# Patient Record
Sex: FEMALE | Race: WHITE | Employment: PART TIME | URBAN - METROPOLITAN AREA
[De-identification: names, ages, dates, MRNs, and addresses within clinical notes are randomized per-mention and may not be internally consistent; named-entity substitution may affect disease eponyms.]

---

## 2017-06-15 ENCOUNTER — OFFICE VISIT (OUTPATIENT)
Dept: FAMILY MEDICINE | Facility: CLINIC | Age: 52
End: 2017-06-15
Payer: MEDICARE

## 2017-06-15 ENCOUNTER — RESULT FOLLOW UP (OUTPATIENT)
Dept: FAMILY MEDICINE | Facility: CLINIC | Age: 52
End: 2017-06-15

## 2017-06-15 VITALS
BODY MASS INDEX: 24.19 KG/M2 | DIASTOLIC BLOOD PRESSURE: 72 MMHG | WEIGHT: 120 LBS | SYSTOLIC BLOOD PRESSURE: 112 MMHG | HEIGHT: 59 IN | HEART RATE: 72 BPM | TEMPERATURE: 98.1 F

## 2017-06-15 DIAGNOSIS — E04.2 NONTOXIC MULTINODULAR GOITER: ICD-10-CM

## 2017-06-15 DIAGNOSIS — Z12.4 SCREENING FOR MALIGNANT NEOPLASM OF CERVIX: ICD-10-CM

## 2017-06-15 DIAGNOSIS — Q05.9 SPINA BIFIDA WITHOUT HYDROCEPHALUS, UNSPECIFIED SPINAL REGION (H): ICD-10-CM

## 2017-06-15 DIAGNOSIS — N81.11 CYSTOCELE, MIDLINE: ICD-10-CM

## 2017-06-15 DIAGNOSIS — Z43.6 ATTENTION TO UROSTOMY (H): ICD-10-CM

## 2017-06-15 DIAGNOSIS — Z11.59 NEED FOR HEPATITIS C SCREENING TEST: ICD-10-CM

## 2017-06-15 DIAGNOSIS — G40.909 SEIZURE DISORDER (H): ICD-10-CM

## 2017-06-15 DIAGNOSIS — M70.61 TROCHANTERIC BURSITIS OF RIGHT HIP: ICD-10-CM

## 2017-06-15 DIAGNOSIS — Z00.01 ENCOUNTER FOR ROUTINE ADULT HEALTH EXAMINATION WITH ABNORMAL FINDINGS: Primary | ICD-10-CM

## 2017-06-15 DIAGNOSIS — Z89.619 HISTORY OF LEG AMPUTATION (H): ICD-10-CM

## 2017-06-15 LAB
ANION GAP SERPL CALCULATED.3IONS-SCNC: 7 MMOL/L (ref 3–14)
BUN SERPL-MCNC: 15 MG/DL (ref 7–30)
CALCIUM SERPL-MCNC: 8.6 MG/DL (ref 8.5–10.1)
CHLORIDE SERPL-SCNC: 107 MMOL/L (ref 94–109)
CO2 SERPL-SCNC: 26 MMOL/L (ref 20–32)
CREAT SERPL-MCNC: 0.66 MG/DL (ref 0.52–1.04)
GFR SERPL CREATININE-BSD FRML MDRD: NORMAL ML/MIN/1.7M2
GLUCOSE SERPL-MCNC: 84 MG/DL (ref 70–99)
POTASSIUM SERPL-SCNC: 4.1 MMOL/L (ref 3.4–5.3)
SODIUM SERPL-SCNC: 140 MMOL/L (ref 133–144)
TSH SERPL DL<=0.005 MIU/L-ACNC: 1.19 MU/L (ref 0.4–4)

## 2017-06-15 PROCEDURE — 84443 ASSAY THYROID STIM HORMONE: CPT | Performed by: FAMILY MEDICINE

## 2017-06-15 PROCEDURE — G0472 HEP C SCREEN HIGH RISK/OTHER: HCPCS | Performed by: FAMILY MEDICINE

## 2017-06-15 PROCEDURE — 80048 BASIC METABOLIC PNL TOTAL CA: CPT | Performed by: FAMILY MEDICINE

## 2017-06-15 PROCEDURE — 99213 OFFICE O/P EST LOW 20 MIN: CPT | Mod: 25 | Performed by: FAMILY MEDICINE

## 2017-06-15 PROCEDURE — G0476 HPV COMBO ASSAY CA SCREEN: HCPCS | Performed by: FAMILY MEDICINE

## 2017-06-15 PROCEDURE — G0145 SCR C/V CYTO,THINLAYER,RESCR: HCPCS | Performed by: FAMILY MEDICINE

## 2017-06-15 PROCEDURE — 36415 COLL VENOUS BLD VENIPUNCTURE: CPT | Performed by: FAMILY MEDICINE

## 2017-06-15 PROCEDURE — 86803 HEPATITIS C AB TEST: CPT | Performed by: FAMILY MEDICINE

## 2017-06-15 PROCEDURE — 99396 PREV VISIT EST AGE 40-64: CPT | Performed by: FAMILY MEDICINE

## 2017-06-15 NOTE — NURSING NOTE
"Chief Complaint   Patient presents with     Medicare Visit     Physical       Initial /72 (BP Location: Right arm, Cuff Size: Adult Regular)  Pulse 72  Temp 98.1  F (36.7  C) (Oral)  Ht 4' 11\" (1.499 m)  Wt 120 lb (54.4 kg)  LMP 06/05/2017  BMI 24.24 kg/m2 Estimated body mass index is 24.24 kg/(m^2) as calculated from the following:    Height as of this encounter: 4' 11\" (1.499 m).    Weight as of this encounter: 120 lb (54.4 kg).  Medication Reconciliation: complete     Dana Bedolla CMA (AAMA)      "

## 2017-06-15 NOTE — PROGRESS NOTES
SUBJECTIVE:     CC: David Grissom is an 51 year old woman who presents for preventive health visit.     Healthy Habits:    Do you get at least three servings of calcium containing foods daily (dairy, green leafy vegetables, etc.)? yes    Amount of exercise or daily activities, outside of work: 4-5 day(s) per week    Problems taking medications regularly No    Medication side effects: No    Have you had an eye exam in the past two years? yes    Do you see a dentist twice per year? yes    Do you have sleep apnea, excessive snoring or daytime drowsiness?yes    Spina Bifida + Seizure Disorder  Patient has a hx of spina bifida and a seizure disorder. She is not on any meds for seizures at this time. She has not seen a neurologist in quite some time either. She hasn't had a seizure in 6-7 years. She reports that the seizures were a allergic reaction from taking cipro for UTI. She does not drive.    Urostomy Tube  She has a urostomy tube because of her spina bifida. She last saw urology in 2014 and at that time they ordered a CAT scan of her kidneys and they discussed continent neobladder. It does not look like this was followed up on and she has not seen urology since.    Prolapse  Patient reports ongoing prolapse and that it has been feeling a bit more uncomfortable and that it is coming out more. It's not painful, but it is bothersome. She would be more interested in pessary than a surgery but is willing to talk to OB about these options.     Hip Pain  She has a job at [employer] and is standing a lot. She has been experiencing sore hips as a result. She has started taking magnesium at night as she experiences pain at night. She was also taking ibuprofen before for the pain and that helps. She does have some swelling in her legs when she stands for too long. X-ray from April 2015 of right hip showed chronic deformity of the femoral head, degenerative changes, and severe old healed trauma due to surgeries.      Thyroid  nodule   In 2013, US showed 3 cm complex cystic nodule and she was told to have it checked with an aspiration. Biopsy was done - results benign and she was told to repeat US in a year or go back to endocrinology if it enlarges.      Additional Comments  She has a hx of leg amputation and wears a prosthetic    Today's PHQ-2 Score:   PHQ-2 ( 1999 [employer]) 6/15/2017 4/15/2016   Q1: Little interest or pleasure in doing things 0 -   Q2: Feeling down, depressed or hopeless 0 -   PHQ-2 Score 0 -   Q1: Little interest or pleasure in doing things - Not at all   Q2: Feeling down, depressed or hopeless - Not at all   PHQ-2 Score - 0     Abuse: Current or Past(Physical, Sexual or Emotional)- No  Do you feel safe in your environment - Yes    Social History   Substance Use Topics     Smoking status: Former Smoker     Types: Cigarettes     Quit date: 1/2/1987     Smokeless tobacco: Never Used     Alcohol use 0.0 - 0.6 oz/week     0 - 1 Standard drinks or equivalent per week      Comment: occasional     The patient does not drink >3 drinks per day nor >7 drinks per week.    Recent Labs   Lab Test  04/18/16   0917  04/16/15   0901  04/01/13   0928   CHOL  177  159  172   HDL  74  69  62   LDL  88  78  98   TRIG  76  58  58   CHOLHDLRATIO   --   2.3  2.8   NHDL  103   --    --      Reviewed orders with patient.  Reviewed health maintenance and updated orders accordingly - Yes    Mammo Decision Support:  Patient over age 50, mutual decision to screen reflected in health maintenance.    Pertinent mammograms are reviewed under the imaging tab.  History of abnormal Pap smear: NO - age 30-65 PAP every 5 years with negative HPV co-testing recommended    Reviewed and updated as needed this visit by clinical staff  Tobacco  Allergies  Meds  Med Hx  Surg Hx  Fam Hx  Soc Hx        Reviewed and updated as needed this visit by Provider          ROS:  C: NEGATIVE for fever, chills, change in weight  I: NEGATIVE for worrisome rashes, moles or  "lesions  E: NEGATIVE for vision changes or irritation  ENT: NEGATIVE for ear, mouth and throat problems  R: NEGATIVE for significant cough or SOB  B: NEGATIVE for masses, tenderness or discharge  CV: NEGATIVE for chest pain, palpitations or peripheral edema  GI: NEGATIVE for nausea, abdominal pain, heartburn, or change in bowel habits  : NEGATIVE for unusual urinary or vaginal symptoms. Periods are regular.  M: See above  N: NEGATIVE for weakness, dizziness or paresthesias  P: NEGATIVE for changes in mood or affect    This document serves as a record of the services and decisions personally performed and made by Naima Saul DO. It was created on her/his behalf by Sarah Hinson, a trained medical scribe. The creation of this document is based on the provider's statements to the medical scribe.  Sarah Hinson Aleah 15, 2017 10:07 AM    Problem list, Medication list, Allergies, and Medical/Social/Surgical histories reviewed in EPIC and updated as appropriate.  BP Readings from Last 3 Encounters:   06/15/17 112/72   11/29/16 110/68   04/18/16 116/64    Wt Readings from Last 3 Encounters:   06/15/17 54.4 kg (120 lb)   11/29/16 53.6 kg (118 lb 4 oz)   04/18/16 56.7 kg (125 lb)         OBJECTIVE:     /72 (BP Location: Right arm, Cuff Size: Adult Regular)  Pulse 72  Temp 98.1  F (36.7  C) (Oral)  Ht 1.499 m (4' 11\")  Wt 54.4 kg (120 lb)  LMP 06/05/2017  BMI 24.24 kg/m2  EXAM:  GENERAL: healthy, alert and no distress  EYES: Eyes grossly normal to inspection, PERRL and conjunctivae and sclerae normal  HENT: ear canals and TM's normal, nose and mouth without ulcers or lesions  NECK: no adenopathy, no asymmetry, masses, or scars and thyroid normal to palpation  RESP: lungs clear to auscultation - no rales, rhonchi or wheezes  BREAST: normal bilateral fibrocystic changes, normal without masses, tenderness or nipple discharge and no palpable axillary masses or adenopathy  CV: regular rate and rhythm, normal S1 S2, " no S3 or S4, no murmur, click or rub, no peripheral edema and peripheral pulses strong  ABDOMEN: soft, nontender, no hepatosplenomegaly, no masses and bowel sounds normal   (female): cervix over to the left, normal female external genitalia, normal urethral meatus, vaginal mucosa pink, moist, well rugated, and normal cervix/adnexa/uterus without masses or discharge  MS: painless and FROM of left hip, reduced internal rotation of the right hip, mild tenderness to the left trochanteric bursa, tenderness over the right bursa, no gross musculoskeletal defects noted, no edema  SKIN: large scar on lumbar spine, no suspicious lesions or rashes  PSYCH: mentation appears normal, affect normal/bright    ASSESSMENT/PLAN:     (Z00.01) Encounter for routine adult health examination with abnormal findings  (primary encounter diagnosis)  Comment:   Plan:     (Q05.9) Spina bifida without hydrocephalus, unspecified spinal region (H)  Comment:   Plan:     (G40.909) Seizure disorder (H)  Comment:   Plan: not active in 7 years     (E04.2) Nontoxic multinodular goiter  Comment:   Plan: US Thyroid, TSH with free T4 reflex            (Z89.619) History of leg amputation (H)  Comment:   Plan: [facility] PT, HAND, AND CHIROPRACTIC REFERRAL            (Z43.6) Attention to urostomy (H)  Comment:   Plan: UROLOGY ADULT REFERRAL, Basic metabolic panel          (Ca, Cl, CO2, Creat, Gluc, K, Na, BUN)            (Z12.4) Screening for malignant neoplasm of cervix  Comment:   Plan: Pap imaged thin layer screen with HPV -         recommended age 30 - 65 years (select HPV order        below), HPV High Risk Types DNA Cervical            (Z11.59) Need for hepatitis C screening test  Comment:   Plan: Hepatitis C Screen Reflex to HCV RNA Quant and         Genotype            (N81.11) Cystocele, midline  Comment:   Plan: OB/GYN REFERRAL            (M70.61) Trochanteric bursitis of right hip  Comment:   Plan: [facility] HAND, AND CHIROPRACTIC REFERRAL      I referred  "patient to urology to have urostomy tube evaluated and referred her to OBGYN for a pessary consult. I also referred her to physical therapy to treat hip pain caused by trochanertic bursisits.  She can return for an injection if the PT doesn't help it to resolve.   . The current medical regimen is effective;  continue present plan and medications. Patient will follow up as needed.     COUNSELING:   Reviewed preventive health counseling, as reflected in patient instructions       Regular exercise       Healthy diet/nutrition     reports that she quit smoking about 30 years ago. Her smoking use included Cigarettes. She has never used smokeless tobacco.    Estimated body mass index is 24.24 kg/(m^2) as calculated from the following:    Height as of this encounter: 1.499 m (4' 11\").    Weight as of this encounter: 54.4 kg (120 lb).     Counseling Resources:  ATP IV Guidelines  Pooled Cohorts Equation Calculator  Breast Cancer Risk Calculator  FRAX Risk Assessment  ICSI Preventive Guidelines  Dietary Guidelines for Americans, 2010  USDA's MyPlate  ASA Prophylaxis  Lung CA Screening    Naima Saul DO  [facility]    This document serves as a record of the services and decisions personally performed and made by Naima Saul DO. It was created on her behalf by Sarah Hinson, a trained medical scribe. The creation of this document is based on the provider's statements to the medical scribe.  Sarah Hinson Aleah 15, 2017 10:07 AM      "

## 2017-06-15 NOTE — MR AVS SNAPSHOT
After Visit Summary   6/15/2017    David Grissom    MRN: [id number]           Patient Information     Date Of Birth          1965        Visit Information        Provider Department      6/15/2017 9:40 AM Naima Saul DO [facility]        Today's Diagnoses     Encounter for routine adult health examination with abnormal findings    -  1    Spina bifida without hydrocephalus, unspecified spinal region (H)        Seizure disorder (H)        Nontoxic multinodular goiter        History of leg amputation (H)        Attention to urostomy (H)        Screening for malignant neoplasm of cervix        Need for hepatitis C screening test        Cystocele, midline        Trochanteric bursitis of right hip          Care Instructions      Preventive Health Recommendations  Female Ages 50 - 64    Yearly exam: See your health care provider every year in order to  o Review health changes.   o Discuss preventive care.    o Review your medicines if your doctor has prescribed any.      Get a Pap test every three years (unless you have an abnormal result and your provider advises testing more often).    If you get Pap tests with HPV test, you only need to test every 5 years, unless you have an abnormal result.     You do not need a Pap test if your uterus was removed (hysterectomy) and you have not had cancer.    You should be tested each year for STDs (sexually transmitted diseases) if you're at risk.     Have a mammogram every 1 to 2 years.    Have a colonoscopy at age 50, or have a yearly FIT test (stool test). These exams screen for colon cancer.      Have a cholesterol test every 5 years, or more often if advised.    Have a diabetes test (fasting glucose) every three years. If you are at risk for diabetes, you should have this test more often.     If you are at risk for osteoporosis (brittle bone disease), think about having a bone density scan (DEXA).    Shots: Get a flu shot each year.  Get a tetanus shot every 10 years.    Nutrition:     Eat at least 5 servings of fruits and vegetables each day.    Eat whole-grain bread, whole-wheat pasta and brown rice instead of white grains and rice.    Talk to your provider about Calcium and Vitamin D.     Lifestyle    Exercise at least 150 minutes a week (30 minutes a day, 5 days a week). This will help you control your weight and prevent disease.    Limit alcohol to one drink per day.    No smoking.     Wear sunscreen to prevent skin cancer.     See your dentist every six months for an exam and cleaning.    See your eye doctor every 1 to 2 years.    Go see OBGYN - Dr. Kimbrough (let them know that this is a pessary consult).    Do physical therapy for bursitis.    Go see Urology.    Get Thyroid Ultra Sound.          Follow-ups after your visit        Additional Services     [facility], HAND, AND CHIROPRACTIC REFERRAL       **This order will print in the [facility] Scheduling Office**    Physical Therapy, Hand Therapy and Chiropractic Care are available through:    *[facility]  *[facility]  *[facility]    Call one number to schedule at any of the above locations: [phone].    Your provider has referred you to: Physical Therapy at [facility] or [facility]    Indication/Reason for Referral: Hip Pain  Onset of Illness: months   Therapy Orders: Evaluate and Treat  Special Programs: None  Special Request: None    Salena Martion      Additional Comments for the Therapist or Chiropractor:  Baljeet     Please be aware that coverage of these services is subject to the terms and limitations of your health insurance plan.  Call member services at your health plan with any benefit or coverage questions.      Please bring the following to your appointment:    *Your personal calendar for scheduling future appointments  *Comfortable clothing            OB/GYN REFERRAL       Your provider has referred you to:  [facility]  [facility] [phone]   [email or web address]    Please be aware that coverage of these services is subject to the terms and limitations of your health insurance plan.  Call member services at your health plan with any benefit or coverage questions.      Please bring the following with you to your appointment:    (1) Any X-Rays, CTs or MRIs which have been performed.  Contact the facility where they were done to arrange for  prior to your scheduled appointment.   (2) List of current medications   (3) This referral request   (4) Any documents/labs given to you for this referral            UROLOGY ADULT REFERRAL       Your provider has referred you to: [facility]: [facility] [phone]   [email or web address]    Please be aware that coverage of these services is subject to the terms and limitations of your health insurance plan.  Call member services at your health plan with any benefit or coverage questions.      Please bring the following with you to your appointment:    (1) Any X-Rays, CTs or MRIs which have been performed.  Contact the facility where they were done to arrange for  prior to your scheduled appointment.    (2) List of current medications  (3) This referral request   (4) Any documents/labs given to you for this referral                  Future tests that were ordered for you today     Open Future Orders        Priority Expected Expires Ordered    US Thyroid Routine  6/15/2018 6/15/2017            Who to contact     If you have questions or need follow up information about today's clinic visit or your schedule please contact [facility] directly at [phone].  Normal or non-critical lab and imaging results will be communicated to you by MyChart, letter or phone within 4 business days after the clinic has received the results. If you do not hear from us within 7 days, please contact the clinic  "through [employer] or phone. If you have a critical or abnormal lab result, we will notify you by phone as soon as possible.  Submit refill requests through [employer] or call your pharmacy and they will forward the refill request to us. Please allow 3 business days for your refill to be completed.          Additional Information About Your Visit        doggylootharTrendr Information     [employer] gives you secure access to your electronic health record. If you see a primary care provider, you can also send messages to your care team and make appointments. If you have questions, please call your primary care clinic.  If you do not have a primary care provider, please call [phone] and they will assist you.        Care EveryWhere ID     This is your Care EveryWhere ID. This could be used by other organizations to access your [facility] medical records  [id number]        Your Vitals Were     Pulse Temperature Height Last Period BMI (Body Mass Index)       72 98.1  F (36.7  C) (Oral) 4' 11\" (1.499 m) 06/05/2017 24.24 kg/m2        Blood Pressure from Last 3 Encounters:   06/15/17 112/72   11/29/16 110/68   04/18/16 116/64    Weight from Last 3 Encounters:   06/15/17 120 lb (54.4 kg)   11/29/16 118 lb 4 oz (53.6 kg)   04/18/16 125 lb (56.7 kg)              We Performed the Following     Basic metabolic panel  (Ca, Cl, CO2, Creat, Gluc, K, Na, BUN)     Hepatitis C Screen Reflex to HCV RNA Quant and Genotype     HPV High Risk Types DNA Cervical     [facility] PT, HAND, AND CHIROPRACTIC REFERRAL     OB/GYN REFERRAL     Pap imaged thin layer screen with HPV - recommended age 30 - 65 years (select HPV order below)     TSH with free T4 reflex     UROLOGY ADULT REFERRAL        Primary Care Provider Office Phone # Fax #    Naima Saul [phone]       [address] [address]        Thank you!     Thank you for choosing [facility]  for your care. Our goal is always " to provide you with excellent care. Hearing back from our patients is one way we can continue to improve our services. Please take a few minutes to complete the written survey that you may receive in the mail after your visit with us. Thank you!             Your Updated Medication List - Protect others around you: Learn how to safely use, store and throw away your medicines at www.disposemymeds.org.          This list is accurate as of: 6/15/17 10:44 AM.  Always use your most recent med list.                   Brand Name Dispense Instructions for use    ascorbic acid 250 MG tablet    VITAMIN C     Take 250 mg by mouth daily.       DAILY MULTIVITAMIN PO      Take  by mouth.       Fish Oil Oil      daily.       * order for DME     1 Device    Equipment being ordered: right prosthetic leg       * order for DME     1 Device    Equipment being ordered: crutches       * Notice:  This list has 2 medication(s) that are the same as other medications prescribed for you. Read the directions carefully, and ask your doctor or other care provider to review them with you.

## 2017-06-15 NOTE — PATIENT INSTRUCTIONS

## 2017-06-15 NOTE — LETTER
June 26, 2018      David Grissom  [address]   [place] [address]    Dear MsSaraiJaciel,      At [facility], your health and wellness is our primary concern. That is why we are following up on a colposcopy from 7/10/17. Your provider had recommended that you have a Pap smear and HPV test completed by 7/10/18. Our records do not show that this has been scheduled.    It is important to complete the follow up that your provider has suggested for you to ensure that there are no worsening changes which may, over time, develop into cancer.      Please contact our office at  [phone] to schedule an appointment for a Pap smear and HPV test at your earliest convenience. If you have questions or concerns, please call the clinic and we will be happy to assist you.    If you have completed the tests outside of [facility], please have the results forwarded to our office. We will update the chart for your primary Physician to review before your next annual physical.     Thank you for choosing [facility]!    Sincerely,      Naima Saul DO/jojo

## 2017-06-16 LAB — HCV AB SERPL QL IA: NORMAL

## 2017-06-20 LAB
COPATH REPORT: ABNORMAL
PAP: ABNORMAL

## 2017-06-21 PROBLEM — R87.610 ASCUS WITH POSITIVE HIGH RISK HPV CERVICAL: Status: ACTIVE | Noted: 2017-06-17

## 2017-06-21 PROBLEM — R87.810 ASCUS WITH POSITIVE HIGH RISK HPV CERVICAL: Status: ACTIVE | Noted: 2017-06-17

## 2017-06-21 LAB
FINAL DIAGNOSIS: ABNORMAL
HPV HR 12 DNA CVX QL NAA+PROBE: POSITIVE
HPV16 DNA SPEC QL NAA+PROBE: POSITIVE
HPV18 DNA SPEC QL NAA+PROBE: NEGATIVE
SPECIMEN DESCRIPTION: ABNORMAL

## 2017-06-21 NOTE — PROGRESS NOTES
3/27/12: ASCUS Pap, Neg HPV  4/1/13: NIL Pap  6/10/14: NIL Pap, Neg HPV  6/15/17: [facility]US Pap, + HR HPV 16 & other HR HPV. Plan colp  6/22/17 Message left to return call.   6/22/17 Pt notified. [facility].  7/10/17 [place] [facility] neg. Plan: cotest in 1 year.  6/26/18 [facility] reminder letter sent (rlm)  9/27/18 NIL pap, Neg HR HPV. Plan cotest in 3 years.  (MRA)

## 2017-06-26 ENCOUNTER — TELEPHONE (OUTPATIENT)
Dept: FAMILY MEDICINE | Facility: CLINIC | Age: 52
End: 2017-06-26

## 2017-06-26 NOTE — TELEPHONE ENCOUNTER
Reason for visit: colposcopy     Have you been treated for this in the past? No    Additional comments: patient needs to schedule a colposcopy     Can we leave a detailed message on this number? YES    Phone number patient can be reached at: Home number on file [phone] (home)    Best Time: anytime     Call taken on 6/26/2017 at 8:12 AM by Marbella Duran

## 2017-07-03 ENCOUNTER — THERAPY VISIT (OUTPATIENT)
Dept: PHYSICAL THERAPY | Facility: CLINIC | Age: 52
End: 2017-07-03
Payer: MEDICARE

## 2017-07-03 DIAGNOSIS — M54.50 CHRONIC BILATERAL LOW BACK PAIN WITHOUT SCIATICA: Primary | ICD-10-CM

## 2017-07-03 DIAGNOSIS — G89.29 CHRONIC BILATERAL LOW BACK PAIN WITHOUT SCIATICA: Primary | ICD-10-CM

## 2017-07-03 PROCEDURE — G8981 BODY POS CURRENT STATUS: HCPCS | Mod: GP | Performed by: PHYSICAL THERAPIST

## 2017-07-03 PROCEDURE — 97110 THERAPEUTIC EXERCISES: CPT | Mod: GP | Performed by: PHYSICAL THERAPIST

## 2017-07-03 PROCEDURE — 97161 PT EVAL LOW COMPLEX 20 MIN: CPT | Mod: GP | Performed by: PHYSICAL THERAPIST

## 2017-07-03 PROCEDURE — G8982 BODY POS GOAL STATUS: HCPCS | Mod: GP | Performed by: PHYSICAL THERAPIST

## 2017-07-03 PROCEDURE — 97530 THERAPEUTIC ACTIVITIES: CPT | Mod: GP | Performed by: PHYSICAL THERAPIST

## 2017-07-03 ASSESSMENT — ACTIVITIES OF DAILY LIVING (ADL)
WALKING_DOWN_STEEP_HILLS: MODERATE DIFFICULTY
DEEP_SQUATTING: EXTREME DIFFICULTY
HOS_ADL_COUNT: 17
HEAVY_WORK: EXTREME DIFFICULTY
GETTING_INTO_AND_OUT_OF_AN_AVERAGE_CAR: NO DIFFICULTY AT ALL
WALKING_APPROXIMATELY_10_MINUTES: SLIGHT DIFFICULTY
SITTING_FOR_15_MINUTES: NO DIFFICULTY AT ALL
STEPPING_UP_AND_DOWN_CURBS: NO DIFFICULTY AT ALL
WALKING_INITIALLY: NO DIFFICULTY AT ALL
STANDING_FOR_15_MINUTES: NO DIFFICULTY AT ALL
RECREATIONAL_ACTIVITIES: SLIGHT DIFFICULTY
GOING_DOWN_1_FLIGHT_OF_STAIRS: SLIGHT DIFFICULTY
PUTTING_ON_SOCKS_AND_SHOES: NO DIFFICULTY AT ALL
LIGHT_TO_MODERATE_WORK: SLIGHT DIFFICULTY
HOS_ADL_ITEM_SCORE_TOTAL: 46
TWISTING/PIVOTING_ON_INVOLVED_LEG: MODERATE DIFFICULTY
HOW_WOULD_YOU_RATE_YOUR_CURRENT_LEVEL_OF_FUNCTION_DURING_YOUR_USUAL_ACTIVITIES_OF_DAILY_LIVING_FROM_0_TO_100_WITH_100_BEING_YOUR_LEVEL_OF_FUNCTION_PRIOR_TO_YOUR_HIP_PROBLEM_AND_0_BEING_THE_INABILITY_TO_PERFORM_ANY_OF_YOUR_USUAL_DAILY_ACTIVITIES?: 70
HOS_ADL_SCORE(%): 67.65
WALKING_15_MINUTES_OR_GREATER: MODERATE DIFFICULTY
ROLLING_OVER_IN_BED: MODERATE DIFFICULTY
HOS_ADL_HIGHEST_POTENTIAL_SCORE: 68
GETTING_INTO_AND_OUT_OF_A_BATHTUB: SLIGHT DIFFICULTY
GOING_UP_1_FLIGHT_OF_STAIRS: SLIGHT DIFFICULTY
WALKING_UP_STEEP_HILLS: MODERATE DIFFICULTY

## 2017-07-03 NOTE — LETTER
DEPARTMENT OF HEALTH AND HUMAN SERVICES  CENTERS FOR MEDICARE & MEDICAID SERVICES    PLAN/UPDATED PLAN OF PROGRESS FOR OUTPATIENT REHABILITATION    PATIENTS NAME:  David Grissom   : 1965    PROVIDER NUMBER:    [id number]  [facility]:  [phone]     PROVIDER NAME: [facility]  MEDICAL RECORD NUMBER: [id number]     START OF CARE DATE:  SOC Date: 17   TYPE:  PT    PRIMARY/TREATMENT DIAGNOSIS: (Pertinent Medical Diagnosis)  Chronic bilateral low back pain without sciatica    VISITS FROM START OF CARE:    1     [facility] Initial Evaluation -- Lumbar  Date: July 3, 2017  David Grissom is a 51 year old female with a R hip condition.   Referral: GP  Work mechanical stresses:  Nay   Employment status:  Up to FT  Leisure mechanical stresses:   Functional disability score (LESLY/STarT Back):  none  VAS score (0-10): 4  Patient goals/expectations:  Cont to work and manage pain sleep on R side    HISTORY:  Present symptoms: R lat hip, B LS pain  Pain quality (sharp/shooting/stabbing/aching/burning/cramping):  stinging   Paresthesia (yes/no):  no  Present since (onset date): 2017  MD order 6/15/2017   Symptoms (improving/unchanging/worsening):  unchanged  Symptoms commenced as a result of: standing at new job  Condition occurred in the following environment:   work   Symptoms at onset (back/thigh/leg): LS  Constant symptoms (back/thigh/leg): LS and hip  Intermittent symptoms (back/thigh/leg):   Symptoms are made worse with the following: Sometimes Sitting, Always Standing, Always Lying, Time of day -PM always  Symptoms are made better with the following: Always Walking, Time of day - Always AM and Always On the move  Disturbed sleep (yes/no):  Yes, positional   Sleeping postures (prone/sup/side R/L): SL R  Previous episodes (0/1-5/6-10/11+): chronic LBP due to spina bifida   Year of first episode:   Previous history:   Previous  treatments: none    PATIENTS NAME:  David Grissom   : 1965  PRIMARY/TREATMENT DIAGNOSIS: (Pertinent Medical Diagnosis)  Chronic bilateral low back pain without sciatica    Specific Questions:  Cough/Sneeze/Strain (pos/neg): no  Bowel/Bladder (normal/abnormal): normal  Gait (normal/abnormal): normal  Medications (nil/NSAIDS/analg/steroids/anticoag/other):  NSAIDS and OTC analgesic  Medical allergies:   latex  General health (excellent/good/fair/poor):  good  Pertinent medical history:  Seizures and spina bifida,  R BKA  Imaging (NA/Xray/MRI):   hip DJD  Recent or major surgery (yes/no):  no  Night pain (yes/no): yes  Accidents (yes/no): no  Unexplained weight loss (yes/no): no  Barriers at home: none  Other red flags: none    EXAMINATION    Posture:   Sitting (good/fair/poor): poor  Standing (good/fair/poor):poor, uses forearm crutches  Lordosis (red/acc/normal): dec  Correction of posture (better/worse/no effect): better  Lateral Shift (right/left/nil): no  Relevant (yes/no):    Other Observations:     Neurological:  Motor deficit:    Reflexes:    Sensory deficit:    Dural signs:    Movement Loss:   Estevan Mod Min Nil Pain   Flexion     Hands flat to floor, dec   Extension + upper body veers right    dec   Side Gliding R     unable   Side Gliding L    +    Test Movements:   During: produces, abolishes, increases, decreases, no effect, centralizing, peripheralizing   After: better, worse, no better, no worse, no effect, centralized, peripheralized          PATIENTS NAME:  David Grissom   : 1965  PRIMARY/TREATMENT DIAGNOSIS: (Pertinent Medical Diagnosis)  Chronic bilateral low back pain without sciatica    Pretest symptoms standing: R LS lat hip   Symptoms During Symptoms After ROM increased ROM decreased No Effect   FIS Decreases    No Better         Rep FIS Decreases    No Better      + hands to floor to begin with   EIS Unable to perform easily so did EIL           Rep EIS           Pretest symptoms lying: R lat 4/10hip stinging   Symptoms During Symptoms After ROM increased ROM decreased No Effect   GARRISON SIM 1 min then 2 min  dec  NB       Rep GARRISON  na        EIL Decreases R hip   P. R LS on way down No Better hip pain         Rep EIL Decreases R hip and dec R LS with reps    Better hip pain in standing, R LS same     +     If required, pretest symptoms:    Symptoms During Symptoms After ROM increased ROM decreased No Effect   SGIS - R          Rep SGIS - R          SGIS - L          Rep SGIS - L            Static Tests:  Sitting slouched:    Sitting erect:    Standing slouched   Standing erect:    Lying prone in extension:   Long sitting:    Other Test: standing with 75% wt bearing R LE   Provisional Classification:  Derangement - Bilateral, symmetrical, symptoms above knee  Principle of Management:  Education:  Etiology hip vs LS centralization proper sitting     Equipment provided:  Towel roll  Mechanical therapy (Y/N):  Y   Extension principle:  EIL 2 x 10 reps q 2-3 hrs at home EIS 10 x at work q 1 hr  Lateral Principle:    Flexion principle:      Other:  Attempt to equalize wt and stand with feet apart at work      PATIENTS NAME:  David Grissom   : 1965  PRIMARY/TREATMENT DIAGNOSIS: (Pertinent Medical Diagnosis)  Chronic bilateral low back pain without sciatica    ASSESSMENT/PLAN:  Patient is a 51 year old female with lumbar complaints.    Patient has the following significant findings with corresponding treatment plan.                Diagnosis 1:  LBP referred to hip  Pain -  self management, education, directional preference exercise and home program  Decreased ROM/flexibility - therapeutic exercise, therapeutic activity and home program  Decreased joint mobility - therapeutic exercise, therapeutic activity and home program  Decreased function - therapeutic activities and home program  Impaired posture - neuro re-education, therapeutic activities and home  program    Therapy Evaluation Codes:   1) History comprised of:   Personal factors that impact the plan of care:      Profession.    Comorbidity factors that impact the plan of care are:      Pain at night/rest, Seizures and spina bifida, R BKA.     Medications impacting care: Anti-inflammatory and Pain.  2) Examination of Body Systems comprised of:   Body structures and functions that impact the plan of care:      Lumbar spine.   Activity limitations that impact the plan of care are:      Standing and Working. Sleeping.  3) Clinical presentation characteristics are:   Stable/Uncomplicated.  4) Decision-Making    Low complexity using standardized patient assessment instrument and/or measureable assessment of functional outcome.  Cumulative Therapy Evaluation is: Low complexity.    Previous and current functional limitations:  (See Goal Flow Sheet for this information)    Short term and Long term goals: (See Goal Flow Sheet for this information)   Communication ability:  Patient appears to be able to clearly communicate and understand verbal and written communication and follow directions correctly.  Treatment Explanation - The following has been discussed with the patient:   RX ordered/plan of care  Anticipated outcomes  Possible risks and side effects                        PATIENTS NAME:  David Grissom   : 1965  PRIMARY/TREATMENT DIAGNOSIS: (Pertinent Medical Diagnosis)  Chronic bilateral low back pain without sciatica    This patient would benefit from PT intervention to resume normal activities.   Rehab potential is good.  Frequency:  1 X week, once daily  Duration:  for 6 weeks  Discharge Plan:  Achieve all LTG.  Independent in home treatment program.  Reach maximal therapeutic benefit.          Caregiver Signature/Credentials _____________________________ Date ________         Gabrielle Bennett, PT, cert MDT     I have reviewed and certified the need for these services and plan of treatment while  "under my care.        PHYSICIAN'S SIGNATURE:   _________________________________________    Date___________   Naima Saul    Certification period:  Beginning of Cert date period: 07/03/17 to 09/30/17     Functional Level Progress Report: Please see attached \"Goal Flow sheet for Functional level.\"    ____X____ Continue Services or       ________ DC Services                Service dates: From  SOC Date: 07/03/17  to present                         "

## 2017-07-03 NOTE — PROGRESS NOTES
[facility] Initial Evaluation -- Lumbar    Date: July 3, 2017  David Grissom is a 51 year old female with a R hip condition.   Referral: GP  Work mechanical stresses:  Nay garcia  Employment status:  Up to FT  Leisure mechanical stresses:   Functional disability score (LESLY/STarT Back):  none  VAS score (0-10): 4  Patient goals/expectations:  Cont to work and manage pain sleep on R side    HISTORY:    Present symptoms: R lat hip, B LS pain  Pain quality (sharp/shooting/stabbing/aching/burning/cramping):  stinging   Paresthesia (yes/no):  no    Present since (onset date): 11/2017  MD order 6/15/2017 Symptoms (improving/unchanging/worsening):  unchanged  Symptoms commenced as a result of: standing at new job  Condition occurred in the following environment:   work     Symptoms at onset (back/thigh/leg): LS  Constant symptoms (back/thigh/leg): LS and hip  Intermittent symptoms (back/thigh/leg):     Symptoms are made worse with the following: Sometimes Sitting, Always Standing, Always Lying, Time of day -PM always  Symptoms are made better with the following: Always Walking, Time of day - Always AM and Always On the move    Disturbed sleep (yes/no):  Yes, positional Sleeping postures (prone/sup/side R/L): SL R    Previous episodes (0/1-5/6-10/11+): chronic LBP due to spina bifida Year of first episode:     Previous history:   Previous treatments: none      Specific Questions:  Cough/Sneeze/Strain (pos/neg): no  Bowel/Bladder (normal/abnormal): normal  Gait (normal/abnormal): normal  Medications (nil/NSAIDS/analg/steroids/anticoag/other):  NSAIDS and OTC analgesic  Medical allergies:   latex  General health (excellent/good/fair/poor):  good  Pertinent medical history:  Seizures and spina bifida,  R BKA  Imaging (NA/Xray/MRI):  2015 hip DJD  Recent or major surgery (yes/no):  no  Night pain (yes/no): yes  Accidents (yes/no): no  Unexplained weight loss (yes/no): no  Barriers at home: none  Other  red flags: none    EXAMINATION    Posture:   Sitting (good/fair/poor): poor  Standing (good/fair/poor):poor, uses forearm crutches  Lordosis (red/acc/normal): dec  Correction of posture (better/worse/no effect): better    Lateral Shift (right/left/nil): no  Relevant (yes/no):    Other Observations:     Neurological:    Motor deficit:    Reflexes:    Sensory deficit:    Dural signs:      Movement Loss:   Estevan Mod Min Nil Pain   Flexion     Hands flat to floor, dec   Extension + upper body veers right    dec   Side Gliding R     unable   Side Gliding L    +      Test Movements:   During: produces, abolishes, increases, decreases, no effect, centralizing, peripheralizing   After: better, worse, no better, no worse, no effect, centralized, peripheralized    Pretest symptoms standing: R LS lat hip   Symptoms During Symptoms After ROM increased ROM decreased No Effect   FIS Decreases    No Better         Rep FIS Decreases    No Better      + hands to floor to begin with   EIS Unable to perform easily so did EIL           Rep EIS          Pretest symptoms lying: R lat 4/10hip stinging   Symptoms During Symptoms After ROM increased ROM decreased No Effect   GARRISON SIM 1 min then 2 min  dec  NB       Rep GARRISON  na        EIL Decreases R hip   P. R LS on way down No Better hip pain         Rep EIL Decreases R hip and dec R LS with reps    Better hip pain in standing, R LS same     +     If required, pretest symptoms:    Symptoms During Symptoms After ROM increased ROM decreased No Effect   SGIS - R          Rep SGIS - R          SGIS - L          Rep SGIS - L            Static Tests:  Sitting slouched:    Sitting erect:    Standing slouched   Standing erect:    Lying prone in extension:   Long sitting:      Other Test: standing with 75% wt bearing R LE   Provisional Classification:  Derangement - Bilateral, symmetrical, symptoms above knee    Principle of Management:  Education:  Etiology hip vs LS centralization proper  sitting   Equipment provided:  Towel roll  Mechanical therapy (Y/N):  Y   Extension principle:  EIL 2 x 10 reps q 2-3 hrs at home EIS 10 x at work q 1 hr  Lateral Principle:    Flexion principle:    Other:  Attempt to equalize wt and stand with feet apart at work    ASSESSMENT/PLAN:    Patient is a 51 year old female with lumbar complaints.    Patient has the following significant findings with corresponding treatment plan.                Diagnosis 1:  LBP referred to hip  Pain -  self management, education, directional preference exercise and home program  Decreased ROM/flexibility - therapeutic exercise, therapeutic activity and home program  Decreased joint mobility - therapeutic exercise, therapeutic activity and home program  Decreased function - therapeutic activities and home program  Impaired posture - neuro re-education, therapeutic activities and home program    Therapy Evaluation Codes:   1) History comprised of:   Personal factors that impact the plan of care:      Profession.    Comorbidity factors that impact the plan of care are:      Pain at night/rest, Seizures and spina bifida, R BKA.     Medications impacting care: Anti-inflammatory and Pain.  2) Examination of Body Systems comprised of:   Body structures and functions that impact the plan of care:      Lumbar spine.   Activity limitations that impact the plan of care are:      Standing and Working. Sleeping.  3) Clinical presentation characteristics are:   Stable/Uncomplicated.  4) Decision-Making    Low complexity using standardized patient assessment instrument and/or measureable assessment of functional outcome.  Cumulative Therapy Evaluation is: Low complexity.    Previous and current functional limitations:  (See Goal Flow Sheet for this information)    Short term and Long term goals: (See Goal Flow Sheet for this information)     Communication ability:  Patient appears to be able to clearly communicate and understand verbal and written  communication and follow directions correctly.  Treatment Explanation - The following has been discussed with the patient:   RX ordered/plan of care  Anticipated outcomes  Possible risks and side effects  This patient would benefit from PT intervention to resume normal activities.   Rehab potential is good.    Frequency:  1 X week, once daily  Duration:  for 6 weeks  Discharge Plan:  Achieve all LTG.  Independent in home treatment program.  Reach maximal therapeutic benefit.    Please refer to the daily flowsheet for treatment today, total treatment time and time spent performing 1:1 timed codes.       Subjective:    HPI                    Objective:    System    Physical Exam    General     ROS

## 2017-07-10 ENCOUNTER — OFFICE VISIT (OUTPATIENT)
Dept: FAMILY MEDICINE | Facility: CLINIC | Age: 52
End: 2017-07-10
Payer: MEDICARE

## 2017-07-10 VITALS
HEART RATE: 72 BPM | SYSTOLIC BLOOD PRESSURE: 106 MMHG | WEIGHT: 112.38 LBS | DIASTOLIC BLOOD PRESSURE: 62 MMHG | HEIGHT: 59 IN | TEMPERATURE: 98.4 F | BODY MASS INDEX: 22.65 KG/M2

## 2017-07-10 DIAGNOSIS — R87.810 ASCUS WITH POSITIVE HIGH RISK HPV CERVICAL: Primary | ICD-10-CM

## 2017-07-10 DIAGNOSIS — R87.610 ASCUS WITH POSITIVE HIGH RISK HPV CERVICAL: Primary | ICD-10-CM

## 2017-07-10 LAB — BETA HCG QUAL IFA URINE: NEGATIVE

## 2017-07-10 PROCEDURE — 57454 BX/CURETT OF CERVIX W/SCOPE: CPT | Performed by: FAMILY MEDICINE

## 2017-07-10 PROCEDURE — 88305 TISSUE EXAM BY PATHOLOGIST: CPT | Mod: 26 | Performed by: FAMILY MEDICINE

## 2017-07-10 PROCEDURE — 84703 CHORIONIC GONADOTROPIN ASSAY: CPT | Performed by: FAMILY MEDICINE

## 2017-07-10 PROCEDURE — 88305 TISSUE EXAM BY PATHOLOGIST: CPT | Performed by: FAMILY MEDICINE

## 2017-07-10 NOTE — NURSING NOTE
"Chief Complaint   Patient presents with     Colposcopy       Initial /62 (BP Location: Left arm, Cuff Size: Adult Regular)  Pulse 72  Temp 98.4  F (36.9  C) (Oral)  Ht 4' 11\" (1.499 m)  Wt 112 lb 6 oz (51 kg)  LMP 06/05/2017  BMI 22.7 kg/m2 Estimated body mass index is 22.7 kg/(m^2) as calculated from the following:    Height as of this encounter: 4' 11\" (1.499 m).    Weight as of this encounter: 112 lb 6 oz (51 kg).  Medication Reconciliation: amairani HILLS, Certified Medical Assistant (AAMA)July 10, 2017 9:50 AM      "

## 2017-07-10 NOTE — LETTER
[facility]  [address] [place] [id number]  [phone]      July 18, 2017      David Grissom  [address]   [facility] [address]              Dear David,    Your pathology is negative, repeat pap in one year      Sincerely,    Carlos Eduardo Fisher DO/mv

## 2017-07-10 NOTE — PROGRESS NOTES
David Grissom is a 51 year old female who presents for initial colposcopy, referred by Dr. Saul. Pap smear 1 months ago showed: ASCUS Pap, + HR HPV 16 & other HR HPV. The prior pap showed   3/27/12: ASCUS Pap, Neg HPV  4/1/13: NIL Pap  6/10/14: NIL Pap, Neg HPV.     Past Medical History:   Diagnosis Date     ASCUS with positive high risk HPV cervical 06/15/2017    type 16 & other HR HPV     Spina bifida (H)      Family History   Problem Relation Age of Onset     DIABETES Mother      Hypertension Mother      HEART DISEASE Maternal Grandmother      HEART DISEASE Maternal Grandfather        Previous history of abnormal paps?: Yes 2012  History of cryotherapy (freezing)?: : No  History of veneral diseases: : No  Do you desire testing for any of these diseases? : unsure..was wondering if there was a fee for having the test done  History of genital warts:  No  Visible warts now?:  No  Previously treated? If so, how?:  No     Patient's last menstrual period was 06/05/2017.    Type of contraception: None -- is she planning pregnancy? no  Age at first sexual intercourse: 27  Number of sexual partners (lifetime): 4  History   Smoking Status     Former Smoker     Types: Cigarettes     Quit date: 1/2/1987   Smokeless Tobacco     Never Used       History of sexual abuse:  No    Allergies as of 07/10/2017 - Ricco as Reviewed 06/15/2017   Allergen Reaction Noted     Ciprofloxacin Other (See Comments) 11/29/2016       PROCEDURE:  Before the procedure, it was ensured that the patient was educated regarding the nature of her findings to date, the implications of them, and what was to be done. She has been made aware of the role of HPV, the natural history of infection, ways to minimize her future risk, the effect of HPV on the cervix, and treatment options available should they be indicated. The   pathophysiology of the cervix, including a discussion of squamous vs. endometrial cells, and squamous metaplasia have all been  reviewed, using illustrations and sketches. The details of the colposcopic procedure were reviewed, as well as the risks of missed diagnoses, pain, infection and bleeding. All questions were answered before proceeding, and informed consent was therefore obtained.    Bimanual examination: was performed and was unremarkable.  Unenhanced examination of the cervix was normal without lesions.  Pap smear and endocervical sampling not obtained due to:    not due  Please refer to images section for details!  Pap repeated?:  No  SCJ seen?:  yes  Endocervical speculum needed?:  No  ECC done?:  Yes   Lugol's solution used?:  Yes   Satisfactory examination?:  yes    Vaginal vault: normal to cursory inspection   Urethra normal?:  yes  Labia normal?:  yes  Perineum normal?:  yes  Rectum normal?:  yes    FINDINGS:    Cervix: no visible lesions  Procedure: biopsies taken (not including ECC): 0.    Procedure summary: Patient tolerated procedure well     Assessment: Normal exam without visible pathology  Surinder index:      Plan: Specimens labelled and sent to pathology., Will base further treatment on pathology findings., post biopsy instructions given to patient and call to discuss Pathology results

## 2017-07-10 NOTE — MR AVS SNAPSHOT
After Visit Summary   7/10/2017    David Grissom    MRN: [id number]           Patient Information     Date Of Birth          1965        Visit Information        Provider Department      7/10/2017 9:45 AM Carlos Eduardo Fisher DO; NE PROC RM OB/COLP [facility]        Today's Diagnoses     ASCUS with positive high risk HPV cervical    -  1       Follow-ups after your visit        Your next 10 appointments already scheduled     Jul 13, 2017  4:00 PM CDT   [facility]ELY[facility] Extremity with Gabrielle Bennett PT   [facility]   Physical Ther ([address] )    [address]    [place] [address] [phone]              Who to contact     If you have questions or need follow up information about today's clinic visit or your schedule please contact [facility] directly at [phone].  Normal or non-critical lab and imaging results will be communicated to you by MyChart, letter or phone within 4 business days after the clinic has received the results. If you do not hear from us within 7 days, please contact the clinic through [employer] or phone. If you have a critical or abnormal lab result, we will notify you by phone as soon as possible.  Submit refill requests through [employer] or call your pharmacy and they will forward the refill request to us. Please allow 3 business days for your refill to be completed.          Additional Information About Your Visit        MyChart Information     [employer] gives you secure access to your electronic health record. If you see a primary care provider, you can also send messages to your care team and make appointments. If you have questions, please call your primary care clinic.  If you do not have a primary care provider, please call [phone] and they will assist you.        Care EveryWhere ID     This is your Care EveryWhere ID. This could be used by other organizations to access your  "[facility] medical records  [id number]        Your Vitals Were     Pulse Temperature Height Last Period BMI (Body Mass Index)       72 98.4  F (36.9  C) (Oral) 4' 11\" (1.499 m) 06/05/2017 22.7 kg/m2        Blood Pressure from Last 3 Encounters:   07/10/17 106/62   06/15/17 112/72   11/29/16 110/68    Weight from Last 3 Encounters:   07/10/17 112 lb 6 oz (51 kg)   06/15/17 120 lb (54.4 kg)   11/29/16 118 lb 4 oz (53.6 kg)              We Performed the Following     Beta HCG qual IFA urine     COLP CERVIX/UPPER VAGINA W BX CERVIX/ENDOCERV CURETT     Surgical pathology exam        Primary Care Provider Office Phone # Fax #    Naima SaulDO [phone]       [facility] [address] [place] [address]        Equal Access to Services     CHECO RESTREPO AH: Hadii yesenia ku hadasho Soomaali, waaxda luqadaha, qaybta kaalmada adeegyada, waxay issain hayliman jesse cesar . So [facility] [phone].    ATENCIÓN: Si jesus wilkerson, tiene a estrada disposición servicios gratuitos de asistencia lingüística. Llame al [phone].    We comply with applicable federal civil rights laws and [place] laws. We do not discriminate on the basis of race, color, national origin, age, disability sex, sexual orientation or gender identity.            Thank you!     Thank you for choosing [facility]  for your care. Our goal is always to provide you with excellent care. Hearing back from our patients is one way we can continue to improve our services. Please take a few minutes to complete the written survey that you may receive in the mail after your visit with us. Thank you!             Your Updated Medication List - Protect others around you: Learn how to safely use, store and throw away your medicines at www.disposemymeds.org.          This list is accurate as of: 7/10/17 11:59 PM.  Always use your most recent med list.                   Brand Name Dispense Instructions for use " Diagnosis    ascorbic acid 250 MG tablet    VITAMIN C     Take 250 mg by mouth daily.        DAILY MULTIVITAMIN PO      Take  by mouth.        Fish Oil Oil      daily.        * order for DME     1 Device    Equipment being ordered: right prosthetic leg    History of leg amputation (H)       * order for DME     1 Device    Equipment being ordered: crutches    History of leg amputation (H)       * Notice:  This list has 2 medication(s) that are the same as other medications prescribed for you. Read the directions carefully, and ask your doctor or other care provider to review them with you.

## 2017-07-13 LAB — COPATH REPORT: NORMAL

## 2017-07-31 ENCOUNTER — TELEPHONE (OUTPATIENT)
Dept: FAMILY MEDICINE | Facility: CLINIC | Age: 52
End: 2017-07-31

## 2017-07-31 NOTE — TELEPHONE ENCOUNTER
Forms received from [facility]: Plan of Progress, 7/3/17 for Naima Saul DO.  Forms placed in provider 'sign me' folder.  Please fax forms to [phone] after completion.    Cherelle Garner,

## 2017-08-04 ENCOUNTER — TELEPHONE (OUTPATIENT)
Dept: FAMILY MEDICINE | Facility: CLINIC | Age: 52
End: 2017-08-04

## 2017-08-04 NOTE — TELEPHONE ENCOUNTER
Forms received from [facility]: Pouch Urine w Barr 3/4 x 1 for Naima Saul DO.  Forms placed in provider 'sign me' folder.  Please mail forms in attached envelope after completion.    Cherelle Garner,

## 2018-08-11 ENCOUNTER — HEALTH MAINTENANCE LETTER (OUTPATIENT)
Age: 53
End: 2018-08-11

## 2018-09-27 ENCOUNTER — OFFICE VISIT (OUTPATIENT)
Dept: FAMILY MEDICINE | Facility: CLINIC | Age: 53
End: 2018-09-27
Payer: MEDICARE

## 2018-09-27 VITALS
BODY MASS INDEX: 22.98 KG/M2 | HEART RATE: 68 BPM | TEMPERATURE: 98.4 F | DIASTOLIC BLOOD PRESSURE: 68 MMHG | WEIGHT: 114 LBS | HEIGHT: 59 IN | SYSTOLIC BLOOD PRESSURE: 108 MMHG

## 2018-09-27 DIAGNOSIS — Z12.4 SCREENING FOR MALIGNANT NEOPLASM OF CERVIX: ICD-10-CM

## 2018-09-27 DIAGNOSIS — Z89.619 HISTORY OF LEG AMPUTATION (H): ICD-10-CM

## 2018-09-27 DIAGNOSIS — Q05.9 SPINA BIFIDA WITHOUT HYDROCEPHALUS, UNSPECIFIED SPINAL REGION (H): ICD-10-CM

## 2018-09-27 DIAGNOSIS — M70.51 PATELLAR BURSITIS OF RIGHT KNEE: ICD-10-CM

## 2018-09-27 DIAGNOSIS — Z43.6 ATTENTION TO UROSTOMY (H): ICD-10-CM

## 2018-09-27 DIAGNOSIS — N63.0 LUMP OR MASS IN BREAST: ICD-10-CM

## 2018-09-27 DIAGNOSIS — G44.209 TENSION HEADACHE: ICD-10-CM

## 2018-09-27 DIAGNOSIS — Z00.00 ROUTINE GENERAL MEDICAL EXAMINATION AT A HEALTH CARE FACILITY: Primary | ICD-10-CM

## 2018-09-27 DIAGNOSIS — Z12.31 ENCOUNTER FOR SCREENING MAMMOGRAM FOR MALIGNANT NEOPLASM OF BREAST: ICD-10-CM

## 2018-09-27 DIAGNOSIS — G40.909 SEIZURE DISORDER (H): ICD-10-CM

## 2018-09-27 PROCEDURE — G0476 HPV COMBO ASSAY CA SCREEN: HCPCS | Performed by: FAMILY MEDICINE

## 2018-09-27 PROCEDURE — G0145 SCR C/V CYTO,THINLAYER,RESCR: HCPCS | Performed by: FAMILY MEDICINE

## 2018-09-27 PROCEDURE — 99214 OFFICE O/P EST MOD 30 MIN: CPT | Mod: 25 | Performed by: FAMILY MEDICINE

## 2018-09-27 PROCEDURE — 87624 HPV HI-RISK TYP POOLED RSLT: CPT | Performed by: FAMILY MEDICINE

## 2018-09-27 PROCEDURE — G0439 PPPS, SUBSEQ VISIT: HCPCS | Performed by: FAMILY MEDICINE

## 2018-09-27 ASSESSMENT — ENCOUNTER SYMPTOMS
COUGH: 0
FEVER: 0
ABDOMINAL PAIN: 0
HEADACHES: 1
WEAKNESS: 0
SORE THROAT: 0
ARTHRALGIAS: 0
PARESTHESIAS: 0
FREQUENCY: 0
PALPITATIONS: 0
HEMATURIA: 0
MYALGIAS: 1
CONSTIPATION: 0
DYSURIA: 0
NAUSEA: 0
DIARRHEA: 0
BREAST MASS: 1
DIZZINESS: 0
EYE PAIN: 1
CHILLS: 0
SHORTNESS OF BREATH: 0
NERVOUS/ANXIOUS: 0
HEARTBURN: 0
HEMATOCHEZIA: 0
JOINT SWELLING: 0

## 2018-09-27 NOTE — PATIENT INSTRUCTIONS
Try 2 extra strength tylenol for your headache.     Get fitted for a new prosthetic. You should see the orthopedist to address the bursitis first.     Get your mammogram.           Preventive Health Recommendations    Female Ages 65 +    Yearly exam:     See your health care provider every year in order to  o Review health changes.   o Discuss preventive care.    o Review your medicines if your doctor has prescribed any.      You no longer need a yearly Pap test unless you've had an abnormal Pap test in the past 10 years. If you have vaginal symptoms, such as bleeding or discharge, be sure to talk with your provider about a Pap test.      Every 1 to 2 years, have a mammogram.  If you are over 69, talk with your health care provider about whether or not you want to continue having screening mammograms.      Every 10 years, have a colonoscopy. Or, have a yearly FIT test (stool test). These exams will check for colon cancer.       Have a cholesterol test every 5 years, or more often if your doctor advises it.       Have a diabetes test (fasting glucose) every three years. If you are at risk for diabetes, you should have this test more often.       At age 65, have a bone density scan (DEXA) to check for osteoporosis (brittle bone disease).    Shots:    Get a flu shot each year.    Get a tetanus shot every 10 years.    Talk to your doctor about your pneumonia vaccines. There are now two you should receive - Pneumovax (PPSV 23) and Prevnar (PCV 13).    Talk to your pharmacist about the shingles vaccine.    Talk to your doctor about the hepatitis B vaccine.    Nutrition:     Eat at least 5 servings of fruits and vegetables each day.      Eat whole-grain bread, whole-wheat pasta and brown rice instead of white grains and rice.      Get adequate Calcium and Vitamin D.     Lifestyle    Exercise at least 150 minutes a week (30 minutes a day, 5 days a week). This will help you control your weight and prevent disease.      Limit  alcohol to one drink per day.      No smoking.       Wear sunscreen to prevent skin cancer.       See your dentist twice a year for an exam and cleaning.      See your eye doctor every 1 to 2 years to screen for conditions such as glaucoma, macular degeneration and cataracts.

## 2018-09-27 NOTE — MR AVS SNAPSHOT
After Visit Summary   9/27/2018    David Grissom    MRN: [id number]           Patient Information     Date Of Birth          1965        Visit Information        Provider Department      9/27/2018 11:00 AM Naima Saul DO [facility]        Today's Diagnoses     Routine general medical examination at a health care facility    -  1    Patellar bursitis of right knee        Lump or mass in breast        Attention to urostomy (H)        History of leg amputation (H)        Tension headache        Spina bifida without hydrocephalus, unspecified spinal region (H)        Seizure disorder (H)        Screening for malignant neoplasm of cervix        Encounter for screening mammogram for malignant neoplasm of breast           Care Instructions    Try 2 extra strength tylenol for your headache.     Get fitted for a new prosthetic. You should see the orthopedist to address the bursitis first.     Get your mammogram.           Preventive Health Recommendations    Female Ages 65 +    Yearly exam:     See your health care provider every year in order to  o Review health changes.   o Discuss preventive care.    o Review your medicines if your doctor has prescribed any.      You no longer need a yearly Pap test unless you've had an abnormal Pap test in the past 10 years. If you have vaginal symptoms, such as bleeding or discharge, be sure to talk with your provider about a Pap test.      Every 1 to 2 years, have a mammogram.  If you are over 69, talk with your health care provider about whether or not you want to continue having screening mammograms.      Every 10 years, have a colonoscopy. Or, have a yearly FIT test (stool test). These exams will check for colon cancer.       Have a cholesterol test every 5 years, or more often if your doctor advises it.       Have a diabetes test (fasting glucose) every three years. If you are at risk for diabetes, you should have this test more often.        At age 65, have a bone density scan (DEXA) to check for osteoporosis (brittle bone disease).    Shots:    Get a flu shot each year.    Get a tetanus shot every 10 years.    Talk to your doctor about your pneumonia vaccines. There are now two you should receive - Pneumovax (PPSV 23) and Prevnar (PCV 13).    Talk to your pharmacist about the shingles vaccine.    Talk to your doctor about the hepatitis B vaccine.    Nutrition:     Eat at least 5 servings of fruits and vegetables each day.      Eat whole-grain bread, whole-wheat pasta and brown rice instead of white grains and rice.      Get adequate Calcium and Vitamin D.     Lifestyle    Exercise at least 150 minutes a week (30 minutes a day, 5 days a week). This will help you control your weight and prevent disease.      Limit alcohol to one drink per day.      No smoking.       Wear sunscreen to prevent skin cancer.       See your dentist twice a year for an exam and cleaning.      See your eye doctor every 1 to 2 years to screen for conditions such as glaucoma, macular degeneration and cataracts.          Follow-ups after your visit        Additional Services     ORTHOTICS REFERRAL       **This referral order prints off in the [facility]  (Orthotics & Prosthetics) Central Scheduling Office**    The [facility] Central Scheduling Staff will contact the patient to schedule appointments.     Central Scheduling Contact Information: [phone] ([place])    Prosthetics Below Knee: Right Prosthesis    Please be aware that coverage of these services is subject to the terms and limitations of your health insurance plan.  Call member services at your health plan with any benefit or coverage questions.      Please bring the following to your appointment:    >>   Any x-rays, CTs or MRIs which have been performed.  Contact the facility where they were done to arrange for  prior to your scheduled appointment.    >>   List of current  medications   >>   This referral request   >>   Any documents/labs given to you for this referral            UROLOGY ADULT REFERRAL       Your provider has [facility] you [facility]: [facility] [phone]   [email or web address]    Please be aware that coverage of these services is subject to the terms and limitations of your health insurance plan.  Call member services at your health plan with any benefit or coverage questions.      Please bring the following with you to your appointment:    (1) Any X-Rays, CTs or MRIs which have been performed.  Contact the facility where they were done to arrange for  prior to your scheduled appointment.    (2) List of current medications  (3) This referral request   (4) Any documents/labs given to you for this referral                  Future tests that were ordered for you today     Open Future Orders        Priority Expected Expires Ordered    MA Screen Bilateral w/Damion Routine  9/27/2019 9/27/2018            Who to contact     If you have questions or need follow up information about today's clinic visit or your schedule please contact [facility] directly at [phone].  Normal or non-critical lab and imaging results will be communicated to you by MyChart, letter or phone within 4 business days after the clinic has received the results. If you do not hear from us within 7 days, please contact the clinic through MyChart or phone. If you have a critical or abnormal lab result, we will notify you by phone as soon as possible.  Submit refill requests through [employer] or call your pharmacy and they will forward the refill request to us. Please allow 3 business days for your refill to be completed.          Additional Information About Your Visit        MyChart Information     [employer] gives you secure access to your electronic health record. If you see a primary care provider, you can also send  "messages to your care team and make appointments. If you have questions, please call your primary care clinic.  If you do not have a primary care provider, please call [phone] and they will assist you.        Care EveryWhere ID     This is your Care EveryWhere ID. This could be used by other organizations to access your [place] medical records  [id number]        Your Vitals Were     Pulse Temperature Height Last Period BMI (Body Mass Index)       68 98.4  F (36.9  C) (Oral) 4' 11\" (1.499 m) 09/03/2018 (Approximate) 23.03 kg/m2        Blood Pressure from Last 3 Encounters:   09/27/18 108/68   07/10/17 106/62   06/15/17 112/72    Weight from Last 3 Encounters:   09/27/18 114 lb (51.7 kg)   07/10/17 112 lb 6 oz (51 kg)   06/15/17 120 lb (54.4 kg)              We Performed the Following     HPV High Risk Types DNA Cervical     ORTHOTICS REFERRAL     Pap imaged thin layer screen with HPV - recommended age 30 - 65 years (select HPV order below)     UROLOGY ADULT REFERRAL        Primary Care Provider Office Phone # Fax #    Naima SaulDO [phone]       [address] [facility] [address]        Equal Access to Services     CHECO RESTREPO : Hadii aad ku hadasho Soomaali, waaxda luqadaha, qaybta kaalmada adeegyada, waxay mariela diaz. So [facility] [phone].    ATENCIÓN: Si habla español, tiene a estrada disposición servicios gratuitos de asistencia lingüística. Llame al [phone].    We comply with applicable federal civil rights laws and [place] laws. We do not discriminate on the basis of race, color, national origin, age, disability, sex, sexual orientation, or gender identity.            Thank you!     Thank you for choosing [facility]  for your care. Our goal is always to provide you with excellent care. Hearing back from our patients is one way we can continue to improve our services. Please take a few minutes to complete the written survey that " you may receive in the mail after your visit with us. Thank you!             Your Updated Medication List - Protect others around you: Learn how to safely use, store and throw away your medicines at www.disposemymeds.org.          This list is accurate as of 9/27/18 11:48 AM.  Always use your most recent med list.                   Brand Name Dispense Instructions for use Diagnosis    ascorbic acid 250 MG tablet    VITAMIN C     Take 250 mg by mouth daily.        DAILY MULTIVITAMIN PO      Take  by mouth.        Fish Oil Oil      daily.        order for DME     1 Device    Equipment being ordered: right prosthetic leg    History of leg amputation (H)       order for DME     1 Device    Equipment being ordered: crutches    History of leg amputation (H)

## 2018-09-27 NOTE — PROGRESS NOTES
SUBJECTIVE:   David Grissom is a 52 year old female who presents for Preventive Visit.      Are you in the first 12 months of your Medicare coverage?  No    Physical   Annual:     Getting at least 3 servings of Calcium per day:  Yes    Bi-annual eye exam:  Yes    Dental care twice a year:  Yes    Sleep apnea or symptoms of sleep apnea:  Daytime drowsiness    Diet:  Regular (no restrictions)    Frequency of exercise:  2-3 days/week    Duration of exercise:  15-30 minutes    Taking medications regularly:  Yes    Medication side effects:  None      Fall risk:  Fall Risk Assessment not completed.    COGNITIVE SCREENING:  Not appropriate due to age      Headache:  Patient reports she has had a headache for about 3 days. She mentions that the severity varies, but there is always a dull ache. 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 She did recently buy a new head set that is the same set on both sides.     Right Lower Extremity:  She has recently noticed some swelling in her right knee in the past couple weeks. She has lost a small amount of weight since she got her prosthetic fit last. She feels that her leg is falling too far into the prosthetic and it is slipping. She has not been walking as much as she should due to her sedentary job.    Additional Comments:  She has some right his pain which she believes she was told was bursitis in the past. She has done PT. She is not interested in an injection.     Patient had a diagnostic mammogram in 2016 to work up breat lump and tenderness. Patient reports the lump is still present and tenderness is intermittent.     Patient's last pap about 1 year ago was ASCUS with two positive HPV risks. She had a colposcopy.     Lab Results   Component Value Date    PAP ASC-US 06/15/2017    PAP NIL 06/10/2014    PAP NIL 04/01/2013           Reviewed and updated as needed this visit by clinical staff  Tobacco  Allergies  Meds  Med Hx  Surg Hx   Fam Hx  Soc Hx        Reviewed and updated as needed this visit by Provider        Social History   Substance Use Topics     Smoking status: Former Smoker     Types: Cigarettes     Quit date: 1/2/1987     Smokeless tobacco: Never Used     Alcohol use 0.0 - 0.6 oz/week     0 - 1 Standard drinks or equivalent per week      Comment: occasional       Alcohol Use 9/27/2018   If you drink alcohol do you typically have greater than 3 drinks per day OR greater than 7 drinks per week? No   No flowsheet data found.      Headache for 3 days, pressure/dull ache around top of her head.  Has taken ibuprofen, but not today    Today's PHQ-2 Score:   PHQ-2 ( 1999 [employer]) 9/27/2018   Q1: Little interest or pleasure in doing things 0   Q2: Feeling down, depressed or hopeless 0   PHQ-2 Score 0   Q1: Little interest or pleasure in doing things Not at all   Q2: Feeling down, depressed or hopeless Not at all   PHQ-2 Score 0       Do you feel safe in your environment - Yes    Do you have a Health Care Directive?: No: Advance care planning reviewed with patient; information given to patient to review.    Current providers sharing in care for this patient include:   Patient Care Team:  Naima Saul DO as PCP - General (Family Practice)    The following health maintenance items are reviewed in Epic and correct as of today:  Health Maintenance   Topic Date Due     ALESHIA QUESTIONNAIRE 1 YEAR  11/16/1983     HIV SCREEN (SYSTEM ASSIGNED)  11/16/1983     PHQ-9 Q1YR  04/16/2016     PAP Q1 YR DIAGNOSTIC  07/10/2018     INFLUENZA VACCINE (1) 09/01/2018     MAMMO SCREEN Q2 YR (SYSTEM ASSIGNED)  12/05/2018     LIPID SCREEN Q5 YR FEMALE (SYSTEM ASSIGNED)  04/18/2021     TETANUS IMMUNIZATION (SYSTEM ASSIGNED)  04/01/2023     COLON CANCER SCREEN (SYSTEM ASSIGNED)  06/01/2026     HEPATITIS C SCREENING  Completed     BP Readings from Last 3 Encounters:   09/27/18 108/68   07/10/17 106/62   06/15/17 112/72    Wt Readings from Last 3 Encounters:   09/27/18  "51.7 kg (114 lb)   07/10/17 51 kg (112 lb 6 oz)   06/15/17 54.4 kg (120 lb)                    Mammogram Screening: Patient over age 50, mutual decision to screen reflected in health maintenance.  History of abnormal Pap smear: YES - updated in Problem List and Health Maintenance accordingly    Review of Systems   Constitutional: Negative for chills and fever.   HENT: Positive for ear pain. Negative for congestion, hearing loss and sore throat.    Eyes: Positive for pain. Negative for visual disturbance.   Respiratory: Negative for cough and shortness of breath.    Cardiovascular: Positive for peripheral edema. Negative for chest pain and palpitations.        Due to prosthetic   Gastrointestinal: Negative for abdominal pain, constipation, diarrhea, heartburn, hematochezia and nausea.   Breasts:  Positive for tenderness and breast mass. Negative for discharge.        This has been worked up about 2 years ago, tenderness is intermittent   Genitourinary: Negative for dysuria, frequency, genital sores, hematuria, pelvic pain, urgency, vaginal bleeding and vaginal discharge.   Musculoskeletal: Positive for myalgias. Negative for arthralgias and joint swelling.   Skin: Negative for rash.   Neurological: Positive for headaches. Negative for dizziness, weakness and paresthesias.   Psychiatric/Behavioral: Negative for mood changes. The patient is not nervous/anxious.      This document serves as a record of the services and decisions personally performed by SILVIANO GUAJARDO. It was created on his/her behalf by Lula Ng, a trained medical scribe. The creation of this document is based on the provider's statements to the medical scribe. Lula Ng, September 27, 2018 11:05 AM    OBJECTIVE:   /68 (Cuff Size: Adult Regular)  Pulse 68  Temp 98.4  F (36.9  C) (Oral)  Ht 1.499 m (4' 11\")  Wt 51.7 kg (114 lb)  LMP 09/03/2018 (Approximate)  BMI 23.03 kg/m2 Estimated body mass index is 23.03 kg/(m^2) as calculated from the " "following:    Height as of this encounter: 1.499 m (4' 11\").    Weight as of this encounter: 51.7 kg (114 lb).  Physical Exam  GENERAL: healthy, alert and no distress  EYES: Eyes grossly normal to inspection, PERRL and conjunctivae and sclerae normal  HENT: ear canals and TM's normal, nose and mouth without ulcers or lesions  NECK: no adenopathy, no asymmetry, masses, or scars and thyroid normal to palpation  RESP: lungs clear to auscultation - no rales, rhonchi or wheezes  BREAST: 1 cm by 1 cm mass in right breast. No tenderness or nipple discharge and no palpable axillary masses or adenopathy  CV: regular rate and rhythm, normal S1 S2, no S3 or S4, no murmur, click or rub, no peripheral edema and peripheral pulses strong  ABDOMEN: soft, nontender, no hepatosplenomegaly, no masses and bowel sounds normal   (female): normal female external genitalia, normal urethral meatus, vaginal mucosa pink, moist, well rugated, and normal cervix/adnexa/uterus without masses or discharge  MS: Massive patellar bursitis 13 cm by 11 cm  SKIN: no suspicious lesions or rashes  NEURO: Normal strength and tone, mentation intact and speech normal  PSYCH: mentation appears normal, affect normal/bright    Diagnostic Test Results:  No results found for this or any previous visit (from the past 24 hour(s)).    ASSESSMENT / PLAN:   (Z00.00) Routine general medical examination at a health care facility  (primary encounter diagnosis)  Comment:   Plan: All other health maintenance updated per chart.    (M70.51) Patellar bursitis of right knee  Comment: Patient has massive patellar bursitis of right knee due to prosthetic  Plan: ORTHOTICS REFERRAL        I advised the patient to see the orthopedist to address the bursitis, then to see orthotics to be fitted for a new prosthetic.    (N63.0) Lump or mass in breast  Comment: Patient has had a breast lump for about 2 years, this has been worked up in the past.  Plan: MA Screen Bilateral w/Damion      " "  I ordered a repeat workup.    (Z43.6) Attention to urostomy (H)  Comment: Patient last saw urology in 2014.   Plan: UROLOGY ADULT REFERRAL        I advised the patient to follow up with urology.     (Z89.619) History of leg amputation (H)  Comment: Stable.  Plan: Continue to monitor.,    (G44.209) Tension headache  Comment: Patient has had a tension headache for about 3 days.  Plan: Patient declined toradol shot. I recommended she take Tylenol instead of NSAID's.    (Q05.9) Spina bifida without hydrocephalus, unspecified spinal region (H)  Comment: Stable  Plan: Continue to monitor.    (G40.909) Seizure disorder (H)  Comment: Patient has been seizure free for years. She is not followed by neurology.  Plan: Continue to monitor.    (Z12.4) Screening for malignant neoplasm of cervix  Comment: Patient had ASCUS pap and colposcopy 1 year ago  Plan: Pap imaged thin layer screen with HPV -         recommended age 30 - 65 years (select HPV order        below), HPV High Risk Types DNA Cervical        Adjust treatment based on labs.    (Z12.31) Encounter for screening mammogram for malignant neoplasm of breast   Comment: See above.   Plan: MA Screen Bilateral w/Damion            End of Life Planning:  Patient currently has an advanced directive: No.  I have verified the patient's ablity to prepare an advanced directive/make health care decisions.  Literature was provided to assist patient in preparing an advanced directive.    COUNSELING:  Reviewed preventive health counseling, as reflected in patient instructions    BP Readings from Last 1 Encounters:   09/27/18 108/68     Estimated body mass index is 23.03 kg/(m^2) as calculated from the following:    Height as of this encounter: 1.499 m (4' 11\").    Weight as of this encounter: 51.7 kg (114 lb).           reports that she quit smoking about 31 years ago. Her smoking use included Cigarettes. She has never used smokeless tobacco.      Appropriate preventive services were " discussed with this patient, including applicable screening as appropriate for cardiovascular disease, diabetes, osteopenia/osteoporosis, and glaucoma.  As appropriate for age/gender, discussed screening for colorectal cancer, prostate cancer, breast cancer, and cervical cancer. Checklist reviewing preventive services available has been given to the patient.    Reviewed patients plan of care and provided an AVS. The Basic Care Plan (routine screening as documented in Health Maintenance) for David meets the Care Plan requirement. This Care Plan has been established and reviewed with the Patient.    Counseling Resources:  ATP IV Guidelines  Pooled Cohorts Equation Calculator  Breast Cancer Risk Calculator  FRAX Risk Assessment  ICSI Preventive Guidelines  Dietary Guidelines for Americans, 2010  [employer]'s MyPlate  ASA Prophylaxis  Lung CA Screening    Naima Saul,   [facility]  Answers for HPI/ROS submitted by the patient on 9/27/2018   PHQ-2 Score: 0    The information in this document, created by the medical scribe Lula Ng for me, accurately reflects the services I personally performed and the decisions made by me. I have reviewed and approved this document for accuracy prior to leaving the patient care area.

## 2018-09-28 ENCOUNTER — TELEPHONE (OUTPATIENT)
Dept: FAMILY MEDICINE | Facility: CLINIC | Age: 53
End: 2018-09-28

## 2018-09-28 DIAGNOSIS — N63.0 BREAST MASS: Primary | ICD-10-CM

## 2018-09-28 DIAGNOSIS — N63.12 MASS OF UPPER INNER QUADRANT OF RIGHT BREAST: ICD-10-CM

## 2018-09-28 NOTE — TELEPHONE ENCOUNTER
Reason for Call: Request for an order or referral:    Order or referral being requested: diagnostic mammogram    Date needed: as soon as possible    Has the patient been seen by the PCP for this problem? YES    Additional comments: Patient states that she saw Dr. Saul yesterday, and was told that she needs to have a mammo and an ultrasound of her breast due to the mass she has. Patient called the [facility] to schedule and they told her that her provider needs to put in an order for a 'diagnostic' mammogram. Please call patient to inform her when order is place    Phone number Patient can be reached at:  Home number on file [phone] (home)    Best Time:  At work 11-7pm today    Can we leave a detailed message on this number?  YES    Call taken on 9/28/2018 at 9:15 AM by Bassam Perez

## 2018-10-02 ENCOUNTER — RADIANT APPOINTMENT (OUTPATIENT)
Dept: ULTRASOUND IMAGING | Facility: CLINIC | Age: 53
End: 2018-10-02
Attending: FAMILY MEDICINE
Payer: MEDICARE

## 2018-10-02 ENCOUNTER — RADIANT APPOINTMENT (OUTPATIENT)
Dept: MAMMOGRAPHY | Facility: CLINIC | Age: 53
End: 2018-10-02
Attending: FAMILY MEDICINE
Payer: MEDICARE

## 2018-10-02 ENCOUNTER — TELEPHONE (OUTPATIENT)
Dept: UROLOGY | Facility: CLINIC | Age: 53
End: 2018-10-02

## 2018-10-02 DIAGNOSIS — N63.0 BREAST MASS: ICD-10-CM

## 2018-10-02 DIAGNOSIS — N63.12 MASS OF UPPER INNER QUADRANT OF RIGHT BREAST: ICD-10-CM

## 2018-10-02 LAB
COPATH REPORT: NORMAL
PAP: NORMAL

## 2018-10-02 PROCEDURE — 76642 ULTRASOUND BREAST LIMITED: CPT | Mod: 50 | Performed by: STUDENT IN AN ORGANIZED HEALTH CARE EDUCATION/TRAINING PROGRAM

## 2018-10-02 PROCEDURE — 77066 DX MAMMO INCL CAD BI: CPT | Performed by: STUDENT IN AN ORGANIZED HEALTH CARE EDUCATION/TRAINING PROGRAM

## 2018-10-02 NOTE — TELEPHONE ENCOUNTER
Called and spoke to patient.   Patient is scheduled to see Dr. Vazquez however he does not treat patients for her condition.   Information provided for [facility].  Lucía Bateman RN

## 2018-10-02 NOTE — TELEPHONE ENCOUNTER
Reason for Call:  Other returning call    Detailed comments: Patient states she was returning a call. No encounter noted.    Phone Number Patient can be reached at: Home number on file [phone] (home)    Best Time: Anytime    Can we leave a detailed message on this number? YES    Call taken on 10/2/2018 at 12:40 PM by Stephanie Campbell

## 2018-10-03 LAB
FINAL DIAGNOSIS: NORMAL
HPV HR 12 DNA CVX QL NAA+PROBE: NEGATIVE
HPV16 DNA SPEC QL NAA+PROBE: NEGATIVE
HPV18 DNA SPEC QL NAA+PROBE: NEGATIVE
SPECIMEN DESCRIPTION: NORMAL
SPECIMEN SOURCE CVX/VAG CYTO: NORMAL

## 2018-10-23 ENCOUNTER — TELEPHONE (OUTPATIENT)
Dept: FAMILY MEDICINE | Facility: CLINIC | Age: 53
End: 2018-10-23

## 2018-10-23 NOTE — TELEPHONE ENCOUNTER
Reason for Call: Request for an order or referral:    Order or referral being requested: Order/Referral    Date needed: as soon as possible    Has the patient been seen by the PCP for this problem? n/a    Additional comments: Patient requesting for order/referral/prescription for brace. Please advise.     Phone number Patient can be reached at:  Home number on file [phone] (home)    Best Time:  Anytime    Can we leave a detailed message on this number?  YES    Call taken on 10/23/2018 at 4:10 PM by Hope Ramos

## 2018-10-23 NOTE — TELEPHONE ENCOUNTER
Need more information.     Patient/family was instructed to return call to [facility] RN directly on the RN Call back line at [phone].     Bassam Meza RN

## 2018-10-24 ENCOUNTER — TELEPHONE (OUTPATIENT)
Dept: FAMILY MEDICINE | Facility: CLINIC | Age: 53
End: 2018-10-24

## 2018-10-24 NOTE — TELEPHONE ENCOUNTER
Forms received from [facility]: Adult Rehab services order for Naima Saul DO.  Forms placed in provider 'sign me' folder.  Please fax forms to [phone] after completion.    Thanks!  Bassam Perez

## 2018-10-29 ENCOUNTER — TELEPHONE (OUTPATIENT)
Dept: FAMILY MEDICINE | Facility: CLINIC | Age: 53
End: 2018-10-29

## 2018-10-29 NOTE — TELEPHONE ENCOUNTER
Forms received from [facility]: detailed order, pouch urine for Naima Murphymal, .  Forms placed in provider 'sign me' folder.  Please mail forms to [facility]/ [address] [address] after completion.    Thanks!  Bassam Perez

## 2018-10-31 ENCOUNTER — OFFICE VISIT (OUTPATIENT)
Dept: FAMILY MEDICINE | Facility: CLINIC | Age: 53
End: 2018-10-31
Payer: MEDICARE

## 2018-10-31 VITALS
WEIGHT: 115 LBS | HEIGHT: 59 IN | TEMPERATURE: 98.4 F | HEART RATE: 66 BPM | SYSTOLIC BLOOD PRESSURE: 110 MMHG | DIASTOLIC BLOOD PRESSURE: 60 MMHG | BODY MASS INDEX: 23.18 KG/M2

## 2018-10-31 DIAGNOSIS — M25.461 KNEE EFFUSION, RIGHT: Primary | ICD-10-CM

## 2018-10-31 PROCEDURE — 99213 OFFICE O/P EST LOW 20 MIN: CPT | Performed by: NURSE PRACTITIONER

## 2018-10-31 ASSESSMENT — ANXIETY QUESTIONNAIRES
2. NOT BEING ABLE TO STOP OR CONTROL WORRYING: SEVERAL DAYS
5. BEING SO RESTLESS THAT IT IS HARD TO SIT STILL: SEVERAL DAYS
3. WORRYING TOO MUCH ABOUT DIFFERENT THINGS: SEVERAL DAYS
IF YOU CHECKED OFF ANY PROBLEMS ON THIS QUESTIONNAIRE, HOW DIFFICULT HAVE THESE PROBLEMS MADE IT FOR YOU TO DO YOUR WORK, TAKE CARE OF THINGS AT HOME, OR GET ALONG WITH OTHER PEOPLE: SOMEWHAT DIFFICULT
7. FEELING AFRAID AS IF SOMETHING AWFUL MIGHT HAPPEN: NOT AT ALL
1. FEELING NERVOUS, ANXIOUS, OR ON EDGE: SEVERAL DAYS
6. BECOMING EASILY ANNOYED OR IRRITABLE: NOT AT ALL
GAD7 TOTAL SCORE: 5

## 2018-10-31 ASSESSMENT — PATIENT HEALTH QUESTIONNAIRE - PHQ9
5. POOR APPETITE OR OVEREATING: SEVERAL DAYS
SUM OF ALL RESPONSES TO PHQ QUESTIONS 1-9: 5

## 2018-10-31 NOTE — PATIENT INSTRUCTIONS
Please go to walk in ortho clinic for evaluation of your right knee.     Take ibuprofen 400-600 mg every 6 hrs for pain    Ice knee

## 2018-10-31 NOTE — MR AVS SNAPSHOT
"              After Visit Summary   10/31/2018    David Grissom    MRN: [id number]           Patient Information     Date Of Birth          1965        Visit Information        Provider Department      10/31/2018 1:00 PM Naheed Bull APRN CNP [facility]        Today's Diagnoses     Screening for HIV (human immunodeficiency virus)    -  1       Follow-ups after your visit        Who to contact     If you have questions or need follow up information about today's clinic visit or your schedule please contact [facility] directly at [phone].  Normal or non-critical lab and imaging results will be communicated to you by Zazoomhart, letter or phone within 4 business days after the clinic has received the results. If you do not hear from us within 7 days, please contact the clinic through Zazoomhart or phone. If you have a critical or abnormal lab result, we will notify you by phone as soon as possible.  Submit refill requests through Buyoo or call your pharmacy and they will forward the refill request to us. Please allow 3 business days for your refill to be completed.          Additional Information About Your Visit        MyChart Information     Buyoo gives you secure access to your electronic health record. If you see a primary care provider, you can also send messages to your care team and make appointments. If you have questions, please call your primary care clinic.  If you do not have a primary care provider, please call [phone] and they will assist you.        Care EveryWhere ID     This is your Care EveryWhere ID. This could be used by other organizations to access your [place] [facility] medical records  [id number]        Your Vitals Were     Pulse Temperature Height BMI (Body Mass Index)          66 98.4  F (36.9  C) (Oral) 4' 11\" (1.499 m) 23.23 kg/m2         Blood Pressure from Last 3 Encounters:   10/31/18 110/60   09/27/18 108/68   07/10/17 " 106/62    Weight from Last 3 Encounters:   10/31/18 115 lb (52.2 kg)   09/27/18 114 lb (51.7 kg)   07/10/17 112 lb 6 oz (51 kg)              Today, you had the following     No orders found for display       Primary Care Provider Office Phone # Fax Dominique Saul [phone]       [address] [facility] [address]        Equal Access to Services     CHECO RESTREPO : Hadii aad ku hadasho Soomaali, waaxda luqadaha, qaybta kaalmada adeegyada, waxay idiin hayaan adeeg oralia cesar . So [facility] [phone].    ATENCIÓN: Si jesus wilkerson, tiene a estrada disposición servicios gratuitos de asistencia lingüística. Llame al [phone].    We comply with applicable federal civil rights laws and [place] laws. We do not discriminate on the basis of race, color, national origin, age, disability, sex, sexual orientation, or gender identity.            Thank you!     Thank you for choosing [facility]  for your care. Our goal is always to provide you with excellent care. Hearing back from our patients is one way we can continue to improve our services. Please take a few minutes to complete the written survey that you may receive in the mail after your visit with us. Thank you!             Your Updated Medication List - Protect others around you: Learn how to safely use, store and throw away your medicines at www.disposemymeds.org.          This list is accurate as of 10/31/18  1:28 PM.  Always use your most recent med list.                   Brand Name Dispense Instructions for use Diagnosis    ascorbic acid 250 MG tablet    VITAMIN C     Take 250 mg by mouth daily.        DAILY MULTIVITAMIN PO      Take  by mouth.        Fish Oil Oil      daily.        order for DME     1 Device    Equipment being ordered: right prosthetic leg    History of leg amputation (H)       order for DME     1 Device    Equipment being ordered: crutches    History of leg amputation (H)

## 2018-10-31 NOTE — PROGRESS NOTES
SUBJECTIVE:   David Grissom is a 52 year old female who presents to clinic today for the following health issues:      Concern - Edema with right leg  Onset: Few months    Description:   Swelling with right thigh/knee-- is in the process of getting a new prosthesis but they can't until her swelling goes down    Intensity: mild    Progression of Symptoms:  same    Accompanying Signs & Symptoms:  Swelling down to about knee area    Previous history of similar problem:   Mentioned it at her last visit with Dr. Saul    Precipitating factors:   Worsened by: None    Alleviating factors:  Improved by: None    Therapies Tried and outcome: None  She was seen by PCP on 9/27/2018 for the same issue. Per note was advised to see ortho for bursitis then go see orthotics to have brace fitted once bursitis and swelling resolved. She mistakenly went to orthotics to have a new prosthetic measured which is underway but she never made it to ortho to evaluate her  knee Today her swelling is unchanged from her visit 1 month ago with PCP. She has not really done anything to help reduce the swelling, no NSAID use, no ice or compression.       Problem list and histories reviewed & adjusted, as indicated.  Additional history: as documented    Patient Active Problem List   Diagnosis     CARDIOVASCULAR SCREENING; LDL GOAL LESS THAN 130     Spina bifida (H)     Seizure disorder (H)     History of recurrent UTIs     Thyroid nodule     Nontoxic multinodular goiter     History of leg amputation (H)     Attention to urostomy (H)     ASCUS with positive high risk HPV cervical     Chronic bilateral low back pain without sciatica     Past Surgical History:   Procedure Laterality Date     GYN SURGERY  1999    Unilateral oopherectomy     ORTHOPEDIC SURGERY      Multiple surgeries on back, etc.      UROLOGY PROCEDURE                         DATE:  1970    Urostomy       Social History   Substance Use Topics     Smoking status: Former Smoker      "Types: Cigarettes     Quit date: 1/2/1987     Smokeless tobacco: Never Used     Alcohol use 0.0 - 0.6 oz/week     0 - 1 Standard drinks or equivalent per week      Comment: occasional     Family History   Problem Relation Age of Onset     Diabetes Mother      Hypertension Mother      HEART DISEASE Maternal Grandmother      HEART DISEASE Maternal Grandfather          Current Outpatient Prescriptions   Medication Sig Dispense Refill     Fish Oil OIL daily.       Multiple Vitamin (DAILY MULTIVITAMIN PO) Take  by mouth.       ORDER FOR DME Equipment being ordered: right prosthetic leg 1 Device 0     ORDER FOR DME Equipment being ordered: crutches 1 Device 0     vitamin   C (VITAMIN C) 250 MG tablet Take 250 mg by mouth daily.         Reviewed and updated as needed this visit by clinical staff  Tobacco  Allergies  Meds  Med Hx  Surg Hx  Fam Hx  Soc Hx      Reviewed and updated as needed this visit by Provider         ROS:  Constitutional, HEENT, cardiovascular, pulmonary, GI, , musculoskeletal, neuro, skin, endocrine and psych systems are negative, except as otherwise noted.    OBJECTIVE:     /60 (BP Location: Right arm, Patient Position: Sitting, Cuff Size: Adult Regular)  Pulse 66  Temp 98.4  F (36.9  C) (Oral)  Ht 4' 11\" (1.499 m)  Wt 115 lb (52.2 kg)  BMI 23.23 kg/m2  Body mass index is 23.23 kg/(m^2).  GENERAL: healthy, alert and no distress  NECK: no adenopathy, no asymmetry, masses, or scars and thyroid normal to palpation  RESP: lungs clear to auscultation - no rales, rhonchi or wheezes  CV: regular rate and rhythm, normal S1 S2, no S3 or S4, no murmur, click or rub, no peripheral edema and peripheral pulses strong  ABDOMEN: soft, nontender, no hepatosplenomegaly, no masses and bowel sounds normal  MS: massive swelling of her right knee. She has a BKA.    SKIN: some scattered reddish lesion above.     Diagnostic Test Results:  none     ASSESSMENT/PLAN:     1. Knee effusion, right  Massive " swelling of her right knee. I gave her the number to get in to see ortho today and advised her to use ibuprofen, ice, and compression.       YVROSE Lazo CNP  [facility]

## 2018-11-01 ASSESSMENT — ANXIETY QUESTIONNAIRES: GAD7 TOTAL SCORE: 5

## 2018-11-02 NOTE — TELEPHONE ENCOUNTER
Patient/family was instructed to return call to [facility] [place] RN directly on the RN Call back line at [phone].     Bassam Meza RN

## 2018-11-06 ENCOUNTER — OFFICE VISIT (OUTPATIENT)
Dept: ORTHOPEDICS | Facility: CLINIC | Age: 53
End: 2018-11-06
Payer: MEDICARE

## 2018-11-06 VITALS
HEIGHT: 59 IN | WEIGHT: 115 LBS | BODY MASS INDEX: 23.18 KG/M2 | SYSTOLIC BLOOD PRESSURE: 131 MMHG | DIASTOLIC BLOOD PRESSURE: 85 MMHG

## 2018-11-06 DIAGNOSIS — M70.41 PREPATELLAR BURSITIS OF RIGHT KNEE: ICD-10-CM

## 2018-11-06 PROCEDURE — 99203 OFFICE O/P NEW LOW 30 MIN: CPT | Performed by: FAMILY MEDICINE

## 2018-11-06 NOTE — LETTER
11/6/2018         RE: David Grissom  [address]  [place] [facility] [address]        Dear Colleague,    Thank you for referring your patient, David Grissom, to the [facility] [place]. Please see a copy of my visit note below.    ASSESSMENT & PLAN  David was seen today for pain.    Diagnoses and all orders for this visit:    Below knee amputation status, right (H)  -     PMR Referral    Prepatellar bursitis of right knee  -     PMR Referral    She is a 52-year-old female with past medical history of right BKA as a teenager presenting for evaluation of significant prepatellar bursitis with mild erythema overlying the skin in relation to wearing the old prosthesis.  Patient is unable to get fitted for a new prosthesis secondary to current swelling.  Given this we will have her be evaluated by physical medicine rehabilitation on a stat basis, have her continue wearing the current prosthesis as she is able to and position the sliding pad over her patella. Concerning signs/sx that would warrant urgent evaluation were discussed.  All questions were answered, patient understands and agrees with plan.      -----    SUBJECTIVE  David Grissom is a/an 52 year old female who is seen in consultation at the request of  Naima Saul D.O. for evaluation of right knee pain. The patient is seen by themselves.    Onset: 3 month(s) ago. Reports insidious onset without acute precipitating event.  She is in the process of getting a new prosthetic, however she was told that she would have to wait until her swelling went down.  No nausea, vomiting, fever, chills.  She does have a history of talus in the past over her BKA site.  Current mild erythema with swelling over her anterior patella has been stable over the past couple months.  Patient does not feel it is infected at this time.  She is hopeful to get some improvement of her swelling so that she can get a new prosthesis.  She is  "uncertain the age of the current one but believes it is at least 6-7 years old.  Location of Pain: right knee  Rating of Pain at worst: 6/10  Rating of Pain Currently: 1/10  Worsened by: prolonged sitting and standing   Better with: rest   Treatments tried: ibuprofen  Associated symptoms: swelling  Orthopedic history: YES   Relevant surgical history: Yes- history of leg amputation   Past Medical History:   Diagnosis Date     ASCUS with positive high risk HPV cervical 06/15/2017    type 16 & other HR HPV     Spina bifida (H)      Social History     Social History     Marital status: Single     Spouse name: N/A     Number of children: N/A     Years of education: N/A     Occupational History     [employer]     Social History Main Topics     Smoking status: Former Smoker     Types: Cigarettes     Quit date: 1/2/1987     Smokeless tobacco: Never Used     Alcohol use 0.0 - 0.6 oz/week     0 - 1 Standard drinks or equivalent per week      Comment: occasional     Drug use: No     Sexual activity: Yes     Partners: Male     Other Topics Concern     Parent/Sibling W/ Cabg, Mi Or Angioplasty Before 65f 55m? No     Social History Narrative         Patient's past medical, surgical, social, and family histories were reviewed today and no changes are noted.    REVIEW OF SYSTEMS:  10 point ROS is negative other than symptoms noted above in HPI, Past Medical History or as stated below  Constitutional: NEGATIVE for fever, chills, change in weight  Skin: NEGATIVE for worrisome rashes, moles or lesions  GI/: NEGATIVE for bowel or bladder changes  Neuro: NEGATIVE for weakness, dizziness or paresthesias    OBJECTIVE:  /85  Ht 4' 11\" (1.499 m)  Wt 115 lb (52.2 kg)  BMI 23.23 kg/m2   General: healthy, alert and in no distress  HEENT: no scleral icterus or conjunctival erythema  Skin: no suspicious lesions or rash. No jaundice.  CV: no pedal edema  Resp: normal respiratory effort without conversational " dyspnea   Psych: normal mood and affect  Gait: normal steady gait with appropriate coordination and balance  Neuro: Normal light sensory exam of lower extremity  MSK:  RIGHT KNEE  Inspection:  Below the knee amputation on right lower extremity with mild erythema over the anterior patella and prominent fluid collection in the prepatellar bursa  Palpation:  No tenderness to palpation over the prepatellar bursa, no significant effusion appreciated      Patellofemoral crepitus is Absent  Range of Motion:     00 extension to 1350 flexion  Strength:  Limited ability to test flexion/extension of the knee due to BKA    Independent visualization of the below image:  No results found for this or any previous visit (from the past 24 hour(s)).      Patient's conditions were thoroughly discussed during today's visit with greater than 50% of the visit spent counseling the patient with total time spent face-to-face with the patient being 30 minutes.    Campos Esqueda MD, [facility]      Again, thank you for allowing me to participate in the care of your patient.        Sincerely,        Campos Esqueda MD

## 2018-11-06 NOTE — MR AVS SNAPSHOT
After Visit Summary   11/6/2018    David Grissom    MRN: [id number]           Patient Information     Date Of Birth          1965        Visit Information        Provider Department      11/6/2018 11:40 AM Campos Esqueda MD [facility]        Today's Diagnoses     Below knee amputation status, right (H)    -  1    Prepatellar bursitis of right knee          Care Instructions    1) Schedule evaluation with physical medicine and rehabilitation (PM&R)  2) You can continue wearing prosthetic until follow-up  3) Go to your primary care provider or urgent care if you experience increased redness/swelling/drainage or fever/chills    Thank you for coming in today!    Campos Esqueda            Follow-ups after your visit        Additional Services     PMR Referral       Your provider has referred you to: [facility]: Physical Medicine and Rehabilitation Clinic [facility] [phone]   [email or web address]     Please note these locations do not prescribe narcotics or manage chronic pain.    Please be aware that coverage of these services is subject to the terms and limitations of your health insurance plan.  Call member services at your health plan with any benefit or coverage questions.      Please bring the following to your appointment:  >>   Any x-rays, CTs or MRIs which have been performed.  Contact the facility where they were done to arrange for  prior to your scheduled appointment.    >>   List of current medications   >>   This referral request   >>   Any documents/labs given to you for this referral                  Follow-up notes from your care team     Return if symptoms worsen or fail to improve.      Who to contact     If you have questions or need follow up information about today's clinic visit or your schedule please contact [facility] directly at [phone].  Normal or non-critical lab and imaging  "results will be communicated to you by MyChart, letter or phone within 4 business days after the clinic has received the results. If you do not hear from us within 7 days, please contact the clinic through Datanyzet or phone. If you have a critical or abnormal lab result, we will notify you by phone as soon as possible.  Submit refill requests through [employer] or call your pharmacy and they will forward the refill request to us. Please allow 3 business days for your refill to be completed.          Additional Information About Your Visit        SiteJabber[employer]Apmetrix Information     [employer] gives you secure access to your electronic health record. If you see a primary care provider, you can also send messages to your care team and make appointments. If you have questions, please call your primary care clinic.  If you do not have a primary care provider, please call [phone] and they will assist you.        Care EveryWhere ID     This is your Care EveryWhere ID. This could be used by other organizations to access your [facility] medical records  [id number]        Your Vitals Were     Height BMI (Body Mass Index)                4' 11\" (1.499 m) 23.23 kg/m2           Blood Pressure from Last 3 Encounters:   11/06/18 131/85   10/31/18 110/60   09/27/18 108/68    Weight from Last 3 Encounters:   11/06/18 115 lb (52.2 kg)   10/31/18 115 lb (52.2 kg)   09/27/18 114 lb (51.7 kg)              We Performed the Following     PMR Referral        Primary Care Provider Office Phone # Fax #    Naima DO Kg [phone] [address] [facility] [address]        Equal Access to Services     [facility]: Hadii yesenia nascimento hadasho Sonaomieali, waaxda luqadaha, qaybta kaalmada tashi, mayank diaz. So [facility] [phone].    ATENCIÓN: Si habla español, tiene a estrada disposición servicios gratuitos de asistencia lingüística. Llame al [phone].    We comply with applicable federal civil rights " laws and [place] laws. We do not discriminate on the basis of race, color, national origin, age, disability, sex, sexual orientation, or gender identity.            Thank you!     Thank you for choosing [facility] [place]  for your care. Our goal is always to provide you with excellent care. Hearing back from our patients is one way we can continue to improve our services. Please take a few minutes to complete the written survey that you may receive in the mail after your visit with us. Thank you!             Your Updated Medication List - Protect others around you: Learn how to safely use, store and throw away your medicines at www.disposemymeds.org.          This list is accurate as of 11/6/18 12:19 PM.  Always use your most recent med list.                   Brand Name Dispense Instructions for use Diagnosis    ascorbic acid 250 MG tablet    VITAMIN C     Take 250 mg by mouth daily.        DAILY MULTIVITAMIN PO      Take  by mouth.        Fish Oil Oil      daily.        order for DME     1 Device    Equipment being ordered: right prosthetic leg    History of leg amputation (H)       order for DME     1 Device    Equipment being ordered: crutches    History of leg amputation (H)

## 2018-11-06 NOTE — PATIENT INSTRUCTIONS
1) Schedule evaluation with physical medicine and rehabilitation (PM&R)  2) You can continue wearing prosthetic until follow-up  3) Go to your primary care provider or urgent care if you experience increased redness/swelling/drainage or fever/chills    Thank you for coming in today!    Campos Esqueda

## 2018-11-07 ENCOUNTER — TELEPHONE (OUTPATIENT)
Dept: FAMILY MEDICINE | Facility: CLINIC | Age: 53
End: 2018-11-07

## 2018-11-07 NOTE — TELEPHONE ENCOUNTER
Forms received from [facility]: confirmation of order for Naima Saul DO.  Forms placed in provider 'sign me' folder.  Please fax forms to [phone] after completion.    Thanks!  Bassam Perez

## 2018-11-12 ENCOUNTER — TELEPHONE (OUTPATIENT)
Dept: FAMILY MEDICINE | Facility: CLINIC | Age: 53
End: 2018-11-12

## 2018-11-12 NOTE — TELEPHONE ENCOUNTER
Forms received from [facility]: confirmation of order for Naima Saul DO.  Forms placed in provider 'sign me' folder.  Please fax forms to [phone] after completion.    Thanks!  Bassam Peerz

## 2018-11-12 NOTE — TELEPHONE ENCOUNTER
Forms received from [facility]: Confirmation of Order for Naima Saul DO.  Forms placed in provider 'sign me' folder.  Please fax forms to [phone] after completion.    Thanks!  Bassam Perez

## 2018-11-27 ENCOUNTER — TELEPHONE (OUTPATIENT)
Dept: PHYSICAL MEDICINE AND REHAB | Facility: CLINIC | Age: 53
End: 2018-11-27

## 2018-11-27 NOTE — TELEPHONE ENCOUNTER
[facility] Call Center    Phone Message    May a detailed message be left on voicemail: yes    Reason for Call: Referral: Below knee amputation status, right, swollen, full of fluid, sore. Would like to be seen asap as this has been going on for a couple of months.     Action Taken: Message routed to:  [facility] PMR

## 2019-01-09 ENCOUNTER — OFFICE VISIT (OUTPATIENT)
Dept: PHYSICAL MEDICINE AND REHAB | Facility: CLINIC | Age: 54
End: 2019-01-09
Payer: MEDICARE

## 2019-01-09 VITALS
DIASTOLIC BLOOD PRESSURE: 62 MMHG | BODY MASS INDEX: 22.58 KG/M2 | WEIGHT: 115 LBS | SYSTOLIC BLOOD PRESSURE: 115 MMHG | HEIGHT: 60 IN | HEART RATE: 69 BPM

## 2019-01-09 DIAGNOSIS — M70.41 PREPATELLAR BURSITIS OF RIGHT KNEE: Primary | ICD-10-CM

## 2019-01-09 DIAGNOSIS — M25.461 SWOLLEN R KNEE: ICD-10-CM

## 2019-01-09 DIAGNOSIS — Z89.511 HISTORY OF RIGHT BELOW KNEE AMPUTATION (H): ICD-10-CM

## 2019-01-09 ASSESSMENT — MIFFLIN-ST. JEOR: SCORE: 1048.14

## 2019-01-09 ASSESSMENT — PAIN SCALES - GENERAL: PAINLEVEL: NO PAIN (0)

## 2019-01-09 NOTE — LETTER
"1/9/2019       RE: David Grissom  [address]  [facility] [address]     Dear Colleague,    Thank you for referring your patient, David Grissom, to the [facility] PHYSICAL MEDICINE AND REHABILITATION at [facility]. Please see a copy of my visit note below.    [facility] PM&R Clinic - New Patient Note   David Grissom  [id number]  Date of Evaluation: 1/9/2019  Referring Physician: Dr. Esqueda  Reason for consult: R knee swelling  HPI:  David \"Jovita\" Jaciel is a 53 year old woman who presents to the PM&R clinic today for evaluation of right knee swelling.  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  She has been wearing a prosthesis since then.  She was born with spina bifida and had surgery for \"floppy feet\", but was developing sores and infection spread to the bone which lead to the amputation.  She also had a revision in her 20s.  Her current prosthesis is 6-7 years old, but she has lost about 10 lbs over the past 1-2 years and the prosthesis is no longer fitting well.  She describes pistoning within the socket, as well as rotational movement.  She is in the process of obtaining a new socket, however the swelling in her knee would be ideally improved prior to fitting for a new socket.  Her prosthetist is Tono Duncan through [facility].  The swelling started in late summer/early fall of this year.  There were no known specific triggers or trauma but says she was \"in a job that was standing all the time\".   She wears prosthesis most of the day, but not at home.  She does not wear any socks.  Denies pain, only complains of the swelling.  She does not use ice or any anti-inflammatory medications, and she has never had the swelling drained.    Functionally she ambulates with Lofstrand crutches, but also has a wheelchair.  When she stands, her right leg bears much of her weight.  Independent with " ADLs.  She now works from home at a seated job position.     PMH:  Past Medical History:   Diagnosis Date     ASCUS with positive high risk HPV cervical 06/15/2017    type 16 & other HR HPV     Spina bifida (H)      PSH:  Past Surgical History:   Procedure Laterality Date     GYN SURGERY      Unilateral oopherectomy     ORTHOPEDIC SURGERY      Multiple surgeries on back, etc.      UROLOGY PROCEDURE                         DATE:      Urostomy     Allergies:  Allergies   Allergen Reactions     Ciprofloxacin Other (See Comments)     Seizures       Medications:  Current Outpatient Medications   Medication     Fish Oil OIL     Multiple Vitamin (DAILY MULTIVITAMIN PO)     ORDER FOR DME     ORDER FOR DME     vitamin   C (VITAMIN C) 250 MG tablet     No current facility-administered medications for this visit.        Social History:  Social History     Socioeconomic History     Marital status: Single     Spouse name: Not on file     Number of children: Not on file     Years of education: Not on file     Highest education level: Not on file   Social Needs     Financial resource strain: Not on file     Food insecurity - worry: Not on file     Food insecurity - inability: Not on file     Transportation needs - medical: Not on file     Transportation needs - non-medical: Not on file   Occupational History     Occupation: PCA     Employer: [employer]   Tobacco Use     Smoking status: Former Smoker     Types: Cigarettes     Last attempt to quit: 1987     Years since quittin.0     Smokeless tobacco: Never Used   Substance and Sexual Activity     Alcohol use: Yes     Alcohol/week: 0.0 - 0.6 oz     Comment: occasional     Drug use: No     Sexual activity: Yes     Partners: Male   Other Topics Concern     Parent/sibling w/ CABG, MI or angioplasty before 65F 55M? No   Social History Narrative     Not on file     Review of Systems - History obtained from chart review and the patient  General ROS:  "positive for  - weight loss, gradual  ENT ROS: negative for - visual changes  Respiratory ROS: no cough, shortness of breath, or wheezing  Cardiovascular ROS: no chest pain or dyspnea on exertion  Musculoskeletal ROS: positive for - R TTA, R knee swelling  Neurological ROS: positive for - spina bifida  Dermatological ROS: negative for rash  Functional Hx:  As per HPI  Physical Exam:  /62   Pulse 69   Ht 1.524 m (5')   Wt 52.2 kg (115 lb)   BMI 22.46 kg/m     Gen: NAD, pleasant and cooperative  Skin: No rashes noted  HEENT: NC/AT  Pulm: Non-labored breathing  GI: Soft, NT/ND  Neuro: AAOx3, follows commands, answers appropriately  R residual limb:  Conical shape.  ++Swelling over knee but does not appear to be intra-articular.  No TTP over the knee or distal limb.  Good amounts of soft tissue which is mobile.  FROM to KE and KF and MMT 5/5.  No erythema or skin breakdown.     Prosthesis: Lock in pin liner, PTB socket with knee brace.  Easily has 1 finger breadth between the limb and socket wall.  Residual limb sinking down upon standing.    Gait: Lofstrand crutches in both UEs, 3 point crutch walk     Labs:  Lab Results   Component Value Date    WBC 6.5 11/22/2013    RBC 4.36 11/22/2013    MCV 93 11/22/2013    MCH 31.4 11/22/2013    MCHC 33.8 11/22/2013    RDW 13.0 11/22/2013       Lab Results   Component Value Date    BUN 15 06/15/2017     06/15/2017    POTASSIUM 4.1 06/15/2017    CO2 26 06/15/2017    ANIONGAP 7 06/15/2017     Assessment:  David \"Jovita\" Jaciel is a 53 year old woman with a PMH of spina bifida and a R TTA at the age of 13 who presents to the PM&R clinic today for evaluation of right knee swelling.   Recommendations:  -Upon examination, it does not appear that her knee swelling is intra-articular, and I would agree that she likely has pre-patellar bursitis.  This is likely a direct result of her poor fitting prosthesis and increased standing for her previous job.  -Will obtain an " XR of the right knee and place referral to orthopedics for evaluation of drainage  -I would recommend not wearing her current prosthesis at all for now as this is likely exacerbating the issue.  However she finds it very difficult to manage, particularly leaving the house, unless she has the prosthesis on. Therefore I recommend wearing the prosthesis as LITTLE as possible.   -Have touched base with his prosthetist, Tono Duncan, and he is willing to see her to evaluate if there are any potential options temporarily to help.  He has already fabricated an AFO for the left ankle, and the hope is that this would allow her to bear more weight through the left side and relieve some pressure on the right.  She has yet to pick this up however.  We will ask Jovita to make an appointment to see him when possible.   -I would be happy to continue seeing Ms. Grissom for her prosthetic needs, but thus far has been managed by her PCP.  If her, or her PCP, would like me to continue managing her amputation then she may call to make another appointment any time.     Time Spent on this Encounter   I, Italo Hunt, spent a total of 45 minutes in the clinic managing the care of David Grissom.  Over 50% of my time was spent counseling the patient and /or coordinating care regarding etiology of swelling, and discussion with prosthetic team. See note for details.      Ned Hunt MD

## 2019-01-09 NOTE — PROGRESS NOTES
"[facility] PM&R Clinic - New Patient Note   David Grissom  [id number]  Date of Evaluation: 1/9/2019  Referring Physician: Dr. Esqueda  Reason for consult: R knee swelling  HPI:  David \"Jovita\" Jaciel is a 53 year old woman who presents to the PM&R clinic today for evaluation of right knee swelling.  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  She has been wearing a prosthesis since then.  She was born with spina bifida and had surgery for \"floppy feet\", but was developing sores and infection spread to the bone which lead to the amputation.  She also had a revision in her 20s.  Her current prosthesis is 6-7 years old, but she has lost about 10 lbs over the past 1-2 years and the prosthesis is no longer fitting well.  She describes pistoning within the socket, as well as rotational movement.  She is in the process of obtaining a new socket, however the swelling in her knee would be ideally improved prior to fitting for a new socket.  Her prosthetist is Tono Duncan through [place].  The swelling started in late summer/early fall of this year.  There were no known specific triggers or trauma but says she was \"in a job that was standing all the time\".   She wears prosthesis most of the day, but not at home.  She does not wear any socks.  Denies pain, only complains of the swelling.  She does not use ice or any anti-inflammatory medications, and she has never had the swelling drained.    Functionally she ambulates with Lofstrand crutches, but also has a wheelchair.  When she stands, her right leg bears much of her weight.  Independent with ADLs.  She now works from home at a seated job position.     PMH:  Past Medical History:   Diagnosis Date     ASCUS with positive high risk HPV cervical 06/15/2017    type 16 & other HR HPV     Spina bifida (H)      PSH:  Past Surgical History:   Procedure Laterality Date     GYN SURGERY  1999    Unilateral " oopherectomy     ORTHOPEDIC SURGERY      Multiple surgeries on back, etc.      UROLOGY PROCEDURE                         DATE:      Urostomy     Allergies:  Allergies   Allergen Reactions     Ciprofloxacin Other (See Comments)     Seizures       Medications:  Current Outpatient Medications   Medication     Fish Oil OIL     Multiple Vitamin (DAILY MULTIVITAMIN PO)     ORDER FOR DME     ORDER FOR DME     vitamin   C (VITAMIN C) 250 MG tablet     No current facility-administered medications for this visit.        Social History:  Social History     Socioeconomic History     Marital status: Single     Spouse name: Not on file     Number of children: Not on file     Years of education: Not on file     Highest education level: Not on file   Social Needs     Financial resource strain: Not on file     Food insecurity - worry: Not on file     Food insecurity - inability: Not on file     Transportation needs - medical: Not on file     Transportation needs - non-medical: Not on file   Occupational History     Occupation: PCA     Employer: [employer]   Tobacco Use     Smoking status: Former Smoker     Types: Cigarettes     Last attempt to quit: 1987     Years since quittin.0     Smokeless tobacco: Never Used   Substance and Sexual Activity     Alcohol use: Yes     Alcohol/week: 0.0 - 0.6 oz     Comment: occasional     Drug use: No     Sexual activity: Yes     Partners: Male   Other Topics Concern     Parent/sibling w/ CABG, MI or angioplasty before 65F 55M? No   Social History Narrative     Not on file     Review of Systems - History obtained from chart review and the patient  General ROS: positive for  - weight loss, gradual  ENT ROS: negative for - visual changes  Respiratory ROS: no cough, shortness of breath, or wheezing  Cardiovascular ROS: no chest pain or dyspnea on exertion  Musculoskeletal ROS: positive for - R TTA, R knee swelling  Neurological ROS: positive for - spina  "bifida  Dermatological ROS: negative for rash  Functional Hx:  As per HPI  Physical Exam:  /62   Pulse 69   Ht 1.524 m (5')   Wt 52.2 kg (115 lb)   BMI 22.46 kg/m    Gen: NAD, pleasant and cooperative  Skin: No rashes noted  HEENT: NC/AT  Pulm: Non-labored breathing  GI: Soft, NT/ND  Neuro: AAOx3, follows commands, answers appropriately  R residual limb:  Conical shape.  ++Swelling over knee but does not appear to be intra-articular.  No TTP over the knee or distal limb.  Good amounts of soft tissue which is mobile.  FROM to KE and KF and MMT 5/5.  No erythema or skin breakdown.     Prosthesis: Lock in pin liner, PTB socket with knee brace.  Easily has 1 finger breadth between the limb and socket wall.  Residual limb sinking down upon standing.    Gait: Lofstrand crutches in both UEs, 3 point crutch walk     Labs:  Lab Results   Component Value Date    WBC 6.5 11/22/2013    RBC 4.36 11/22/2013    MCV 93 11/22/2013    MCH 31.4 11/22/2013    MCHC 33.8 11/22/2013    RDW 13.0 11/22/2013       Lab Results   Component Value Date    BUN 15 06/15/2017     06/15/2017    POTASSIUM 4.1 06/15/2017    CO2 26 06/15/2017    ANIONGAP 7 06/15/2017     Assessment:  David \"Jovita\" Jaciel is a 53 year old woman with a PMH of spina bifida and a R TTA at the age of 13 who presents to the PM&R clinic today for evaluation of right knee swelling.   Recommendations:  -Upon examination, it does not appear that her knee swelling is intra-articular, and I would agree that she likely has pre-patellar bursitis.  This is likely a direct result of her poor fitting prosthesis and increased standing for her previous job.  -Will obtain an XR of the right knee and place referral to orthopedics for evaluation of drainage  -I would recommend not wearing her current prosthesis at all for now as this is likely exacerbating the issue.  However she finds it very difficult to manage, particularly leaving the house, unless she has the " prosthesis on. Therefore I recommend wearing the prosthesis as LITTLE as possible.   -Have touched base with his prosthetist, Tono Duncan, and he is willing to see her to evaluate if there are any potential options temporarily to help.  He has already fabricated an AFO for the left ankle, and the hope is that this would allow her to bear more weight through the left side and relieve some pressure on the right.  She has yet to pick this up however.  We will ask Jovita to make an appointment to see him when possible.   -I would be happy to continue seeing Ms. Grissom for her prosthetic needs, but thus far has been managed by her PCP.  If her, or her PCP, would like me to continue managing her amputation then she may call to make another appointment any time.   Italo Hunt MD  Department of Rehabilitation Medicine  Pager: [phone]  Time Spent on this Encounter   I, Italo Hunt, spent a total of 45 minutes in the clinic managing the care of David Grissom.  Over 50% of my time was spent counseling the patient and /or coordinating care regarding etiology of swelling, and discussion with prosthetic team. See note for details.

## 2019-01-14 NOTE — TELEPHONE ENCOUNTER
RECORDS RECEIVED FROM: internal   DATE RECEIVED: 1/14/19   NOTES STATUS DETAILS   OFFICE NOTE from referring provider Internal    OFFICE NOTE from other specialist Internal    DISCHARGE SUMMARY from hospital N/A    DISCHARGE REPORT from the ER N/A    OPERATIVE REPORT N/A    MEDICATION LIST N/A    IMPLANT RECORD/STICKER N/A    LABS     CBC/DIFF N/A    CULTURES N/A    INJECTIONS DONE IN RADIOLOGY n/a    MRI N/A    CT SCAN N/A    XRAYS (IMAGES & REPORTS) N/A    TUMOR     PATHOLOGY  Slides & report N/A    No recent imaging per patient

## 2019-01-15 ENCOUNTER — ANCILLARY PROCEDURE (OUTPATIENT)
Dept: GENERAL RADIOLOGY | Facility: CLINIC | Age: 54
End: 2019-01-15
Payer: MEDICARE

## 2019-01-15 ENCOUNTER — PRE VISIT (OUTPATIENT)
Dept: ORTHOPEDICS | Facility: CLINIC | Age: 54
End: 2019-01-15

## 2019-01-15 ENCOUNTER — OFFICE VISIT (OUTPATIENT)
Dept: ORTHOPEDICS | Facility: CLINIC | Age: 54
End: 2019-01-15
Payer: MEDICARE

## 2019-01-15 VITALS
SYSTOLIC BLOOD PRESSURE: 115 MMHG | HEIGHT: 60 IN | BODY MASS INDEX: 22.58 KG/M2 | DIASTOLIC BLOOD PRESSURE: 62 MMHG | WEIGHT: 115 LBS

## 2019-01-15 DIAGNOSIS — T87.9 BKA STUMP COMPLICATION (H): ICD-10-CM

## 2019-01-15 DIAGNOSIS — M25.461 SWOLLEN R KNEE: ICD-10-CM

## 2019-01-15 DIAGNOSIS — M70.41 PREPATELLAR BURSITIS OF RIGHT KNEE: Primary | ICD-10-CM

## 2019-01-15 LAB — RADIOLOGIST FLAGS: NORMAL

## 2019-01-15 ASSESSMENT — MIFFLIN-ST. JEOR: SCORE: 1048.14

## 2019-01-15 NOTE — LETTER
1/15/2019      RE: David Grissom  [address]  [facility] [address]       Sports Medicine Clinic Visit    PCP: Naima Saul    David Grissom is a 53 year old female who is seen in consultation at the request of Dr. Hunt presenting with right knee pain and pre-patellar swelling.     Injury: none    Location of Pain: right knee  Duration of Pain: 8 month(s)  Pain is better with: Ibuprofen  Pain is worse with: Wearing her prothesis, movement   Additional Features:   Treatment so far consists of: Ibuprofen  Prior History of related problems:     /62   Ht 1.524 m (5')   Wt 52.2 kg (115 lb)   BMI 22.46 kg/m            PMH:  Past Medical History:   Diagnosis Date     ASCUS with positive high risk HPV cervical 06/15/2017    type 16 & other HR HPV     Spina bifida (H)        Active problem list:  Patient Active Problem List   Diagnosis     CARDIOVASCULAR SCREENING; LDL GOAL LESS THAN 130     Spina bifida (H)     Seizure disorder (H)     History of recurrent UTIs     Thyroid nodule     Nontoxic multinodular goiter     History of leg amputation (H)     Attention to urostomy (H)     ASCUS with positive high risk HPV cervical     Chronic bilateral low back pain without sciatica       FH:  Family History   Problem Relation Age of Onset     Diabetes Mother      Hypertension Mother      Heart Disease Maternal Grandmother      Heart Disease Maternal Grandfather        SH:  Social History     Socioeconomic History     Marital status: Single     Spouse name: Not on file     Number of children: Not on file     Years of education: Not on file     Highest education level: Not on file   Social Needs     Financial resource strain: Not on file     Food insecurity - worry: Not on file     Food insecurity - inability: Not on file     Transportation needs - medical: Not on file     Transportation needs - non-medical: Not on file   Occupational History     Occupation: PCA     Employer: [employer]   Tobacco Use     Smoking status: Former Smoker     Types: Cigarettes     Last attempt to quit: 1987     Years since quittin.0     Smokeless tobacco: Never Used   Substance and Sexual Activity     Alcohol use: Yes     Alcohol/week: 0.0 - 0.6 oz     Comment: occasional     Drug use: No     Sexual activity: Yes     Partners: Male   Other Topics Concern     Parent/sibling w/ CABG, MI or angioplasty before 65F 55M? No   Social History Narrative     Not on file       MEDS:  See EMR, reviewed  ALL:  See EMR, reviewed    REVIEW OF SYSTEMS:  CONSTITUTIONAL:NEGATIVE for fever, chills, change in weight  INTEGUMENTARY/SKIN: NEGATIVE for worrisome rashes, moles or lesions  EYES: NEGATIVE for vision changes or irritation  ENT/MOUTH: NEGATIVE for ear, mouth and throat problems  RESP:NEGATIVE for significant cough or SOB  BREAST: NEGATIVE for masses, tenderness or discharge  CV: NEGATIVE for chest pain, palpitations or peripheral edema  GI: NEGATIVE for nausea, abdominal pain, heartburn, or change in bowel habits  :NEGATIVE for frequency, dysuria, or hematuria  :NEGATIVE for frequency, dysuria, or hematuria  NEURO: NEGATIVE for weakness, dizziness or paresthesias  ENDOCRINE: NEGATIVE for temperature intolerance, skin/hair changes  HEME/ALLERGY/IMMUNE: NEGATIVE for bleeding problems  PSYCHIATRIC: NEGATIVE for changes in mood or affect      Subjective: This 53-year-old female with a history of a right-sided below the knee amputation with a prosthesis since age 14 is in the midst of having a new prosthesis made.  Prior to this the prosthesis was ill fitting and she was pivoting within the prosthesis.  She is ended up with a prepatellar swelling that is been present for the last 6-8 months.    Objective: She has a patulous prepatellar swelling that is nontender and is not warm to the touch I can flex and extend the knee joint without discomfort through full range of motion and there is no signs of cellulitis or  signs of infected bursitis.  There is no skin breakdown currently.  Appropriate in conversation and affect.    Assessment: Prepatellar bursitis    Plan: I related to her my concerns about the possibility of causing a septic bursitis by trying to drain this.  She knows that often this will be drained only to reaccumulate and that there is also the chance of fistula formation.  She has had issues with cellulitis in the past associated with this amputation.  I told her my hope would be that if she reevaluates this extremity with her prosthetist and they come up with a snug fitting orthotic that works well that she would have slowly disappearing amounts of bursitis over the next 9 months.  And she would avoid the risk of septic bursitis.  If it is absolutely impossible for them to create a prosthesis with this bursal swelling she knows there are opportunities to have a drained but she would be assuming these risks, possible risks that would make it difficult for her to wear a prosthesis at all.  That she plans on discussing with her prosthetist.                  Young Husain MD

## 2019-01-15 NOTE — PROGRESS NOTES
Sports Medicine Clinic Visit    PCP: Naima Saulsana Grissom is a 53 year old female who is seen in consultation at the request of Dr. Hunt presenting with right knee pain and pre-patellar swelling.     Injury: none    Location of Pain: right knee  Duration of Pain: 8 month(s)  Pain is better with: Ibuprofen  Pain is worse with: Wearing her prothesis, movement   Additional Features:   Treatment so far consists of: Ibuprofen  Prior History of related problems:     /62   Ht 1.524 m (5')   Wt 52.2 kg (115 lb)   BMI 22.46 kg/m           PMH:  Past Medical History:   Diagnosis Date     ASCUS with positive high risk HPV cervical 06/15/2017    type 16 & other HR HPV     Spina bifida (H)        Active problem list:  Patient Active Problem List   Diagnosis     CARDIOVASCULAR SCREENING; LDL GOAL LESS THAN 130     Spina bifida (H)     Seizure disorder (H)     History of recurrent UTIs     Thyroid nodule     Nontoxic multinodular goiter     History of leg amputation (H)     Attention to urostomy (H)     ASCUS with positive high risk HPV cervical     Chronic bilateral low back pain without sciatica       FH:  Family History   Problem Relation Age of Onset     Diabetes Mother      Hypertension Mother      Heart Disease Maternal Grandmother      Heart Disease Maternal Grandfather        SH:  Social History     Socioeconomic History     Marital status: Single     Spouse name: Not on file     Number of children: Not on file     Years of education: Not on file     Highest education level: Not on file   Social Needs     Financial resource strain: Not on file     Food insecurity - worry: Not on file     Food insecurity - inability: Not on file     Transportation needs - medical: Not on file     Transportation needs - non-medical: Not on file   Occupational History     Occupation: PCA     Employer: [employer]   Tobacco Use     Smoking status: Former Smoker     Types: Cigarettes     Last attempt to  quit: 1987     Years since quittin.0     Smokeless tobacco: Never Used   Substance and Sexual Activity     Alcohol use: Yes     Alcohol/week: 0.0 - 0.6 oz     Comment: occasional     Drug use: No     Sexual activity: Yes     Partners: Male   Other Topics Concern     Parent/sibling w/ CABG, MI or angioplasty before 65F 55M? No   Social History Narrative     Not on file       MEDS:  See EMR, reviewed  ALL:  See EMR, reviewed    REVIEW OF SYSTEMS:  CONSTITUTIONAL:NEGATIVE for fever, chills, change in weight  INTEGUMENTARY/SKIN: NEGATIVE for worrisome rashes, moles or lesions  EYES: NEGATIVE for vision changes or irritation  ENT/MOUTH: NEGATIVE for ear, mouth and throat problems  RESP:NEGATIVE for significant cough or SOB  BREAST: NEGATIVE for masses, tenderness or discharge  CV: NEGATIVE for chest pain, palpitations or peripheral edema  GI: NEGATIVE for nausea, abdominal pain, heartburn, or change in bowel habits  :NEGATIVE for frequency, dysuria, or hematuria  :NEGATIVE for frequency, dysuria, or hematuria  NEURO: NEGATIVE for weakness, dizziness or paresthesias  ENDOCRINE: NEGATIVE for temperature intolerance, skin/hair changes  HEME/ALLERGY/IMMUNE: NEGATIVE for bleeding problems  PSYCHIATRIC: NEGATIVE for changes in mood or affect      Subjective: This 53-year-old female with a history of a right-sided below the knee amputation with a prosthesis since age 14 is in the midst of having a new prosthesis made.  Prior to this the prosthesis was ill fitting and she was pivoting within the prosthesis.  She is ended up with a prepatellar swelling that is been present for the last 6-8 months.    Objective: She has a patulous prepatellar swelling that is nontender and is not warm to the touch I can flex and extend the knee joint without discomfort through full range of motion and there is no signs of cellulitis or signs of infected bursitis.  There is no skin breakdown currently.  Appropriate in conversation and  affect.    Assessment: Prepatellar bursitis    Plan: I related to her my concerns about the possibility of causing a septic bursitis by trying to drain this.  She knows that often this will be drained only to reaccumulate and that there is also the chance of fistula formation.  She has had issues with cellulitis in the past associated with this amputation.  I told her my hope would be that if she reevaluates this extremity with her prosthetist and they come up with a snug fitting orthotic that works well that she would have slowly disappearing amounts of bursitis over the next 9 months.  And she would avoid the risk of septic bursitis.  If it is absolutely impossible for them to create a prosthesis with this bursal swelling she knows there are opportunities to have a drained but she would be assuming these risks, possible risks that would make it difficult for her to wear a prosthesis at all.  That she plans on discussing with her prosthetist.

## 2019-05-07 ENCOUNTER — OFFICE VISIT (OUTPATIENT)
Dept: FAMILY MEDICINE | Facility: CLINIC | Age: 54
End: 2019-05-07
Payer: MEDICARE

## 2019-05-07 VITALS
BODY MASS INDEX: 23.14 KG/M2 | WEIGHT: 118.5 LBS | SYSTOLIC BLOOD PRESSURE: 141 MMHG | HEART RATE: 86 BPM | TEMPERATURE: 99.1 F | DIASTOLIC BLOOD PRESSURE: 80 MMHG

## 2019-05-07 DIAGNOSIS — L03.317 CELLULITIS OF BUTTOCK: Primary | ICD-10-CM

## 2019-05-07 PROCEDURE — 99213 OFFICE O/P EST LOW 20 MIN: CPT | Performed by: PHYSICIAN ASSISTANT

## 2019-05-07 RX ORDER — CEFDINIR 300 MG/1
300 CAPSULE ORAL 2 TIMES DAILY
Qty: 20 CAPSULE | Refills: 0 | Status: SHIPPED | OUTPATIENT
Start: 2019-05-07 | End: 2019-05-07

## 2019-05-07 RX ORDER — CEFDINIR 300 MG/1
300 CAPSULE ORAL 2 TIMES DAILY
Qty: 20 CAPSULE | Refills: 0 | Status: SHIPPED | OUTPATIENT
Start: 2019-05-07 | End: 2023-09-11

## 2019-05-07 NOTE — PROGRESS NOTES
"  SUBJECTIVE:   David Grissom is a 53 year old female who presents to clinic today for the following   health issues:      Here today for a possible infection on her left butt cheek. She does crawl at home and slides on her butt. She knows it is open for a few months and she states that she started feeling bad yesterday. Used Triple A&D ointment and doesn think that it is helping.     Denies fevers or chills. No other symptoms. \"Just feeling very tired.\"     Patient Active Problem List   Diagnosis     CARDIOVASCULAR SCREENING; LDL GOAL LESS THAN 130     Spina bifida (H)     Seizure disorder (H)     History of recurrent UTIs     Thyroid nodule     Nontoxic multinodular goiter     History of leg amputation (H)     Attention to urostomy (H)     ASCUS with positive high risk HPV cervical     Chronic bilateral low back pain without sciatica      Current Outpatient Medications   Medication     cefdinir (OMNICEF) 300 MG capsule     Fish Oil OIL     Multiple Vitamin (DAILY MULTIVITAMIN PO)     ORDER FOR DME     ORDER FOR DME     vitamin   C (VITAMIN C) 250 MG tablet     No current facility-administered medications for this visit.           Additional history: as documented    Reviewed  and updated as needed this visit by clinical staff  Tobacco  Allergies  Meds  Med Hx  Surg Hx  Fam Hx  Soc Hx        Reviewed and updated as needed this visit by Provider         Labs reviewed in EPIC    ROS:  Constitutional, HEENT, cardiovascular, pulmonary, gi and gu systems are negative, except as otherwise noted.    OBJECTIVE:     /80   Pulse 86   Temp 99.1  F (37.3  C) (Oral)   Wt 53.8 kg (118 lb 8 oz)   LMP 05/04/2019 (Approximate)   BMI 23.14 kg/m    Body mass index is 23.14 kg/m .  GENERAL: healthy, alert and no distress  SKIN: Left buttock she has a 5 cm area of induration, hyperthermia, a few superficial abrasions are noted, no fluctuance or other focal areas of infection       ASSESSMENT/PLAN:       ICD-10-CM  " "  1. Cellulitis of buttock L03.317 cefdinir (OMNICEF) 300 MG capsule     DISCONTINUED: cefdinir (OMNICEF) 300 MG capsule      Reviewed - her skin exam shows a small cellulitis. Will treat. She says she's \"feeling unwell\" - her exam and history are otherwise not revealing - will start a broad spectrum antibiotic - want her to see PCP in a few days, early next week to recheck considering how lousy she says she feels.     This prescription is given with a discussion of side effects, risks and proper use.  Instructions are given to follow up if not improving or symptoms change or worsen as discussed.     TATUM Fernandez, PA-C   "

## 2019-05-09 ENCOUNTER — OFFICE VISIT (OUTPATIENT)
Dept: FAMILY MEDICINE | Facility: CLINIC | Age: 54
End: 2019-05-09
Payer: MEDICARE

## 2019-05-09 VITALS
SYSTOLIC BLOOD PRESSURE: 130 MMHG | TEMPERATURE: 98.4 F | DIASTOLIC BLOOD PRESSURE: 82 MMHG | BODY MASS INDEX: 22.89 KG/M2 | HEART RATE: 70 BPM | WEIGHT: 117.2 LBS

## 2019-05-09 DIAGNOSIS — Z43.6 ATTENTION TO UROSTOMY (H): ICD-10-CM

## 2019-05-09 DIAGNOSIS — R50.9 FEVER, UNSPECIFIED FEVER CAUSE: Primary | ICD-10-CM

## 2019-05-09 DIAGNOSIS — Q05.9 SPINA BIFIDA WITHOUT HYDROCEPHALUS, UNSPECIFIED SPINAL REGION (H): ICD-10-CM

## 2019-05-09 DIAGNOSIS — N30.00 ACUTE CYSTITIS WITHOUT HEMATURIA: ICD-10-CM

## 2019-05-09 DIAGNOSIS — Z87.440 HISTORY OF RECURRENT UTIS: ICD-10-CM

## 2019-05-09 LAB
ALBUMIN UR-MCNC: ABNORMAL MG/DL
APPEARANCE UR: CLEAR
BACTERIA #/AREA URNS HPF: ABNORMAL /HPF
BILIRUB UR QL STRIP: NEGATIVE
COLOR UR AUTO: YELLOW
GLUCOSE UR STRIP-MCNC: NEGATIVE MG/DL
HGB UR QL STRIP: ABNORMAL
KETONES UR STRIP-MCNC: 80 MG/DL
LEUKOCYTE ESTERASE UR QL STRIP: ABNORMAL
MUCOUS THREADS #/AREA URNS LPF: PRESENT /LPF
NITRATE UR QL: NEGATIVE
PH UR STRIP: 6.5 PH (ref 5–7)
RBC #/AREA URNS AUTO: ABNORMAL /HPF
SOURCE: ABNORMAL
SP GR UR STRIP: 1.01 (ref 1–1.03)
UROBILINOGEN UR STRIP-ACNC: 0.2 EU/DL (ref 0.2–1)
WBC #/AREA URNS AUTO: ABNORMAL /HPF

## 2019-05-09 PROCEDURE — 99214 OFFICE O/P EST MOD 30 MIN: CPT | Performed by: FAMILY MEDICINE

## 2019-05-09 PROCEDURE — 81001 URINALYSIS AUTO W/SCOPE: CPT | Performed by: FAMILY MEDICINE

## 2019-05-09 NOTE — PROGRESS NOTES
SUBJECTIVE:   David Grissom is a 53 year old female who presents to clinic today for the following   health issues:        Acute Illness   Acute illness concerns: fatigue, pressure in face  Onset: 3 days ago    Fever: YES- 99    Chills/Sweats: YES    Headache (location?): YES- yesterday    Sinus Pressure:YES    Conjunctivitis:  no    Ear Pain: no    Rhinorrhea: no    Congestion: no    Sore Throat: no     Cough: chest congestion    Wheeze: no    Decreased Appetite: YES    Nausea: YES    Vomiting: no    Diarrhea:  no    Dysuria/Freq.: no    Fatigue/Achiness: YES, fatigue    Sick/Strep Exposure: no      Therapies Tried and outcome: none    She has a pressure sore on her buttocks and was put on omnicef for this.  She has been having trouble with eating and drinking the last few days.  She denies any pain.  She does have a urinary bag.  She denies cough but has mild sob.  She feels like she has a pressure in her face.  She ws having nausea earlier this week.  This is improving now.  She is on omnicef started 5/7/19 for 10 days.         Additional history: as documented    Reviewed  and updated as needed this visit by clinical staff  Tobacco  Allergies  Meds  Med Hx  Surg Hx  Fam Hx  Soc Hx        Reviewed and updated as needed this visit by Provider         BP Readings from Last 3 Encounters:   05/09/19 130/82   05/07/19 141/80   01/15/19 115/62    Wt Readings from Last 3 Encounters:   05/09/19 53.2 kg (117 lb 3.2 oz)   05/07/19 53.8 kg (118 lb 8 oz)   01/15/19 52.2 kg (115 lb)                    ROS:  Constitutional, HEENT, cardiovascular, pulmonary, GI, , musculoskeletal, neuro, skin, endocrine and psych systems are negative, except as otherwise noted.    OBJECTIVE:     /82 (BP Location: Right arm, Patient Position: Sitting, Cuff Size: Adult Regular)   Pulse 70   Temp 98.4  F (36.9  C) (Oral)   Wt 53.2 kg (117 lb 3.2 oz)   LMP 05/04/2019 (Approximate)   BMI 22.89 kg/m    Body mass index is  22.89 kg/m .  GENERAL: healthy, alert and no distress  HENT: ear canals and TM's normal, nose and mouth without ulcers or lesions  NECK: no adenopathy, no asymmetry, masses, or scars and thyroid normal to palpation  RESP: lungs clear to auscultation - no rales, rhonchi or wheezes  CV: regular rate and rhythm, normal S1 S2, no S3 or S4, no murmur, click or rub, no peripheral edema and peripheral pulses strong  ABDOMEN: soft, nontender, no hepatosplenomegaly, no masses and bowel sounds normal  SKIN: pressure sore x 2 on left buttocks breakdown limited to skin with a white eschar.   PSYCH: mentation appears normal, affect normal/bright    Diagnostic Test Results:  No results found for this or any previous visit (from the past 24 hour(s)).    ASSESSMENT/PLAN:     ASSESSMENT/PLAN:      ICD-10-CM    1. Fever, unspecified fever cause R50.9 UA reflex to Microscopic     Urine Microscopic   2. Acute cystitis without hematuria N30.00    3. Spina bifida without hydrocephalus, unspecified spinal region (H) Q05.9    4. Attention to urostomy (H) Z43.6    5. History of recurrent UTIs Z87.440      Finish antibiotics and return for wound care here.  She declines seeing wound care.  omnicef is also covering uti as well as wound.     Patient Instructions   Wash wound with soap and water everyday. Apply wet dressing to wound. Let it dry. Do this once a day.            Naima Saul, DO  [facility]

## 2019-05-09 NOTE — PATIENT INSTRUCTIONS
Wash wound with soap and water everyday. Apply wet dressing to wound. Let it dry. Do this once a day.

## 2019-05-13 ENCOUNTER — ALLIED HEALTH/NURSE VISIT (OUTPATIENT)
Dept: NURSING | Facility: CLINIC | Age: 54
End: 2019-05-13
Payer: MEDICARE

## 2019-05-13 DIAGNOSIS — Z51.89 ENCOUNTER FOR WOUND CARE: Primary | ICD-10-CM

## 2019-05-13 PROCEDURE — 99207 ZZC NO CHARGE NURSE ONLY: CPT

## 2019-05-13 NOTE — PROGRESS NOTES
Patient presents for wound check and dressing change.  Patient had two pressure ulcers to skin over area near L ischial tuberosity. Both are between 0.5-0.7cm in diameter.  Distal wound is covered with new tissue, healing well, and proximal wound is healing well, still slightly open.  Scant serous drainage with no odor. Surrounding skin is natural with no breakdown or maceration.      Patient is changing dressing daily at home, has been doing wet to dry. She reports compliance with antibiotics. Wounds cleansed with NS and new dressing placed. Patient sent home with remainder of NS, some non-adherent gauze pads and tape. Education provided regarding avoiding pressure and friction, repositioning frequently with understanding voiced.    Will route update to PCP for recommendation.      Holly Gilliam RN

## 2019-08-27 ENCOUNTER — TELEPHONE (OUTPATIENT)
Dept: FAMILY MEDICINE | Facility: CLINIC | Age: 54
End: 2019-08-27

## 2019-08-27 NOTE — TELEPHONE ENCOUNTER
Forms received from [facility] Order / Physical Therapy - evaluate and treat as clinically indicated for Naima Caimal DO.  Forms placed in provider 'sign me' folder.  Please fax forms to [phone] after completion.    Consuelo Garnica  Patient Representative

## 2019-09-29 ENCOUNTER — HEALTH MAINTENANCE LETTER (OUTPATIENT)
Age: 54
End: 2019-09-29

## 2019-11-29 ENCOUNTER — TELEPHONE (OUTPATIENT)
Dept: FAMILY MEDICINE | Facility: CLINIC | Age: 54
End: 2019-11-29

## 2019-11-29 NOTE — TELEPHONE ENCOUNTER
Forms received from [facility]: detailed order for pouch urine w/ hinojosa for Naima Saul DO.  Forms placed in provider 'sign me' folder.  Please mail forms to [facility] in provided envelope after completion.    Bassam Perez

## 2020-02-28 ENCOUNTER — HOSPITAL ENCOUNTER (OUTPATIENT)
Dept: PHYSICAL THERAPY | Facility: CLINIC | Age: 55
Setting detail: THERAPIES SERIES
End: 2020-02-28
Attending: FAMILY MEDICINE
Payer: MEDICARE

## 2020-02-28 PROCEDURE — 97161 PT EVAL LOW COMPLEX 20 MIN: CPT | Mod: GP | Performed by: PHYSICAL THERAPIST

## 2020-02-28 NOTE — PROGRESS NOTES
02/28/20 0900   General Information   Start of Care Date 02/28/20   Referring Physician Naima Saul   Orders Evaluate and Treat as Indicated   Order Date 08/27/19   Medical Diagnosis R BKA   Precautions/Limitations no known precautions/limitations   Special Instructions had R BKA since age 14.  old one was 8 yr old.; it got too big. lost weight.  it did not fit.     Surgical/Medical history reviewed Yes   Pertinent history of current problem (include personal factors and/or comorbidities that impact the POC) I saw Tono last thurs.  I use ride service. this one is not done.  i need a change of foot.  socket is done.  uses forearm crutches.  born w/ spina bifida.  last 4 to 5 yrs i used forearm crutches/ or longer.  don't like under arm crutches.   states can speed up/ slow down .  lives in apt w/ elevator.  live w/ dtr.  does own cooking, bathing, cooks.  takes a bath.       Pertinent Visual History  good w/ glasses   Prior level of functional mobility Ambulation   Ambulation use crutches and - uses/  has w/c. 20 yr manual.     Prior level of function comment depends on distance   Current Community Support Family/friend caregiver   Patient role/Employment history Employed  ([employer])   Living environment Apartment/condo   Home/Community Accessibility Comments elevator   Current Assistive Devices ForeArm Crutches;Manual Wheel Chair   Patient/Family Goals Statement get prosthesis finished up.    General Information Comments DOUGLAS.   ht 5'. wt 117lb.    Fall Risk Screen   Fall screen completed by PT   Have you fallen 2 or more times in the past year? Yes   Have you fallen and had an injury in the past year? No   Is patient a fall risk? No   Fall screen comments got up by self, no injury.  fell on ice (only)   Abuse Screen (yes response referral indicated)   Feels Unsafe at Home or Work/School no   Feels Threatened by Someone no   Pain   Patient currently in pain Yes   Pain location R hip due to improper fit.    Pain  description comment wants to get PT for hip closer to home.     Vitals Signs   Heart Rate 72   Blood Pressure 127/63   Cognitive Status Examination   Orientation orientation to person, place and time   Level of Consciousness alert   Follows Commands and Answers Questions 100% of the time   Personal Safety and Judgment intact   Memory intact   Integumentary   Integumentary No deficits were identified   Posture   Posture Forward head position   Range of Motion (ROM)   ROM Comment wfls   Strength   Strength Comments gr 0 L ankle.  does not like afo's.     Bed Mobility   Bed Mobility Comments indep   Transfer Skills   Transfer Comments pt states can get up off floor indep w/ furniture.    Locomotion   Wheel Chair Mobility Comments has manual w/c   Gait Special Tests   Gait Special Tests 25 FOOT TIMED WALK;DYNAMIC GAIT INDEX   Gait Special Tests 25 Foot Timed Walk   Seconds 11.6   Comments crutches, step through pattern w/ both legs at same time.  pt reports this is how she has learned best to walk and she wants to keep this pattern.   Gait Special Tests Dynamic Gait Index   Score out of 24 13   Comments  w/ forearm crutches.    Balance   Balance Comments standing for 2 to 3 sec w/out support behind her.   Sensory Examination   Sensory Perception Comments wfls   Muscle Tone   Muscle Tone Comments L foot hypotonic   Planned Therapy Interventions   Planned Therapy Interventions Comment n/a at this time   Clinical Impression   Criteria for Skilled Therapeutic Interventions Met evaluation only   PT Diagnosis gait difficulty w/ prosthesis and AD   Influenced by the following impairments weakness, amputation   Functional limitations due to impairments gait req assistive device /gait diffiiculty   Clinical Presentation Stable/Uncomplicated   Clinical Decision Making (Complexity) Low complexity   Therapy Frequency other (see comments)   Predicted Duration of Therapy Intervention (days/wks) eval only today - explained role of PT  - if need PT after gets full prosthesis will call   Risk & Benefits of therapy have been explained Yes   Patient, Family & other staff in agreement with plan of care Yes   Clinical Impression Comments 55 yo female w/ longstanding R BKA and very experienced user.   NEEDS NEW prosthetic  as prior one unusable and cannot be repaired.  Having a new prosthesis will allow her to continue to work, do self care and ambulate.    Education Assessment   Preferred Learning Style Listening   Barriers to Learning Physical   GOALS   PT Eval Goals 1   Goal 1   Goal Identifier PT role   Goal Description pt to understand PT role and return as needed for gait training w/ new prosthesis   Target Date 05/27/20   Date Met 02/28/20   Total Evaluation Time   PT Eval, Low Complexity Minutes ([id number]) 48

## 2020-02-28 NOTE — PROGRESS NOTES
Prosthesis letter of medical necessity    Date: 20     Patient's Last Name:   Jaciel   First Name:  David  Diagnosis:  R BKA  :  65  Height:  5 feet 0 inches  Weight:  117lb      History of Amputation  See PT feliz dated  20; pt had amputation at age 14.      Functional Limitations  See PT feliz dated 20.  55 yo female w/ longstanding R BKA and very experienced user.   NEEDS NEW prosthetic  as prior one unusable and cannot be repaired.  Having a new prosthesis will allow her to continue to work, do self care and ambulate.  She uses 2 lofstrand / forearm crutches to ambulate and will continue to need these w/ new prosthesis.       Functional Capabilities  See PT feliz dated 20.       Functional level 3: The patient has the ability or potential for ambulation with variable sunil. Typical of the community ambulator who has the ability to traverse most environmental barriers and may have vocational, therapeutic, or exercise activity that demands prosthetic utilization beyond simple locomotion.      Condition of Limb  No skin issues.  No breakdown.  Is using trial / new socket.  Old socket un-usable.   Pt reports having had a weight loss in past year.       Condition of Current Prosthesis  Too big, unusable.      Past Experience with Prosthesis  She has been a user for 40 years.  Has had to use a prosthesis all of her adult life.  The prosthesis just prior to this is too large, not usable and pt had a weight loss.       Patient's Motivation to Use New Prosthesis  Very high as she wants to continue her own self care and work life.       Electronically signed by:  [email or web address]  [phone]  Pager [phone]                                I CERTIFY THE NEED FOR THESE SERVICES FURNISHED UNDER THIS PLAN OF TREATMENT AND WHILE UNDER MY CARE     (Physician co-signature of this document indicates review and certification of the therapy plan).                            Co-signing physician: Naima Saul

## 2020-03-15 ENCOUNTER — HEALTH MAINTENANCE LETTER (OUTPATIENT)
Age: 55
End: 2020-03-15

## 2020-03-25 ENCOUNTER — TELEPHONE (OUTPATIENT)
Dept: FAMILY MEDICINE | Facility: CLINIC | Age: 55
End: 2020-03-25

## 2020-03-25 NOTE — TELEPHONE ENCOUNTER
Have not received form. Called and requested that they re-fax, verified our fax number.    Bassam Perez

## 2020-03-25 NOTE — TELEPHONE ENCOUNTER
Reason for Call:  Other     Detailed comments: Stated that she had faxed forms several weeks ago and has not received. Please contact if needs to refax.     Phone Number    [facility] [phone]   Ext 2      Best Time:     Can we leave a detailed message on this number? YES    Call taken on 3/25/2020 at 11:56 AM by Corrina Raygoza

## 2020-04-02 NOTE — TELEPHONE ENCOUNTER
Forms received from [facility]/ Confirmation of Order - amputation supplies  for Naima Caires DO.  Forms placed in provider 'sign me' folder.  Please fax forms to [phone] after completion.    Consuelo Garnica  Patient Representative

## 2021-01-14 ENCOUNTER — HEALTH MAINTENANCE LETTER (OUTPATIENT)
Age: 56
End: 2021-01-14

## 2021-05-09 ENCOUNTER — HEALTH MAINTENANCE LETTER (OUTPATIENT)
Age: 56
End: 2021-05-09

## 2021-05-29 ENCOUNTER — RECORDS - HEALTHEAST (OUTPATIENT)
Dept: ADMINISTRATIVE | Facility: CLINIC | Age: 56
End: 2021-05-29

## 2021-05-30 ENCOUNTER — RECORDS - HEALTHEAST (OUTPATIENT)
Dept: ADMINISTRATIVE | Facility: CLINIC | Age: 56
End: 2021-05-30

## 2021-09-01 ENCOUNTER — TELEPHONE (OUTPATIENT)
Dept: FAMILY MEDICINE | Facility: CLINIC | Age: 56
End: 2021-09-01

## 2021-09-01 NOTE — TELEPHONE ENCOUNTER
Forms received from [facility]/ Detailed Order - Pouch Urine w/Ruby (date: 8/27/21) for Naima Saul DO.  Forms placed in provider 'sign me' folder.  Please mail forms to [facility] using attached envelope after completion.    Perico Lee RT (R) ([facility])  Radiology Dept

## 2021-09-03 ENCOUNTER — MEDICAL CORRESPONDENCE (OUTPATIENT)
Dept: HEALTH INFORMATION MANAGEMENT | Facility: CLINIC | Age: 56
End: 2021-09-03

## 2021-10-23 ENCOUNTER — HEALTH MAINTENANCE LETTER (OUTPATIENT)
Age: 56
End: 2021-10-23

## 2022-03-10 ENCOUNTER — OFFICE VISIT (OUTPATIENT)
Dept: PHYSICAL MEDICINE AND REHAB | Facility: CLINIC | Age: 57
End: 2022-03-10
Payer: MEDICARE

## 2022-03-10 DIAGNOSIS — R29.898 WEAKNESS OF RIGHT HIP: ICD-10-CM

## 2022-03-10 DIAGNOSIS — Z89.511 RIGHT BELOW-KNEE AMPUTEE (H): Primary | ICD-10-CM

## 2022-03-10 DIAGNOSIS — R26.2 IMPAIRED AMBULATION: ICD-10-CM

## 2022-03-10 DIAGNOSIS — Q05.9 SPINA BIFIDA WITHOUT HYDROCEPHALUS, UNSPECIFIED SPINAL REGION (H): ICD-10-CM

## 2022-03-10 PROCEDURE — 99204 OFFICE O/P NEW MOD 45 MIN: CPT | Performed by: PHYSICAL MEDICINE & REHABILITATION

## 2022-03-10 NOTE — PROGRESS NOTES
"[facility] - Follow Up Patient Note   David Grissom  [id number]  Date of Evaluation: 3/10/22  Date of Last PM&R Clinic Evaluation: 1/9/2019  Recall:  David \"Jovita\" Jaciel is a 56 year old woman with a PMH of spina bifida and a R TTA at the age of 13 due to sores and infection of the leg.     Interval History:  David Grissom is seen in the PM&R clinic today for her amputation needs.  At her prior visit with me 3 years ago she was seen for pre-patellar bursitis which was managed conservatively.  She received a new prosthesis at that time as it was felt it was contributing to her symptoms, and fortunately the bursitis did resolve but she was somewhat lost to follow up from a prosthesis standpoint.  She reports things were going well, however the socket has not been fitting well for the past several months.  As a result, she is not wearing the prosthetic much at home, and is harder to don.  She is wearing it 1-2 days per week, 3-4 hours at a time, but previously wearing it 5 days/wk when she was working at her jobsite.  She has gained some weight although she is not sure exactly how much.  When not wearing the prosthetic she uses a WC or crawls for mobility at home, but when leaving the house she uses the prosthetic to ambulate with forearm crutches.  Some adjustments have just been made to the socket which thus far has made it easier to don, however still feels tight proximally with pressure but no pain.  Denies any areas of skin breakdown, ulcerations, or redness.  She is independent with ADLs.        Medications:  Current Outpatient Medications   Medication     cefdinir (OMNICEF) 300 MG capsule     Fish Oil OIL     Multiple Vitamin (DAILY MULTIVITAMIN PO)     ORDER FOR DME     ORDER FOR DME     vitamin   C (VITAMIN C) 250 MG tablet     No current facility-administered medications for this visit.       Review of Systems  Negative for chest pain, shortness of breath  Negative for " "fevers, chills  Negative for residual limb pain  +Poor fitting prosthesis    Functional Hx:  As per HPI  Physical Exam:  There were no vitals taken for this visit.  Gen: NAD, pleasant and cooperative  Skin: No rashes noted  HEENT: NC/AT  Pulm: Non-labored breathing  GI: Soft, NT/ND  Neuro: AAOx3, follows commands, answers appropriately  R residual limb:  Cylindrical shape.   No TTP over the knee or distal limb.  Good amounts of soft tissue which is mobile.  FROM to KE and KF and MMT 5/5.  Hip adduction 5/5, hip abduction 2/5.  No erythema or skin breakdown.     Prosthesis: Lock in pin liner, PTB socket with significant grinding down with knee brace and thigh corset.  Significant grinding down of the inner walls noted.       Assessment:  David \"Jovita\" Jaciel is a 56 year old woman with a PMH of spina bifida and a R TTA at the age of 13 who presents to the PM&R clinic today for evaluation of right knee swelling.     Recommendations:  -Arminda's socket has just been modified by shaving down the inner walls to increase the space as it is too tight.  We will see how she feels with these modifications, but if she continues to have difficulty with fit and donning the socket, there would be no further modifications that I would recommend and we would need to manufacture a completely new socket  -Have encouraged her to wear a  when not wearing the prosthesis to decrease limb size  -Will place orders for new liners  -Order for PT for strengthening, in particular hip abductors.  She will do thi closer to her home in [place]  -I discussed with the patient that I will be leaving this practice in a few months, but we will try and get her set up to see one of my colleagues.  She would benefit from follow up in 3 months to see how the socket modifications are going.      Italo Hunt MD  Department of Rehabilitation Medicine    Time Spent on this Encounter   I, Italo Hunt, spent a total of 50 minutes in the clinic " managing the care of David Grissom.  Over 50% of my time was spent counseling the patient and /or coordinating care regarding etiology of swelling, and discussion with prosthetic team. See note for details.

## 2022-03-10 NOTE — LETTER
"3/10/2022       RE: David Grissom  [address]  [facility] [address]     Dear Colleague,    Thank you for referring your patient, David Grissom, to the [facility]IC[facility]HABILITATION CLINIC [place] [facility]. Please see a copy of my visit note below.    [facility] - Follow Up Patient Note   David Grissom  [id number]  Date of Evaluation: 3/10/22  Date of Last PM&R Clinic Evaluation: 1/9/2019  Recall:  David \"Jovita\" Jaciel is a 56 year old woman with a PMH of spina bifida and a R TTA at the age of 13 due to sores and infection of the leg.     Interval History:  David Grissom is seen in the PM&R clinic today for her amputation needs.  At her prior visit with me 3 years ago she was seen for pre-patellar bursitis which was managed conservatively.  She received a new prosthesis at that time as it was felt it was contributing to her symptoms, and fortunately the bursitis did resolve but she was somewhat lost to follow up from a prosthesis standpoint.  She reports things were going well, however the socket has not been fitting well for the past several months.  As a result, she is not wearing the prosthetic much at home, and is harder to don.  She is wearing it 1-2 days per week, 3-4 hours at a time, but previously wearing it 5 days/wk when she was working at her jobsite.  She has gained some weight although she is not sure exactly how much.  When not wearing the prosthetic she uses a WC or crawls for mobility at home, but when leaving the house she uses the prosthetic to ambulate with forearm crutches.  Some adjustments have just been made to the socket which thus far has made it easier to don, however still feels tight proximally with pressure but no pain.  Denies any areas of skin breakdown, ulcerations, or redness.  She is independent with ADLs.        Medications:  Current Outpatient " "Medications   Medication     cefdinir (OMNICEF) 300 MG capsule     Fish Oil OIL     Multiple Vitamin (DAILY MULTIVITAMIN PO)     ORDER FOR DME     ORDER FOR DME     vitamin   C (VITAMIN C) 250 MG tablet     No current facility-administered medications for this visit.       Review of Systems  Negative for chest pain, shortness of breath  Negative for fevers, chills  Negative for residual limb pain  +Poor fitting prosthesis    Functional Hx:  As per HPI  Physical Exam:  There were no vitals taken for this visit.  Gen: NAD, pleasant and cooperative  Skin: No rashes noted  HEENT: NC/AT  Pulm: Non-labored breathing  GI: Soft, NT/ND  Neuro: AAOx3, follows commands, answers appropriately  R residual limb:  Cylindrical shape.   No TTP over the knee or distal limb.  Good amounts of soft tissue which is mobile.  FROM to KE and KF and MMT 5/5.  Hip adduction 5/5, hip abduction 2/5.  No erythema or skin breakdown.     Prosthesis: Lock in pin liner, PTB socket with significant grinding down with knee brace and thigh corset.  Significant grinding down of the inner walls noted.       Assessment:  David \"Jovita\" Jaciel is a 56 year old woman with a PMH of spina bifida and a R TTA at the age of 13 who presents to the PM&R clinic today for evaluation of right knee swelling.     Recommendations:  -Arminda's socket has just been modified by shaving down the inner walls to increase the space as it is too tight.  We will see how she feels with these modifications, but if she continues to have difficulty with fit and donning the socket, there would be no further modifications that I would recommend and we would need to manufacture a completely new socket  -Have encouraged her to wear a  when not wearing the prosthesis to decrease limb size  -Will place orders for new liners  -Order for PT for strengthening, in particular hip abductors.  She will do thi closer to her home in [place]  -I discussed with the patient that I will be " leaving this practice in a few months, but we will try and get her set up to see one of my colleagues.  She would benefit from follow up in 3 months to see how the socket modifications are going.      Italo Hunt MD  Department of Rehabilitation Medicine    Time Spent on this Encounter   I, Italo Hunt, spent a total of 50 minutes in the clinic managing the care of David Grissom.  Over 50% of my time was spent counseling the patient and /or coordinating care regarding etiology of swelling, and discussion with prosthetic team. See note for details.

## 2022-06-04 ENCOUNTER — HEALTH MAINTENANCE LETTER (OUTPATIENT)
Age: 57
End: 2022-06-04

## 2022-10-10 ENCOUNTER — HEALTH MAINTENANCE LETTER (OUTPATIENT)
Age: 57
End: 2022-10-10

## 2023-03-25 ENCOUNTER — HEALTH MAINTENANCE LETTER (OUTPATIENT)
Age: 58
End: 2023-03-25

## 2023-06-11 ENCOUNTER — HEALTH MAINTENANCE LETTER (OUTPATIENT)
Age: 58
End: 2023-06-11

## 2023-09-11 ENCOUNTER — OFFICE VISIT (OUTPATIENT)
Dept: FAMILY MEDICINE | Facility: CLINIC | Age: 58
End: 2023-09-11
Payer: COMMERCIAL

## 2023-09-11 VITALS
TEMPERATURE: 98.2 F | SYSTOLIC BLOOD PRESSURE: 119 MMHG | WEIGHT: 110 LBS | HEART RATE: 74 BPM | BODY MASS INDEX: 21.48 KG/M2 | OXYGEN SATURATION: 98 % | DIASTOLIC BLOOD PRESSURE: 63 MMHG | RESPIRATION RATE: 18 BRPM

## 2023-09-11 DIAGNOSIS — G40.909 SEIZURE DISORDER (H): Primary | ICD-10-CM

## 2023-09-11 PROCEDURE — 99203 OFFICE O/P NEW LOW 30 MIN: CPT | Performed by: FAMILY MEDICINE

## 2023-09-11 RX ORDER — CARBAMAZEPINE 100 MG/1
100 TABLET, EXTENDED RELEASE ORAL DAILY
COMMUNITY
Start: 2022-02-24 | End: 2024-04-03 | Stop reason: ALTCHOICE

## 2023-09-11 NOTE — PROGRESS NOTES
Assessment & Plan   Problem List Items Addressed This Visit          Nervous and Auditory    Seizure disorder (H) - Primary    Relevant Medications    carBAMazepine (TEGRETOL XR) 100 MG 12 hr tablet    Other Relevant Orders    Adult Neurology  Referral      Continue same dose tegretol, establish with neurology in our system as her insurance network dictates crossover    DO JUAN SMITH [facility] AYDEE Hilliard is a 57 year old, presenting for the following health issues:  Referral        9/11/2023     7:18 AM   Additional Questions   Roomed by Elda FULLER CMA         9/11/2023     7:19 AM   Patient Reported Additional Medications   Patient reports taking the following new medications Tegnelol 100mg, Cephalexin 500mg, Metroprolol 25mg       History of Present Illness       Reason for visit:  Seizure medication    She eats 2-3 servings of fruits and vegetables daily.She consumes 1 sweetened beverage(s) daily.She exercises with enough effort to increase her heart rate 20 to 29 minutes per day.  She exercises with enough effort to increase her heart rate 4 days per week. She is missing 7 dose(s) of medications per week.  She is not taking prescribed medications regularly due to side effects.               Review of Systems         Objective    /63 (BP Location: Left arm, Patient Position: Chair, Cuff Size: Adult Small)   Pulse 74   Temp 98.2  F (36.8  C) (Oral)   Resp 18   Wt 49.9 kg (110 lb)   LMP 05/04/2019 (Approximate)   SpO2 98%   BMI 21.48 kg/m    Body mass index is 21.48 kg/m .  Physical Exam  Constitutional:       General: She is not in acute distress.  Neurological:      Mental Status: She is alert and oriented to person, place, and time.      Comments: Ambulates with crutches

## 2023-10-02 ASSESSMENT — ENCOUNTER SYMPTOMS
HEADACHES: 0
FEVER: 0
PALPITATIONS: 0
ARTHRALGIAS: 1
COUGH: 0
MYALGIAS: 1
SHORTNESS OF BREATH: 0
NERVOUS/ANXIOUS: 0
HEMATOCHEZIA: 0
DIZZINESS: 0
SORE THROAT: 1
JOINT SWELLING: 0
DIARRHEA: 0
ABDOMINAL PAIN: 0
NAUSEA: 0
BREAST MASS: 0
CHILLS: 0
FREQUENCY: 0
PARESTHESIAS: 0
CONSTIPATION: 0
HEARTBURN: 1
HEMATURIA: 0
DYSURIA: 0
EYE PAIN: 0
WEAKNESS: 0

## 2023-10-02 ASSESSMENT — ACTIVITIES OF DAILY LIVING (ADL): CURRENT_FUNCTION: NO ASSISTANCE NEEDED

## 2023-10-02 NOTE — COMMUNITY RESOURCES LIST (ENGLISH)
10/02/2023   [facility]  N/A  For questions about this resource list or additional care needs, please contact your primary care clinic or care manager.  Phone: [phone]   Email: N/A   Address: [facility] [place] [address]   Hours: N/A        Transportation       Free or low-cost transportation  1  [facility] Distance: 7.6 miles      In-Person   [address] [place] [address]  Language: English  Hours: Mon - Wed 9:30 AM - 12:00 PM , Mon - Wed 1:00 PM - 2:00 PM Appt. Only  Fees: Free   Phone: (287) 251-8388 Email: info@saintolaf.org Website: http://www.saintolaf.org/     2  [facility] Distance: 7.82 miles      In-Person   [address] [place] [address]  Language: English  Hours: Tue 9:30 AM - 11:30 AM , Thu 9:30 AM - 11:30 AM  Fees: Free   Phone: (475) 266-6075 Email: info@[employer] Website: http://www.iiyuma/     Transportation to medical appointments  3  [facility]   Family Wellness (AIFW) Distance: 2.62 miles      In-Person   [address] [place] [address]  Language: Bengali, Danish, English, Gujarati, Charley, Faroese, Kiswahili, Romansh, Indonesian, Divehi  Hours: Mon - Wed 9:00 AM - 5:00 PM , Thu 12:00 PM - 6:00 PM , Fri 9:00 AM - 5:00 PM , Sun 10:30 AM - 2:00 PM Appt. Only  Fees: Free   Phone: (964) 546-2518 Email: info@sewa-aifw.org Website: https://www.sewa-aifw.org/     4  [facility] Distance: 6.93 miles      In-Person   [address] [address]  Language: English  Hours: Mon - Sun Open 24 Hours  Fees: Insurance, Self Pay   Phone: (936) 745-6642 Email: meme@[employer] Website: https://www.[employer]/midmetro/          Important Numbers & Websites       Emergency Services   911  [facility] Services   311  Poison Control   (435) 387-7315  Suicide Prevention Lifeline   (842) 354-6204 (TALK)  Child Abuse Hotline   (325)  991-1594 (4-A-Child)  Sexual Assault Hotline   (997) 949-8204 (HOPE)  National Runaway Safeline   (731) 818-6816 (RUNAWAY)  All-Options Talkline   (981) 595-4908  Substance Abuse Referral   (780) 991-2214 (HELP)

## 2023-10-05 ENCOUNTER — PATIENT OUTREACH (OUTPATIENT)
Dept: ONCOLOGY | Facility: CLINIC | Age: 58
End: 2023-10-05

## 2023-10-05 ENCOUNTER — OFFICE VISIT (OUTPATIENT)
Dept: INTERNAL MEDICINE | Facility: CLINIC | Age: 58
End: 2023-10-05
Payer: COMMERCIAL

## 2023-10-05 VITALS
OXYGEN SATURATION: 98 % | SYSTOLIC BLOOD PRESSURE: 127 MMHG | HEART RATE: 60 BPM | HEIGHT: 60 IN | WEIGHT: 108 LBS | DIASTOLIC BLOOD PRESSURE: 76 MMHG | BODY MASS INDEX: 21.2 KG/M2 | TEMPERATURE: 98.3 F

## 2023-10-05 DIAGNOSIS — Z23 NEED FOR PROPHYLACTIC VACCINATION AGAINST HEPATITIS B VIRUS: ICD-10-CM

## 2023-10-05 DIAGNOSIS — Z11.4 SCREENING FOR HIV (HUMAN IMMUNODEFICIENCY VIRUS): Primary | ICD-10-CM

## 2023-10-05 DIAGNOSIS — Z00.00 ENCOUNTER FOR MEDICARE ANNUAL WELLNESS EXAM: ICD-10-CM

## 2023-10-05 DIAGNOSIS — Z12.4 CERVICAL CANCER SCREENING: ICD-10-CM

## 2023-10-05 DIAGNOSIS — M25.511 CHRONIC PAIN OF BOTH SHOULDERS: ICD-10-CM

## 2023-10-05 DIAGNOSIS — K21.00 GASTROESOPHAGEAL REFLUX DISEASE WITH ESOPHAGITIS WITHOUT HEMORRHAGE: Primary | ICD-10-CM

## 2023-10-05 DIAGNOSIS — G89.29 CHRONIC PAIN OF BOTH SHOULDERS: ICD-10-CM

## 2023-10-05 DIAGNOSIS — M25.512 CHRONIC PAIN OF BOTH SHOULDERS: ICD-10-CM

## 2023-10-05 DIAGNOSIS — Z23 NEED FOR SHINGLES VACCINE: ICD-10-CM

## 2023-10-05 DIAGNOSIS — K21.00 GASTROESOPHAGEAL REFLUX DISEASE WITH ESOPHAGITIS WITHOUT HEMORRHAGE: ICD-10-CM

## 2023-10-05 DIAGNOSIS — Z93.2 ILEOSTOMY STATUS (H): ICD-10-CM

## 2023-10-05 DIAGNOSIS — Z23 NEED FOR TDAP VACCINATION: ICD-10-CM

## 2023-10-05 DIAGNOSIS — S88.119A BELOW-KNEE AMPUTATION (H): ICD-10-CM

## 2023-10-05 LAB
ALBUMIN SERPL BCG-MCNC: 4 G/DL (ref 3.5–5.2)
ALP SERPL-CCNC: 71 U/L (ref 35–104)
ALT SERPL W P-5'-P-CCNC: 16 U/L (ref 0–50)
ANION GAP SERPL CALCULATED.3IONS-SCNC: 10 MMOL/L (ref 7–15)
AST SERPL W P-5'-P-CCNC: 30 U/L (ref 0–45)
BASO+EOS+MONOS # BLD AUTO: NORMAL 10*3/UL
BASO+EOS+MONOS NFR BLD AUTO: NORMAL %
BASOPHILS # BLD AUTO: 0.1 10E3/UL (ref 0–0.2)
BASOPHILS NFR BLD AUTO: 1 %
BILIRUB SERPL-MCNC: 0.4 MG/DL
BUN SERPL-MCNC: 13.3 MG/DL (ref 6–20)
CALCIUM SERPL-MCNC: 9.5 MG/DL (ref 8.6–10)
CHLORIDE SERPL-SCNC: 106 MMOL/L (ref 98–107)
CHOLEST SERPL-MCNC: 178 MG/DL
CREAT SERPL-MCNC: 0.95 MG/DL (ref 0.51–0.95)
DEPRECATED HCO3 PLAS-SCNC: 25 MMOL/L (ref 22–29)
EGFRCR SERPLBLD CKD-EPI 2021: 70 ML/MIN/1.73M2
EOSINOPHIL # BLD AUTO: 0.7 10E3/UL (ref 0–0.7)
EOSINOPHIL NFR BLD AUTO: 11 %
ERYTHROCYTE [DISTWIDTH] IN BLOOD BY AUTOMATED COUNT: 13 % (ref 10–15)
GLUCOSE SERPL-MCNC: 78 MG/DL (ref 70–99)
HCT VFR BLD AUTO: 38.2 % (ref 35–47)
HDLC SERPL-MCNC: 69 MG/DL
HGB BLD-MCNC: 12.6 G/DL (ref 11.7–15.7)
HIV 1+2 AB+HIV1 P24 AG SERPL QL IA: NONREACTIVE
IMM GRANULOCYTES # BLD: 0 10E3/UL
IMM GRANULOCYTES NFR BLD: 0 %
LDLC SERPL CALC-MCNC: 97 MG/DL
LYMPHOCYTES # BLD AUTO: 2.1 10E3/UL (ref 0.8–5.3)
LYMPHOCYTES NFR BLD AUTO: 37 %
MCH RBC QN AUTO: 30.9 PG (ref 26.5–33)
MCHC RBC AUTO-ENTMCNC: 33 G/DL (ref 31.5–36.5)
MCV RBC AUTO: 94 FL (ref 78–100)
MONOCYTES # BLD AUTO: 0.3 10E3/UL (ref 0–1.3)
MONOCYTES NFR BLD AUTO: 6 %
NEUTROPHILS # BLD AUTO: 2.6 10E3/UL (ref 1.6–8.3)
NEUTROPHILS NFR BLD AUTO: 45 %
NONHDLC SERPL-MCNC: 109 MG/DL
PLATELET # BLD AUTO: 322 10E3/UL (ref 150–450)
POTASSIUM SERPL-SCNC: 3.9 MMOL/L (ref 3.4–5.3)
PROT SERPL-MCNC: 6.5 G/DL (ref 6.4–8.3)
RBC # BLD AUTO: 4.08 10E6/UL (ref 3.8–5.2)
SODIUM SERPL-SCNC: 141 MMOL/L (ref 135–145)
TRIGL SERPL-MCNC: 58 MG/DL
WBC # BLD AUTO: 5.7 10E3/UL (ref 4–11)

## 2023-10-05 PROCEDURE — 80053 COMPREHEN METABOLIC PANEL: CPT

## 2023-10-05 PROCEDURE — 80061 LIPID PANEL: CPT

## 2023-10-05 PROCEDURE — 85025 COMPLETE CBC W/AUTO DIFF WBC: CPT

## 2023-10-05 PROCEDURE — 36415 COLL VENOUS BLD VENIPUNCTURE: CPT

## 2023-10-05 PROCEDURE — G0145 SCR C/V CYTO,THINLAYER,RESCR: HCPCS

## 2023-10-05 PROCEDURE — 87389 HIV-1 AG W/HIV-1&-2 AB AG IA: CPT

## 2023-10-05 PROCEDURE — 87624 HPV HI-RISK TYP POOLED RSLT: CPT

## 2023-10-05 PROCEDURE — 99396 PREV VISIT EST AGE 40-64: CPT

## 2023-10-05 ASSESSMENT — ENCOUNTER SYMPTOMS
ABDOMINAL PAIN: 0
NAUSEA: 0
DYSURIA: 0
SHORTNESS OF BREATH: 0
PARESTHESIAS: 0
SORE THROAT: 1
HEADACHES: 0
FREQUENCY: 0
FEVER: 0
EYE PAIN: 0
HEMATURIA: 0
HEMATOCHEZIA: 0
NERVOUS/ANXIOUS: 0
JOINT SWELLING: 0
WEAKNESS: 0
DIZZINESS: 0
CHILLS: 0
PALPITATIONS: 0
CONSTIPATION: 0
MYALGIAS: 1
BREAST MASS: 0
DIARRHEA: 0
COUGH: 0
ARTHRALGIAS: 1
HEARTBURN: 1

## 2023-10-05 ASSESSMENT — ACTIVITIES OF DAILY LIVING (ADL): CURRENT_FUNCTION: NO ASSISTANCE NEEDED

## 2023-10-05 NOTE — PROGRESS NOTES
"   SUBJECTIVE:   CC: Arminda is an 57 year old who presents for preventive health visit.       10/5/2023     9:04 AM   Additional Questions   Roomed by Tasha CORTES       Healthy Habits:     In general, how would you rate your overall health?  Good    Frequency of exercise:  2-3 days/week    Duration of exercise:  30-45 minutes    Do you usually eat at least 4 servings of fruit and vegetables a day, include whole grains    & fiber and avoid regularly eating high fat or \"junk\" foods?  Yes    Taking medications regularly:  Yes    Ability to successfully perform activities of daily living:  No assistance needed    Home Safety:  Lack of grab bars in the bathroom    Hearing Impairment:  No hearing concerns    In the past 6 months, have you been bothered by leaking of urine?  No    In general, how would you rate your overall mental or emotional health?  Good    Additional concerns today:  No      Today's PHQ-2 Score:       10/5/2023     9:02 AM   PHQ-2 (  [employer])   Q1: Little interest or pleasure in doing things 0   Q2: Feeling down, depressed or hopeless 0   PHQ-2 Score 0   Q1: Little interest or pleasure in doing things Not at all   Q2: Feeling down, depressed or hopeless Not at all   PHQ-2 Score 0           {Add if <65 person on Medicare  - Required Questions (Optional):510875}  {Outside tests to abstract? :293606}    {additional problems to add (Optional):256702}  Have you ever done Advance Care Planning? (For example, a Health Directive, POLST, or a discussion with a medical provider or your loved ones about your wishes): { :336769}    Social History     Tobacco Use     Smoking status: Former     Types: Cigarettes     Quit date: 1987     Years since quittin.7     Smokeless tobacco: Never   Substance Use Topics     Alcohol use: Yes     Alcohol/week: 0.0 - 1.0 standard drinks of alcohol     Comment: occasional     {Rooming staff  Click this link to complete the Prescreen if response below is not for today's " visit  Alcohol Use Prescreen >3 drinks/day or > 7 drinks/week.  If the prescreen question answer is YES, complete the full AUDIT  :615119}        10/2/2023     2:28 PM   Alcohol Use   Prescreen: >3 drinks/day or >7 drinks/week? No   {add AUDIT responses (Optional) (A score of 7 for adult men is an indication of hazardous drinking; a score of 8 or more is an indication of an alcohol use disorder.  A score of 7 or more for adult women is an indication of hazardous drinking or an alchohol use disorder):857828}  Reviewed orders with patient.  Reviewed health maintenance and updated orders accordingly - { :066634}  {Chronicprobdata (optional):328189}    Breast Cancer Screening:    FHS-7:       10/2/2023     2:30 PM   Breast CA Risk Assessment (FHS-7)   Did any of your relatives have bilateral breast cancer? Yes   Did any man in your family have breast cancer? No   Did any woman in your family have breast and ovarian cancer? Yes   Did any woman in your family have breast cancer before age 50 y? No   Do you have 2 or more relatives with breast and/or ovarian cancer? No   Do you have 2 or more relatives with breast and/or bowel cancer? No     {If any of the questions to the BCRA (FHS-7) are answered yes, consider ordering referral for genetic counseling (Optional) :496031}  {AMB Mammogram Decision Support (Optional) :130255}  Pertinent mammograms are reviewed under the imaging tab.    History of abnormal Pap smear: { :472871}      Latest Ref Rng & Units 9/27/2018    11:29 AM 9/27/2018    11:23 AM 6/15/2017    10:35 AM   PAP / HPV   PAP (Historical)  NIL      HPV 16 DNA NEG^Negative  Negative  Positive    HPV 18 DNA NEG^Negative  Negative  Negative    Other HR HPV NEG^Negative  Negative  Positive      Reviewed and updated as needed this visit by clinical staff                  Reviewed and updated as needed this visit by Provider                 {HISTORY OPTIONS (Optional):701046}    Review of Systems   Constitutional:   "Negative for chills and fever.   HENT:  Positive for sore throat. Negative for congestion, ear pain and hearing loss.    Eyes:  Negative for pain and visual disturbance.   Respiratory:  Negative for cough and shortness of breath.    Cardiovascular:  Negative for chest pain, palpitations and peripheral edema.   Gastrointestinal:  Positive for heartburn. Negative for abdominal pain, constipation, diarrhea, hematochezia and nausea.   Breasts:  Negative for tenderness, breast mass and discharge.   Genitourinary:  Negative for dysuria, frequency, genital sores, hematuria, pelvic pain, urgency, vaginal bleeding and vaginal discharge.   Musculoskeletal:  Positive for arthralgias and myalgias. Negative for joint swelling.   Skin:  Negative for rash.   Neurological:  Negative for dizziness, weakness, headaches and paresthesias.   Psychiatric/Behavioral:  Negative for mood changes. The patient is not nervous/anxious.      {FEMALE ROS (Optional):882046}     OBJECTIVE:   LMP 05/04/2019 (Approximate)   Physical Exam  {Exam Choices (Optional):801407}    {Diagnostic Test Results (Optional):974962}    ASSESSMENT/PLAN:   {Diag Picklist:417344}    {Patient advised of split billing (Optional):034947}      COUNSELING:  {FEMALE COUNSELING MESSAGES:348604::\"Reviewed preventive health counseling, as reflected in patient instructions\"}        She reports that she quit smoking about 36 years ago. Her smoking use included cigarettes. She has never used smokeless tobacco.      {Counseling Resources  [place] Preventive Services Task Force  Cholesterol Screening  Health diet/nutrition  Pooled Cohorts Equation Calculator  USDA's MyPlate  ASA Prophylaxis  Lung CA Screening  Osteoporosis prevention/bone health :291362}  {Breast Cancer Risk Calculator  BRCA-Related Cancer Risk Assessment FHS-7 Tool :875197}  [facility]  "

## 2023-10-05 NOTE — COMMUNITY RESOURCES LIST (ENGLISH)
10/05/2023   [facility] Outpatient Clinics  N/A  For additional resource needs, please contact your health insurance member services or your primary care team.  Phone: [phone]   Email: N/A   Address: [facility] [place] [address]   Hours: N/A        Transportation       Free or low-cost transportation  1  [facility] Distance: 7.6 miles      In-Person   [address] [place] [address]  Language: English  Hours: Mon - Wed 9:30 AM - 12:00 PM , Mon - Wed 1:00 PM - 2:00 PM Appt. Only  Fees: Free   Phone: (940) 392-2080 Email: info@saintolaf.org Website: http://www.saintolaf.org/     2  [facility] Distance: 7.82 miles      In-Person   [address] [place] [address]  Language: English  Hours: Tue 9:30 AM - 11:30 AM , Thu 9:30 AM - 11:30 AM  Fees: Free   Phone: (131) 379-8247 Email: info@[employer] Website: http://www.[employer]/     Transportation to medical appointments  3  [facility]  [place] [facility] (AIFW) Distance: 2.62 miles      In-Person   [address] [place] [address]  Language: Setswana, Yakut, English, Gujarati, Charley, Thai, Belarusian, Frisian, Setswana, English  Hours: Mon - Wed 9:00 AM - 5:00 PM , Thu 12:00 PM - 6:00 PM , Fri 9:00 AM - 5:00 PM , Sun 10:30 AM - 2:00 PM Appt. Only  Fees: Free   Phone: (867) 798-6953 Email: info@sewa-aifw.org Website: https://www.sewa-aifw.org/     4  [facility] Distance: 6.93 miles      In-Person   [address] [place] [address]  Language: English  Hours: Mon - Sun Open 24 Hours  Fees: Insurance, Self Pay   Phone: (676) 440-7732 Email: meme@[employer] Website: https://www.[employer]/midmetro/          Important Numbers & Websites       [facility]   [address] [facility].org  Poison Control   (604) 967-9702 [facility] Hospital[facility].org  Suicide and Crisis Lifeline   [address] Healthline.org  [facility] Child [facility]  Hotline   529.361.2226 Childhelphotline.org  National Sexual Assault Hotline   (970) 521-2535 (HOPE) Rainn.org  National Runaway Safeline   (628) 397-5021 (RUNAWAY) [facility] Network.org  Pregnancy & Postpartum Support [place]   Call/text 090-815-1116 Ppsupportmn.org  Substance Abuse National Helpline ([facility]   101-659-HELP (7588) Findtreatment.gov  Emergency Services   912

## 2023-10-05 NOTE — COMMUNITY RESOURCES LIST (ENGLISH)
10/05/2023   [facility]  [facility]  For questions about this resource list or additional care needs, please contact your primary care clinic or care manager.  Phone: [phone]   Email: N/A   Address: [facility] [place] [address]   Hours: N/A        Transportation       Free or low-cost transportation  1  [facility] Distance: 7.6 miles      In-Person   [address] [place] [address]  Language: English  Hours: Mon - Wed 9:30 AM - 12:00 PM , Mon - Wed 1:00 PM - 2:00 PM Appt. Only  Fees: Free   Phone: (739) 139-7202 Email: info@saintolaf.org Website: http://www.saintolaf.org/     2  [facility] Distance: 7.82 miles      In-Person   [address] [place] [address]  Language: English  Hours: Tue 9:30 AM - 11:30 AM , Thu 9:30 AM - 11:30 AM  Fees: Free   Phone: (556) 108-3737 Email: info@[employer] Website: http://www.[employer]/     Transportation to medical appointments  3  [facility]   Family Wellness (AIFW) Distance: 2.62 miles      In-Person   [address] [place] [address]  Language: Tamazight, Hungarian, English, Gujarati, Charley, Serbian, Armenian, Maltese, Upper sorbian, Maori  Hours: Mon - Wed 9:00 AM - 5:00 PM , Thu 12:00 PM - 6:00 PM , Fri 9:00 AM - 5:00 PM , Sun 10:30 AM - 2:00 PM Appt. Only  Fees: Free   Phone: (541) 556-8107 Email: info@sewa-aifw.org Website: https://www.sewa-aifw.org/     4  [facility] Distance: 6.93 miles      In-Person   [address] [address]  Language: English  Hours: Mon - Sun Open 24 Hours  Fees: Insurance, Self Pay   Phone: (706) 293-1098 Email: meme@[employer] Website: https://www.[employer]/midmetro/          Important Numbers & Websites       Emergency Services   911  [facility] Services   311  Poison Control   (400) 842-8033  Suicide Prevention Lifeline   (522) 615-8276 (TALK)  Child Abuse Hotline   (315)  150-3151 (4-A-Child)  Sexual Assault Hotline   (282) 485-1955 (HOPE)  National Runaway Safeline   (700) 553-9530 (RUNAWAY)  All-Options Talkline   (766) 649-6201  Substance Abuse Referral   (959) 605-3343 (HELP)

## 2023-10-05 NOTE — PROGRESS NOTES
"SUBJECTIVE:   Arminda is a 57 year old who presents for Preventive Visit.      10/5/2023     9:04 AM   Additional Questions   Roomed by Tasha CORTES       Are you in the first 12 months of your Medicare coverage?  No    Healthy Habits:     In general, how would you rate your overall health?  Good    Frequency of exercise:  2-3 days/week    Duration of exercise:  30-45 minutes    Do you usually eat at least 4 servings of fruit and vegetables a day, include whole grains    & fiber and avoid regularly eating high fat or \"junk\" foods?  Yes    Taking medications regularly:  Yes    Ability to successfully perform activities of daily living:  No assistance needed    Home Safety:  Lack of grab bars in the bathroom    Hearing Impairment:  No hearing concerns    In the past 6 months, have you been bothered by leaking of urine?  No    In general, how would you rate your overall mental or emotional health?  Good    Additional concerns today:  No      Today's PHQ-2 Score:       10/5/2023     9:24 AM   PHQ-2 ( 1999 Pfizer)   Q1: Little interest or pleasure in doing things 0   Q2: Feeling down, depressed or hopeless 0   PHQ-2 Score 0           Have you ever done Advance Care Planning? (For example, a Health Directive, POLST, or a discussion with a medical provider or your loved ones about your wishes): No, advance care planning information given to patient to review.  {Advance Care Planning No Options:949285}       Fall risk  Fallen 2 or more times in the past year?: Yes  Any fall with injury in the past year?: No    Cognitive Screening   1) Repeat 3 items (Leader, Season, Table)    2) Clock draw: NORMAL  3) 3 item recall: Recalls 3 objects  Results: 3 items recalled: COGNITIVE IMPAIRMENT LESS LIKELY    Mini-CogTM Copyright [employer]. Licensed by the author for use in [facility] [employer]; reprinted with permission ([email or web address]@.[email or web address]). All rights reserved.      Do you have sleep apnea, excessive snoring or daytime drowsiness? : daytime " drowsiness    Reviewed and updated as needed this visit by clinical staff   Tobacco  Allergies  Meds              Reviewed and updated as needed this visit by Provider                 Social History     Tobacco Use     Smoking status: Former     Types: Cigarettes     Quit date: 1987     Years since quittin.7     Smokeless tobacco: Never   Substance Use Topics     Alcohol use: Yes     Alcohol/week: 0.0 - 1.0 standard drinks of alcohol     Comment: occasional     {Rooming staff  Click this link to complete the Prescreen if response below is not for today's visit  Alcohol Use Prescreen >3 drinks/day or > 7 drinks/week.  If the prescreen question answer is YES, complete the full AUDIT  :003857}        10/2/2023     2:28 PM   Alcohol Use   Prescreen: >3 drinks/day or >7 drinks/week? No     Do you have a current opioid prescription? No  Do you use any other controlled substances or medications that are not prescribed by a provider? None  {Provider  If there are gaps in the social history shown above, please follow the link and refresh the note Link to Social and Substance History :955979}        Discuss hiatal hernia, Bilateral shoulder pain     Current providers sharing in care for this patient include:   Patient Care Team:  Naima Saul DO as PCP - General (Family Practice)  Ned Hunt MD as Referring Physician (Physical Medicine and Rehabilitation)  Young Husain MD as MD (Family Practice)  Arden Hampton DO as Assigned PCP    The following health maintenance items are reviewed in Epic and correct as of today:  Health Maintenance   Topic Date Due     ANNUAL REVIEW OF HM ORDERS  Never done     ADVANCE CARE PLANNING  Never done     HEPATITIS B IMMUNIZATION (1 of 3 - 3-dose series) Never done     HIV SCREENING  Never done     DTAP/TDAP/TD IMMUNIZATION (1 - Tdap) 1992     ZOSTER IMMUNIZATION (1 of 2) Never done     MEDICARE ANNUAL WELLNESS VISIT  2019     LIPID  2021      HPV TEST  08/28/2022     PAP  08/28/2022     INFLUENZA VACCINE (1) Never done     COVID-19 Vaccine (3 - 2023-24 season) 09/01/2023     MAMMO SCREENING  12/01/2024     COLORECTAL CANCER SCREENING  06/01/2026     HEPATITIS C SCREENING  Completed     PHQ-2 (once per calendar year)  Completed     Pneumococcal Vaccine: Pediatrics (0 to 5 Years) and At-Risk Patients (6 to 64 Years)  Aged Out     IPV IMMUNIZATION  Aged Out     HPV IMMUNIZATION  Aged Out     MENINGITIS IMMUNIZATION  Aged Out     {Chronicprobdata (optional):400981}  {Decision Support (Optional):772275}    FHS-7:       10/2/2023     2:30 PM   Breast CA Risk Assessment (FHS-7)   Did any of your relatives have bilateral breast cancer? Yes   Did any man in your family have breast cancer? No   Did any woman in your family have breast and ovarian cancer? Yes   Did any woman in your family have breast cancer before age 50 y? No   Do you have 2 or more relatives with breast and/or ovarian cancer? No   Do you have 2 or more relatives with breast and/or bowel cancer? No     {If any of the questions to the BCRA (FHS-7) are answered yes, consider ordering referral for genetic counseling (Optional) :442505}  {AMB Mammogram Decision Support (Optional) :440258}  Pertinent mammograms are reviewed under the imaging tab.    Review of Systems   Constitutional:  Negative for chills and fever.   HENT:  Positive for sore throat. Negative for congestion, ear pain and hearing loss.    Eyes:  Negative for pain and visual disturbance.   Respiratory:  Negative for cough and shortness of breath.    Cardiovascular:  Negative for chest pain, palpitations and peripheral edema.   Gastrointestinal:  Positive for heartburn. Negative for abdominal pain, constipation, diarrhea, hematochezia and nausea.   Breasts:  Negative for tenderness, breast mass and discharge.   Genitourinary:  Negative for dysuria, frequency, genital sores, hematuria, pelvic pain, urgency, vaginal bleeding and  vaginal discharge.   Musculoskeletal:  Positive for arthralgias and myalgias. Negative for joint swelling.   Skin:  Negative for rash.   Neurological:  Negative for dizziness, weakness, headaches and paresthesias.   Psychiatric/Behavioral:  Negative for mood changes. The patient is not nervous/anxious.      {ROS COMP (Optional):271881}    OBJECTIVE:   /76 (BP Location: Right arm, Patient Position: Sitting, Cuff Size: Adult Regular)   Pulse 60   Temp 98.3  F (36.8  C) (Oral)   Ht 1.524 m (5')   Wt 49 kg (108 lb)   LMP 2019 (Approximate)   SpO2 98%   BMI 21.09 kg/m   Estimated body mass index is 21.09 kg/m  as calculated from the following:    Height as of this encounter: 1.524 m (5').    Weight as of this encounter: 49 kg (108 lb).  Physical Exam  {Exam (Optional) :823879}    {Diagnostic Test Results (Optional):410140}    ASSESSMENT / PLAN:   {Diag Picklist:417097}    {Patient advised of split billing (Optional):540641}      COUNSELING:  {Medicare Counselin}        She reports that she quit smoking about 36 years ago. Her smoking use included cigarettes. She has never used smokeless tobacco.      Appropriate preventive services were discussed with this patient, including applicable screening as appropriate for fall prevention, nutrition, physical activity, Tobacco-use cessation, weight loss and cognition.  Checklist reviewing preventive services available has been given to the patient.    Reviewed patients plan of care and provided an AVS. The {CarePlan:973943} for David meets the Care Plan requirement. This Care Plan has been established and reviewed with the {PATIENT, FAMILY MEMBER, CAREGIVER:982875}.    {Counseling Resources  [place] Preventive Services Task Force  Cholesterol Screening  Health diet/nutrition  Pooled Cohorts Equation Calculator  USDA's MyPlate  ASA Prophylaxis  Lung CA Screening  Osteoporosis prevention/bone health :147030}  {Breast Cancer Risk Calculator  BRCA-Related  Cancer Risk Assessment FHS-7 Tool :053959}    [facility]    Identified Health Risks:  {Medicare required documentation of substance and opioid use disorders screening :009883}

## 2023-10-05 NOTE — PROGRESS NOTES
New Patient Oncology Nurse Navigator Note     Referring provider: Courtney Pillai CNP    Referring Clinic/Organization: [facility]  Referred to: Thoracic Surgery  Requested provider (if applicable): First available - did not specify   Referral Received: 10/05/23  (referral in wrong workqueue)     Evaluation for :   Diagnosis   K21.00 (ICD-10-CM) - Gastroesophageal reflux disease with esophagitis without hemorrhage   Additional Information:  GERD with history of Hiatal Hernia     Clinical History (per Nurse review of records provided):      05/15/2012 EGD (bookmarked)    Clinical Assessment / Barriers to Care (Per Nurse):    Tobacco History    Smoking Status  Former Quit Date  1/2/1987 Smoking Tobacco Type  Cigarettes quit in 1/2/1987     Records Location: [facility]   Additional testing needed prior to consult: CT Chest    ZULY CarboneN, RN, OCN  [facility] Oncology Nurse Navigator  [phone] / [phone]

## 2023-10-05 NOTE — PATIENT INSTRUCTIONS
Patient Education   Personalized Prevention Plan  You are due for the preventive services outlined below.  Your care team is available to assist you in scheduling these services.  If you have already completed any of these items, please share that information with your care team to update in your medical record.  Health Maintenance Due   Topic Date Due     ANNUAL REVIEW OF HM ORDERS  Never done     Discuss Advance Care Planning  Never done     Hepatitis B Vaccine (1 of 3 - 3-dose series) Never done     HIV Screening  Never done     Diptheria Tetanus Pertussis (DTAP/TDAP/TD) Vaccine (1 - Tdap) 01/03/1992     Zoster (Shingles) Vaccine (1 of 2) Never done     Annual Wellness Visit  09/27/2019     Cholesterol Lab  04/18/2021     HPV Screening  08/28/2022     PAP Smear  08/28/2022     Flu Vaccine (1) Never done     COVID-19 Vaccine (3 - 2023-24 season) 09/01/2023     Preventing Falls: Care Instructions    Talk to your doctor about the medicines you take. Ask if any of them increase the risk of falls and whether they can be changed or stopped.   Try to exercise regularly. It can help improve your strength and balance. This can help lower your risk of falling.     Practice fall safety and prevention.    Wear low-heeled shoes that fit well and give your feet good support. Talk to your doctor if you have foot problems that make this hard.  Carry a cellphone or wear a medical alert device that you can use to call for help.  Use stepladders instead of chairs to reach high objects. Don't climb if you're at risk for falls. Ask for help, if needed.  Wear the correct eyeglasses, if you need them.    Make your home safer.    Remove rugs, cords, clutter, and furniture from walkways.  Keep your house well lit. Use night-lights in hallways and bathrooms.  Install and use sturdy handrails on stairways.  Wear nonskid footwear, even inside. Don't walk barefoot or in socks without shoes.    Be safe outside.    Use handrails, curb cuts,  "and ramps whenever possible.  Keep your hands free by using a shoulder bag or backpack.  Try to walk in well-lit areas. Watch out for uneven ground, changes in pavement, and debris.  Be careful in the winter. Walk on the grass or gravel when sidewalks are slippery. Use de-icer on steps and walkways. Add non-slip devices to shoes.    Put grab bars and nonskid mats in your shower or tub and near the toilet. Try to use a shower chair or bath bench when bathing.   Get into a tub or shower by putting in your weaker leg first. Get out with your strong side first. Have a phone or medical alert device in the bathroom with you.   Where can you learn more?  Go to https://www."[employer].net/patiented  Enter G117 in the search box to learn more about \"Preventing Falls: Care Instructions.\"  Current as of: November 9, 2022               Content Version: 13.7    0553-2811 [employer].   Care instructions adapted under license by your healthcare professional. If you have questions about a medical condition or this instruction, always ask your healthcare professional. [employer] disclaims any warranty or liability for your use of this information.      How to Get Up Safely After a Fall: Care Instructions  Overview     If you have injuries, health problems, or other reasons that may make it easy for you to fall at home, it is a good idea to learn how to get up safely after a fall. Learning how to get up correctly can help you avoid making an injury worse.  Also, knowing what to do if you cannot get up can help you stay safe until help arrives.  Follow-up care is a key part of your treatment and safety. Be sure to make and go to all appointments, and call your doctor if you are having problems. It's also a good idea to know your test results and keep a list of the medicines you take.  How can you care for yourself after a fall?  If you think you can get up  First lie still for a few minutes and think about " how you feel. If your body feels okay and you think you can get up safely, follow the rest of the steps below:  Look for a chair or other piece of furniture that is close to you.  Roll onto your side and rest. Roll by turning your head in the direction you want to roll, move your shoulder and arm, then hip and leg in the same direction.  Lie still for a moment to let your blood pressure adjust.  Slowly push your upper body up, lift your head, and take a moment to rest.  Slowly get up on your hands and knees, and crawl to the chair or other stable piece of furniture.  Put your hands on the chair.  Move one foot forward, and place it flat on the floor. Your other leg should be bent with the knee on the floor.  Rise slowly, turn your body, and sit in the chair. Stay seated for a bit and think about how you feel. Call for help. Even if you feel okay, let someone know what happened to you. You might not know that you have a serious injury.  If you cannot get up  If you think you are injured after a fall or you cannot get up, try not to panic.  Call out for help.  If you have a phone within reach or you have an emergency call device, use it to call for help.  If you do not have a phone within reach, try to slide yourself toward it. If you cannot get to the phone, try to slide toward a door or window or a place where you think you can be heard.  [place] or use an object to make noise so someone might hear you.  If you can reach something that you can use for a pillow, place it under your head. Try to stay warm by covering yourself with a blanket or clothing while you wait for help.  When should you call for help?   Call 911 anytime you think you may need emergency care. For example, call if:    You passed out (lost consciousness).     You cannot get up after a fall.     You have severe pain.   Call your doctor now or seek immediate medical care if:    You have new or worse pain.     You are dizzy or lightheaded.     You hit  "your head.   Watch closely for changes in your health, and be sure to contact your doctor if:    You do not get better as expected.   Where can you learn more?  Go to https://www.[employer].net/patiented  Enter G513 in the search box to learn more about \"How to Get Up Safely After a Fall: Care Instructions.\"  Current as of: November 14, 2022               Content Version: 13.7    [phone] [employer].   Care instructions adapted under license by your healthcare professional. If you have questions about a medical condition or this instruction, always ask your healthcare professional. [employer] disclaims any warranty or liability for your use of this information.         "

## 2023-10-05 NOTE — PROGRESS NOTES
"SUBJECTIVE:   Arminda is a 57 year old who presents for Preventive Visit.      10/5/2023     9:04 AM   Additional Questions   Roomed by Tasha CORTES       Are you in the first 12 months of your Medicare coverage?  No    Healthy Habits:     In general, how would you rate your overall health?  Good    Frequency of exercise:  2-3 days/week    Duration of exercise:  30-45 minutes    Do you usually eat at least 4 servings of fruit and vegetables a day, include whole grains    & fiber and avoid regularly eating high fat or \"junk\" foods?  Yes    Taking medications regularly:  Yes    Ability to successfully perform activities of daily living:  No assistance needed    Home Safety:  Lack of grab bars in the bathroom    Hearing Impairment:  No hearing concerns    In the past 6 months, have you been bothered by leaking of urine?  No    In general, how would you rate your overall mental or emotional health?  Good    Additional concerns today:  No      Today's PHQ-2 Score:       10/5/2023     9:24 AM   PHQ-2 ( 1999 [employer])   Q1: Little interest or pleasure in doing things 0   Q2: Feeling down, depressed or hopeless 0   PHQ-2 Score 0           Have you ever done Advance Care Planning? (For example, a Health Directive, POLST, or a discussion with a medical provider or your loved ones about your wishes): No, advance care planning information given to patient to review.  Patient declined advance care planning discussion at this time.       Fall risk  Fallen 2 or more times in the past year?: Yes  Any fall with injury in the past year?: No    Cognitive Screening   1) Repeat 3 items (Leader, Season, Table)      2) Clock draw: NORMAL  3) 3 item recall: Recalls 3 objects  Results: 3 items recalled: COGNITIVE IMPAIRMENT LESS LIKELY    Mini-CogTM Copyright S Sonny. Licensed by the author for use in [facility]; reprinted with permission ([email or web address]@.[facility]). All rights reserved.        Reviewed and updated as needed this visit by " clinical staff   Tobacco  Allergies  Meds              Reviewed and updated as needed this visit by Provider                 Social History     Tobacco Use     Smoking status: Former     Types: Cigarettes     Quit date: 1987     Years since quittin.7     Smokeless tobacco: Never   Substance Use Topics     Alcohol use: Yes     Alcohol/week: 0.0 - 1.0 standard drinks of alcohol     Comment: occasional           Do you have a current opioid prescription? No  Do you use any other controlled substances or medications that are not prescribed by a provider? None    Current providers sharing in care for this patient include:   Patient Care Team:  Naima Saul DO as PCP - General (Family Practice)  Ned Hunt MD as Referring Physician (Physical Medicine and Rehabilitation)  Young Husain MD as MD (Family Practice)  Arden Hampton DO as Assigned PCP    The following health maintenance items are reviewed in Epic and correct as of today:  Health Maintenance   Topic Date Due     ANNUAL REVIEW OF HM ORDERS  Never done     ADVANCE CARE PLANNING  Never done     HEPATITIS B IMMUNIZATION (1 of 3 - 3-dose series) Never done     HIV SCREENING  Never done     DTAP/TDAP/TD IMMUNIZATION (1 - Tdap) 1992     ZOSTER IMMUNIZATION (1 of 2) Never done     MEDICARE ANNUAL WELLNESS VISIT  2019     LIPID  2021     HPV TEST  2022     PAP  2022     INFLUENZA VACCINE (1) Never done     COVID-19 Vaccine (3 - - season) 2023     MAMMO SCREENING  2024     COLORECTAL CANCER SCREENING  2026     HEPATITIS C SCREENING  Completed     PHQ-2 (once per calendar year)  Completed     Pneumococcal Vaccine: Pediatrics (0 to 5 Years) and At-Risk Patients (6 to 64 Years)  Aged Out     IPV IMMUNIZATION  Aged Out     HPV IMMUNIZATION  Aged Out     MENINGITIS IMMUNIZATION  Aged Out     Lab work is in process  Last 3 Pap and HPV Results:       Latest Ref Rng & Units 2018    11:29  AM 9/27/2018    11:23 AM 6/15/2017    10:35 AM   PAP / HPV   PAP (Historical)  NIL      HPV 16 DNA NEG^Negative  Negative  Positive    HPV 18 DNA NEG^Negative  Negative  Negative    Other HR HPV NEG^Negative  Negative  Positive          Pertinent mammograms are reviewed under the imaging tab.    Review of Systems   Constitutional:  Negative for chills and fever.   HENT:  Positive for sore throat. Negative for congestion, ear pain and hearing loss.    Eyes:  Negative for pain and visual disturbance.   Respiratory:  Negative for cough and shortness of breath.    Cardiovascular:  Negative for chest pain, palpitations and peripheral edema.   Gastrointestinal:  Positive for heartburn. Negative for abdominal pain, constipation, diarrhea, hematochezia and nausea.   Breasts:  Negative for tenderness, breast mass and discharge.   Genitourinary:  Negative for dysuria, frequency, genital sores, hematuria, pelvic pain, urgency, vaginal bleeding and vaginal discharge.   Musculoskeletal:  Positive for arthralgias and myalgias. Negative for joint swelling.   Skin:  Negative for rash.   Neurological:  Negative for dizziness, weakness, headaches and paresthesias.   Psychiatric/Behavioral:  Negative for mood changes. The patient is not nervous/anxious.          OBJECTIVE:   /76 (BP Location: Right arm, Patient Position: Sitting, Cuff Size: Adult Regular)   Pulse 60   Temp 98.3  F (36.8  C) (Oral)   Ht 1.524 m (5')   Wt 49 kg (108 lb)   LMP 05/04/2019 (Approximate)   SpO2 98%   BMI 21.09 kg/m   Estimated body mass index is 21.09 kg/m  as calculated from the following:    Height as of this encounter: 1.524 m (5').    Weight as of this encounter: 49 kg (108 lb).  Physical Exam  Constitutional:       Appearance: Normal appearance.   HENT:      Head: Normocephalic.      Nose: Nose normal.      Mouth/Throat:      Mouth: Mucous membranes are moist.   Eyes:      Pupils: Pupils are equal, round, and reactive to light.    Cardiovascular:      Rate and Rhythm: Normal rate and regular rhythm.      Pulses: Normal pulses.      Heart sounds: Normal heart sounds. No murmur heard.  Pulmonary:      Effort: Pulmonary effort is normal. No respiratory distress.      Breath sounds: Normal breath sounds. No wheezing.   Abdominal:      General: Bowel sounds are normal. There is no distension.      Tenderness: There is no abdominal tenderness.   Genitourinary:     General: Normal vulva.      Rectum: Normal.   Musculoskeletal:         General: No swelling.      Comments: Patient has BKA   Skin:     General: Skin is warm.      Coloration: Skin is not jaundiced.      Findings: No bruising.   Neurological:      General: No focal deficit present.      Mental Status: She is alert and oriented to person, place, and time.   Psychiatric:         Mood and Affect: Mood normal.         Behavior: Behavior normal.           Diagnostic Test Results:  Labs reviewed in Epic  Results for orders placed or performed in visit on 10/05/23   CBC with platelets and differential     Status: None   Result Value Ref Range    WBC Count 5.7 4.0 - 11.0 10e3/uL    RBC Count 4.08 3.80 - 5.20 10e6/uL    Hemoglobin 12.6 11.7 - 15.7 g/dL    Hematocrit 38.2 35.0 - 47.0 %    MCV 94 78 - 100 fL    MCH 30.9 26.5 - 33.0 pg    MCHC 33.0 31.5 - 36.5 g/dL    RDW 13.0 10.0 - 15.0 %    Platelet Count 322 150 - 450 10e3/uL    % Neutrophils 45 %    % Lymphocytes 37 %    % Monocytes 6 %    Mids % (Monos, Eos, Basos)      % Eosinophils 11 %    % Basophils 1 %    % Immature Granulocytes 0 %    Absolute Neutrophils 2.6 1.6 - 8.3 10e3/uL    Absolute Lymphocytes 2.1 0.8 - 5.3 10e3/uL    Absolute Monocytes 0.3 0.0 - 1.3 10e3/uL    Mids Abs (Monos, Eos, Basos)      Absolute Eosinophils 0.7 0.0 - 0.7 10e3/uL    Absolute Basophils 0.1 0.0 - 0.2 10e3/uL    Absolute Immature Granulocytes 0.0 <=0.4 10e3/uL   CBC with platelets and differential     Status: None    Narrative    The following orders were  created for panel order CBC with platelets and differential.  Procedure                               Abnormality         Status                     ---------                               -----------         ------                     CBC with platelets and d...[[id number]]                      Final result                 Please view results for these tests on the individual orders.       ASSESSMENT / PLAN:     Z00.00) Encounter for Medicare annual wellness exam  Comment: The patient was seen for wellness exam. Patient did request for shoulder pain and GERD symptoms. Patient does believe in vaccines and declined those today. Discussion was had regarding need for a PAP and labs today.   Plan: Lipid panel reflex to direct LDL Fasting, CBC         with platelets and differential, Comprehensive         metabolic panel (BMP + Alb, Alk Phos, ALT, AST,        Total. Bili, TP)        Pending    (Z11.4) Screening for HIV (human immunodeficiency virus)  (primary encounter diagnosis)  Comment: Discussed screening with patient  Plan: HIV Antigen Antibody Combo        Pending    (Z23) Need for shingles vaccine  Comment: Discussed with patient need for vaccine.  Plan: Declined    (Z23) Need for Tdap vaccination  Comment: Discussed need for vaccine   Plan: Declined    (Z23) Need for prophylactic vaccination against hepatitis B virus  Comment: Discussed need for vaccine  Plan: Declined    (Z12.4) Cervical cancer screening  Comment: Discussed screening with patient  Plan: Pap Screen with HPV - recommended age 30 - 65         years        Pending    (K21.00) Gastroesophageal reflux disease with esophagitis without hemorrhage  Comment: Patient has hiatal hernia and has tried over the counter pepside and has gotten no relief. Requested GI consult today.   Plan: Adult GI  Referral - Consult Only        Future    (M25.511,  G89.29,  M25.512) Chronic pain of both shoulders  Comment: Patient has chronic pain to shoulders and has  gotten worse. Requested referral to Orthopedist.  Plan: Orthopedic  Referral        Future    (S81.183J) Below-knee amputation (H)  Comment: Patient had BKA at age 13. Uses a prosthetic skin at stump CDI.   Plan: Monitor as needed.     (Z93.2) Ileostomy status (H)  Comment: Patient has ileostomy out put good, urine is yellow, patient denies pain or s/sx of UTI.  Plan: Monitor as needed.      COUNSELING:  Reviewed preventive health counseling, as reflected in patient instructions        She reports that she quit smoking about 36 years ago. Her smoking use included cigarettes. She has never used smokeless tobacco.      Appropriate preventive services were discussed with this patient, including applicable screening as appropriate for fall prevention, nutrition, physical activity, Tobacco-use cessation, weight loss and cognition.  Checklist reviewing preventive services available has been given to the patient.    Reviewed patients plan of care and provided an AVS. The Basic Care Plan (routine screening as documented in Health Maintenance) for David meets the Care Plan requirement. This Care Plan has been established and reviewed with the Patient.          YVROSE Zepeda [facility] [place]    Identified Health Risks:  I have reviewed Opioid Use Disorder and Substance Use Disorder risk factors and made any needed referrals.   She is at risk for falling and has been provided with information to reduce the risk of falling at home.

## 2023-10-10 LAB
BKR LAB AP GYN ADEQUACY: NORMAL
BKR LAB AP GYN INTERPRETATION: NORMAL
BKR LAB AP HPV REFLEX: NORMAL
BKR LAB AP PREVIOUS ABNORMAL: NORMAL
PATH REPORT.COMMENTS IMP SPEC: NORMAL
PATH REPORT.COMMENTS IMP SPEC: NORMAL
PATH REPORT.RELEVANT HX SPEC: NORMAL

## 2023-10-12 LAB
HUMAN PAPILLOMA VIRUS 16 DNA: NEGATIVE
HUMAN PAPILLOMA VIRUS 18 DNA: NEGATIVE
HUMAN PAPILLOMA VIRUS FINAL DIAGNOSIS: NORMAL
HUMAN PAPILLOMA VIRUS OTHER HR: NEGATIVE

## 2023-10-16 ENCOUNTER — ANCILLARY PROCEDURE (OUTPATIENT)
Dept: GENERAL RADIOLOGY | Facility: CLINIC | Age: 58
End: 2023-10-16
Attending: PEDIATRICS
Payer: COMMERCIAL

## 2023-10-16 ENCOUNTER — OFFICE VISIT (OUTPATIENT)
Dept: ORTHOPEDICS | Facility: CLINIC | Age: 58
End: 2023-10-16
Payer: COMMERCIAL

## 2023-10-16 VITALS — HEIGHT: 60 IN | WEIGHT: 108 LBS | BODY MASS INDEX: 21.2 KG/M2

## 2023-10-16 DIAGNOSIS — M25.511 CHRONIC PAIN OF BOTH SHOULDERS: Primary | ICD-10-CM

## 2023-10-16 DIAGNOSIS — G89.29 CHRONIC PAIN OF BOTH SHOULDERS: ICD-10-CM

## 2023-10-16 DIAGNOSIS — G89.29 CHRONIC PAIN OF BOTH SHOULDERS: Primary | ICD-10-CM

## 2023-10-16 DIAGNOSIS — M25.511 CHRONIC PAIN OF BOTH SHOULDERS: ICD-10-CM

## 2023-10-16 DIAGNOSIS — M25.512 CHRONIC PAIN OF BOTH SHOULDERS: Primary | ICD-10-CM

## 2023-10-16 DIAGNOSIS — M25.512 CHRONIC PAIN OF BOTH SHOULDERS: ICD-10-CM

## 2023-10-16 PROCEDURE — 73030 X-RAY EXAM OF SHOULDER: CPT | Mod: TC | Performed by: RADIOLOGY

## 2023-10-16 PROCEDURE — 99204 OFFICE O/P NEW MOD 45 MIN: CPT | Performed by: PEDIATRICS

## 2023-10-16 NOTE — PATIENT INSTRUCTIONS
We discussed these other possible diagnosis: Chronic pain of both shoulders.  Left shoulder with some glenohumeral joint arthritis.    Low suspicion for rotator cuff tear given current history and exam.  Recommended rest from irritating activities coupled with physical therapy.  Would consider further imaging or treatment pending clinical course.    Plan:  - Today's Plan of Care:  Rehab: [facility] [phone]    Discussed activity considerations and other supportive care including Ice/Heat, OTC and other topical medications as needed.    -We also discussed other future treatment options:  US guided glenohumeral joint injections  MRI bilateral shoulder    Follow Up: 6 - 8 weeks    If you have any further questions for your physician or physician s care team you can call [phone] and use option 3 to leave a voice message.

## 2023-10-16 NOTE — LETTER
10/16/2023         RE: David Grissom  [address]  [place] [address]        Dear Colleague,    Thank you for referring your patient, David Grissom, to the [facility]. Please see a copy of my visit note below.    ASSESSMENT & PLAN    Arminda was seen today for pain and pain.    Diagnoses and all orders for this visit:    Chronic pain of both shoulders  -     XR Shoulder Bilateral G/E 2 Views; Future  -     Orthopedic  Referral  -     Physical Therapy Referral; Future      This issue is chronic and Unchanged.        ICD-10-CM    1. Chronic pain of both shoulders  M25.511 XR Shoulder Bilateral G/E 2 Views    G89.29 Orthopedic  Referral    M25.512 Physical Therapy Referral          We discussed these other possible diagnosis: Chronic pain of both shoulders.  Left shoulder with some glenohumeral joint arthritis.    Low suspicion for rotator cuff tear given current history and exam.  Recommended rest from irritating activities coupled with physical therapy.  Would consider further imaging or treatment pending clinical course.    Plan:  - Today's Plan of Care:  Rehab: Physical Therapy: [facility] - [phone]    Discussed activity considerations and other supportive care including Ice/Heat, OTC and other topical medications as needed.    -We also discussed other future treatment options:  US guided glenohumeral joint injections  MRI bilateral shoulder    Follow Up: 6 - 8 weeks    Concerning signs and symptoms were reviewed and all questions were answered at this time.    Thanks for the opportunity to participate in the care of this patient, I will keep you updated on their progress.    CC: Courtney Falcon MD [facility]  Sports Medicine Physician  [facility]    -----  Chief Complaint   Patient presents with     Left Shoulder - Pain     Right Shoulder - Pain       SUBJECTIVE  David Grissom is  a/an 57 year old female who is seen in consultation at the request of  Courtney Pillai C.N.P. for evaluation of bilateral shoulder.     The patient is seen by themselves.    Onset: 1 years(s) ago. Reports insidious onset without acute precipitating event. Has been on crutches all her life and notes that she is starting to have problems with her shoulders, especially with pulling herself up, transferring.    Location of Pain: bilateral shoulder, lateral shoulder, flexion, extension with internal rotation, and abduction  Worsened by: pushing down to elevate herself  Better with:   Treatments tried: Tylenol  Associated symptoms: no distal numbness or tingling; denies swelling or warmth    Orthopedic/Surgical history: NO  Social History/Occupation: Takes care of three kids and a dog    REVIEW OF SYSTEMS:  Review of Systems    OBJECTIVE:  Ht 1.524 m (5')   Wt 49 kg (108 lb)   LMP 05/04/2019 (Approximate)   BMI 21.09 kg/m     General: healthy, alert and in no distress  Skin: no suspicious lesions or rash.  CV: distal perfusion intact  Resp: normal respiratory effort without conversational dyspnea   Psych: normal mood and affect  Gait: NORMAL  Neuro: Normal light sensory exam of upper extremity    Bilateral Shoulder exam    Inspection and Posture:       normal    Skin:        no visible deformities    Tender:        none    Non Tender:       remainder of shoulder bilateral    ROM:        forward flexion 160  bilateral       abduction 160 bilateral       internal rotation lumbar region bilateral       external rotation 45 bilateral    Painful motions:       end range flexion and elevation bilateral    Strength:        abduction 5/5 bilateral       flexion 5/5 bilateral       internal rotation 5/5 bilateral       external rotation 5/5 bilateral    Impingement testing:       neg (-) Neer bilateral       neg (-) Barbour bilateral    Sensation:        normal sensation over shoulder and upper extremity      RADIOLOGY:  Final results and radiologist's interpretation, available in the [facility] health record.  Images were reviewed with the patient in the office today.  My personal interpretation of the performed imagin XR views of bilateral shoulders reviewed: no acute bony abnormality, mild - moderate degenerative changes  - will follow official read    Reviewed PCP note  Review of the result(s) of each unique test - XR s             Again, thank you for allowing me to participate in the care of your patient.        Sincerely,        Selena Falcon MD

## 2023-10-16 NOTE — PROGRESS NOTES
ASSESSMENT & PLAN    Arminda was seen today for pain and pain.    Diagnoses and all orders for this visit:    Chronic pain of both shoulders  -     XR Shoulder Bilateral G/E 2 Views; Future  -     Orthopedic  Referral  -     Physical Therapy Referral; Future      This issue is chronic and Unchanged.        ICD-10-CM    1. Chronic pain of both shoulders  M25.511 XR Shoulder Bilateral G/E 2 Views    G89.29 Orthopedic  Referral    M25.512 Physical Therapy Referral          We discussed these other possible diagnosis: Chronic pain of both shoulders.  Left shoulder with some glenohumeral joint arthritis.    Low suspicion for rotator cuff tear given current history and exam.  Recommended rest from irritating activities coupled with physical therapy.  Would consider further imaging or treatment pending clinical course.    Plan:  - Today's Plan of Care:  Rehab: Physical Therapy: [facility] - [phone]    Discussed activity considerations and other supportive care including Ice/Heat, OTC and other topical medications as needed.    -We also discussed other future treatment options:  US guided glenohumeral joint injections  MRI bilateral shoulder    Follow Up: 6 - 8 weeks    Concerning signs and symptoms were reviewed and all questions were answered at this time.    Thanks for the opportunity to participate in the care of this patient, I will keep you updated on their progress.    CC: Courtney Falcon MD [facility]  Sports Medicine Physician  [facility]    -----  Chief Complaint   Patient presents with    Left Shoulder - Pain    Right Shoulder - Pain       SUBJECTIVE  David Grissom is a/an 57 year old female who is seen in consultation at the request of  Courtney Pillai C.N.P. for evaluation of bilateral shoulder.     The patient is seen by themselves.    Onset: 1 years(s) ago. Reports insidious onset without acute precipitating event. Has been on  crutches all her life and notes that she is starting to have problems with her shoulders, especially with pulling herself up, transferring.    Location of Pain: bilateral shoulder, lateral shoulder, flexion, extension with internal rotation, and abduction  Worsened by: pushing down to elevate herself  Better with:   Treatments tried: Tylenol  Associated symptoms: no distal numbness or tingling; denies swelling or warmth    Orthopedic/Surgical history: NO  Social History/Occupation: Takes care of three kids and a dog    REVIEW OF SYSTEMS:  Review of Systems    OBJECTIVE:  Ht 1.524 m (5')   Wt 49 kg (108 lb)   LMP 2019 (Approximate)   BMI 21.09 kg/m     General: healthy, alert and in no distress  Skin: no suspicious lesions or rash.  CV: distal perfusion intact  Resp: normal respiratory effort without conversational dyspnea   Psych: normal mood and affect  Gait: NORMAL  Neuro: Normal light sensory exam of upper extremity    Bilateral Shoulder exam    Inspection and Posture:       normal    Skin:        no visible deformities    Tender:        none    Non Tender:       remainder of shoulder bilateral    ROM:        forward flexion 160  bilateral       abduction 160 bilateral       internal rotation lumbar region bilateral       external rotation 45 bilateral    Painful motions:       end range flexion and elevation bilateral    Strength:        abduction 5/5 bilateral       flexion 5/5 bilateral       internal rotation 5/5 bilateral       external rotation 5/5 bilateral    Impingement testing:       neg (-) Neer bilateral       neg (-) Barbour bilateral    Sensation:        normal sensation over shoulder and upper extremity     RADIOLOGY:  Final results and radiologist's interpretation, available in the [facility] health record.  Images were reviewed with the patient in the office today.  My personal interpretation of the performed imagin XR views of bilateral shoulders reviewed: no acute bony abnormality,  mild - moderate degenerative changes  - will follow official read    Reviewed PCP note  Review of the result(s) of each unique test - XR s

## 2023-11-20 ENCOUNTER — THERAPY VISIT (OUTPATIENT)
Dept: PHYSICAL THERAPY | Facility: CLINIC | Age: 58
End: 2023-11-20
Attending: PEDIATRICS
Payer: COMMERCIAL

## 2023-11-20 DIAGNOSIS — G89.29 CHRONIC PAIN OF BOTH SHOULDERS: ICD-10-CM

## 2023-11-20 DIAGNOSIS — M25.511 CHRONIC PAIN OF BOTH SHOULDERS: ICD-10-CM

## 2023-11-20 DIAGNOSIS — M25.512 CHRONIC PAIN OF BOTH SHOULDERS: ICD-10-CM

## 2023-11-20 PROCEDURE — 97110 THERAPEUTIC EXERCISES: CPT | Mod: GP | Performed by: PHYSICAL THERAPIST

## 2023-11-20 PROCEDURE — 97161 PT EVAL LOW COMPLEX 20 MIN: CPT | Mod: GP | Performed by: PHYSICAL THERAPIST

## 2023-11-20 NOTE — TELEPHONE ENCOUNTER
RECORDS STATUS - ALL OTHER DIAGNOSIS      RECORDS RECEIVED FROM: Eliseo Canseco   DATE RECEIVED: 11/22   NOTES STATUS DETAILS   DISCHARGE SUMMARY from hospital CE - Harleen 7/4/23   DISCHARGE REPORT from the ER CE - Harleen 11/13/23, 9/4/23, 4/25/23   MEDICATION LIST MIRTA Canseco   LABS     ANYTHING RELATED TO DIAGNOSIS Epic/MIRTA 11/13/23   IMAGING (NEED IMAGES & REPORT)     XR PACS 11/13/23, 9/4/23, 7/4/23: Harleen

## 2023-11-20 NOTE — PROGRESS NOTES
PHYSICAL THERAPY EVALUATION  Type of Visit: Evaluation    See electronic medical record for Abuse and Falls Screening details.    Subjective A couple of years ago was getting out of a van and had to push herself up with her hands and had shoulder pain. Gets pain in both shoulders on outside of shoulder joint (lateral). Lifting arms out to sides will also increase pain. Goal is to be able to go from sitting to standing without pain. No neck pain. Pain does stops FDC to the elbow. Heat helps with pain.       Presenting condition or subjective complaint: Shoulder pain  Date of onset: 11/20/21    Relevant medical history: Arthritis; Chest pain; Seizures; Stroke   Dates & types of surgery: Many    Prior diagnostic imaging/testing results: X-ray     Prior therapy history for the same diagnosis, illness or injury: No      Prior Level of Function  Transfers: Independent, Assistive equipment  Ambulation: Independent, Assistive equipment  ADL: Independent, Assistive equipment  IADL:     Living Environment  Social support: With a significant other or spouse   Type of home: House   Stairs to enter the home: Yes 3 Is there a railing: Yes   Ramp: No   Stairs inside the home: Yes 10 Is there a railing: Yes   Help at home: None  Equipment owned: Crutches; Standard wheelchair     Employment: Yes Personal care attendant  Hobbies/Interests: Reading, going for walks, watching movies, crafts    Patient goals for therapy:      Pain assessment:      Objective     SHOULDER EVALUATION  PAIN: Pain Level at Rest: 4/10  Pain Level with Use: 10/10  INTEGUMENTARY (edema, incisions):   POSTURE: Sitting Posture: Rounded shoulders, Forward head  GAIT:   Weightbearing Status: WBAT  Assistive Device(s): Crutches (bilateral), Prosthesis: Right  Gait Deviations:   BALANCE/PROPRIOCEPTION:   WEIGHTBEARING ALIGNMENT:   ROM:   (Degrees) Left AROM Left PROM Right AROM  Right PROM   Shoulder Flexion 107  110    Shoulder Extension       Shoulder Abduction  85  96    Shoulder Adduction       Shoulder Internal Rotation T11  T12    Shoulder External Rotation  90  90   Shoulder Horizontal Abduction       Shoulder Horizontal Adduction       Shoulder Flexion ER       Shoulder Flexion IR       Elbow Extension       Elbow Flexion         STRENGTH:   Pain: - none + mild ++ moderate +++ severe  Strength Scale: 0-5/5 Left Right   Shoulder Flexion 5 5   Shoulder Extension     Shoulder Abduction 4 4   Shoulder Adduction     Shoulder Internal Rotation 4 4   Shoulder External Rotation 4 4   Shoulder Horizontal Abduction     Shoulder Horizontal Adduction     Elbow Flexion     Elbow Extension     Mid Trap     Lower Trap     Rhomboid     Serratus Anterior       FLEXIBILITY:   SPECIAL TESTS:    Left Right   Impingement     Neer's Positive Positive   Hawkin's-Cristobal Positive Positive   Coracoid Impingement     Internal impingement     Labral     Anterior Slide     Paulding's Positive Positive   Crank     Instability     Apprehension (anterior)     Relocation (anterior)     Anterior Load & Shift     Posterior Load & Shift     Posterior instability (with 90 degrees flex)     Multi-Directional Instability      Sulcus     Biceps      Speed's     Rotator Cuff Tear     Drop Arm Negative  Negative    Belly Press Negative  Negative    Lift off  Negative  Negative      PALPATION:   + Tenderness At Location Left Right   Clavicle - -   Sternoclavicular - -   Acromioclavicular - -   Biceps - -   Triceps     Supraspinatus + +   Infraspinatus + +   Teres minor + +   Subscapularis     Deltoid     Levator - -   Rhomboids - -   Upper trap - -   Incisional     Bicipital groove - -     JOINT MOBILITY:   CERVICAL SCREEN:      Left Right   Alar Ligament    Cervical Flexion-Rotation     Cervical Rot/Lateral Flex     Compression     Distraction     Spurling s Negative  Negative    Thoracic Outlet Screen (Deborah Riddle)     Transverse Ligament     Vertebral Artery         Assessment & Plan   CLINICAL  IMPRESSIONS  Medical Diagnosis: Chronic pain of both shoulders    Treatment Diagnosis: B shoulder pain   Impression/Assessment: Patient is a 58 year old female with bilateral shoulder complaints.  The following significant findings have been identified: Pain, Decreased ROM/flexibility, Decreased joint mobility, Decreased strength, and Decreased activity tolerance. These impairments interfere with their ability to perform self care tasks, recreational activities, household chores, household mobility, and community mobility as compared to previous level of function.     Clinical Decision Making (Complexity):  Clinical Presentation: Stable/Uncomplicated  Clinical Presentation Rationale: based on medical and personal factors listed in PT evaluation  Clinical Decision Making (Complexity): Low complexity    PLAN OF CARE  Treatment Interventions:  Interventions: Manual Therapy, Neuromuscular Re-education, Therapeutic Activity, Therapeutic Exercise, Self-Care/Home Management    Long Term Goals     PT Goal 1  Goal Identifier: Sit to stand  Goal Description: Pt will be able to transfer from sitting to standing with a minimal increase in shoulder pain 1-2/10.  Target Date: 01/15/24  PT Goal 2  Goal Identifier: Overhead reaching  Goal Description: Pt will be able to reach overhead in order to grab an item from a cupboard with a minimal increase in pain 1-2/10.  Target Date: 01/15/24      Frequency of Treatment: 1 time per week  Duration of Treatment: 8 weeks    Recommended Referrals to Other Professionals:   Education Assessment:   Learner/Method: Patient;No Barriers to Learning    Risks and benefits of evaluation/treatment have been explained.   Patient/Family/caregiver agrees with Plan of Care.     Evaluation Time:     PT Eval, Low Complexity Minutes (01958): 21       Signing Clinician: Ramy Rios PT      [facility]                                                                                    OUTPATIENT PHYSICAL THERAPY      PLAN OF TREATMENT FOR OUTPATIENT REHABILITATION   Patient's Last Name, First Name, David Hernandes    YOB: 1965   Provider's Name   [facility]   Medical Record No.  [id number]     Onset Date: 11/20/21  Start of Care Date: 11/20/23     Medical Diagnosis:  Chronic pain of both shoulders      PT Treatment Diagnosis:  B shoulder pain Plan of Treatment  Frequency/Duration: 1 time per week/ 8 weeks    Certification date from 11/20/23 to 01/15/24         See note for plan of treatment details and functional goals     Ramy Rios, PT                         I CERTIFY THE NEED FOR THESE SERVICES FURNISHED UNDER        THIS PLAN OF TREATMENT AND WHILE UNDER MY CARE     (Physician attestation of this document indicates review and certification of the therapy plan).              Referring Provider:  Selena Falcon    Initial Assessment  See Epic Evaluation- Start of Care Date: 11/20/23

## 2023-11-22 ENCOUNTER — ANCILLARY PROCEDURE (OUTPATIENT)
Dept: CT IMAGING | Facility: CLINIC | Age: 58
End: 2023-11-22
Attending: CLINICAL NURSE SPECIALIST
Payer: COMMERCIAL

## 2023-11-22 DIAGNOSIS — K21.00 GASTROESOPHAGEAL REFLUX DISEASE WITH ESOPHAGITIS WITHOUT HEMORRHAGE: ICD-10-CM

## 2023-11-22 PROCEDURE — 71250 CT THORAX DX C-: CPT | Performed by: RADIOLOGY

## 2023-11-27 NOTE — PROGRESS NOTES
THORACIC SURGERY - NEW PATIENT OFFICE VISIT      Dear Dr. Saul,    I saw Arminad Grissom at [facility] [place] request in consultation for the evaluation and treatment of gastroesophageal reflux disease.     HPI  Arminda Grissom is a 58 year old female with long-standing gastroesophageal reflux disease, for at least 10 years. She has has dysphagia for the last year  and she currently eats chopped  food only. She has lost weight - unsure how much, maybe 10 pounds over last year. She has water brash and on & off chest pain, which has gotten worse over the past year.  She currently takes TUMS daily and no other antacids. She takes Prilosec occasionally. She had taken Prevacid regularly.   Of note, she has spina bifida. He uses a wheelchair, but does home activities.   Previsit Tests   CT chest 11/22/2023:  No hiatal hernia  EGD: 5/15/2012: ?Large hiatus hernia    PMH  Reviewed, as below    Past Medical History:   Diagnosis Date    ASCUS with positive high risk HPV cervical 06/15/2017    type 16 & other HR HPV    Spina bifida (H)         PSH  Reviewed, as below    Past Surgical History:   Procedure Laterality Date    GYN SURGERY  1999    Unilateral oopherectomy    ORTHOPEDIC SURGERY      Multiple surgeries on back, etc.     UROLOGY PROCEDURE                         DATE:  1970    Urostomy    Cholectystectomy    ETOH:  About 6 oz wine weekly.  TOB:  ages [age], 1/2 pack per day.     Physical examination  /78 (BP Location: Right arm, Patient Position: Sitting, Cuff Size: Adult Regular)   Pulse 68   Temp 97.9  F (36.6  C) (Oral)   Resp 16   Wt 52.7 kg (116 lb 3.2 oz)   LMP 05/04/2019 (Approximate)   SpO2 99%   BMI 22.69 kg/m     A&Ox4  Respiratory-Non labored breathing on RA  Cardiac-Regular rate and rhythm  Abdomen-soft, non-tender, urostomy present    From a personal perspective, she is a college student studying [employer] 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 She lives with her  and  daughter.     IMPRESSION (K21.00) Gastroesophageal reflux disease with esophagitis without hemorrhage      This person is a 58 year old female with long-standing gastroesophageal reflux disease and a possible sliding hiatal hernia.    PLAN  I spent 30 min on the date of the encounter in chart review, patient visit, review of tests, documentation and/or discussion with other providers about the issues documented above. I reviewed the plan as follows:    I recommend Prevacid or Prilosec 1/2 hour before breakfast and 1/2 hour before dinner, supplemented by pepcid at bedtime.    I will refer her to GI for endoscopy, pH study, manometry,     She would like to avoid surgery if possible.    I appreciate the opportunity to participate in the care of your patient and will keep you updated.      Sincerely,    Arnel Gannon MD

## 2023-11-28 ENCOUNTER — PRE VISIT (OUTPATIENT)
Dept: SURGERY | Facility: CLINIC | Age: 58
End: 2023-11-28

## 2023-11-28 ENCOUNTER — ONCOLOGY VISIT (OUTPATIENT)
Dept: SURGERY | Facility: CLINIC | Age: 58
End: 2023-11-28
Attending: THORACIC SURGERY (CARDIOTHORACIC VASCULAR SURGERY)
Payer: COMMERCIAL

## 2023-11-28 VITALS
OXYGEN SATURATION: 99 % | SYSTOLIC BLOOD PRESSURE: 135 MMHG | DIASTOLIC BLOOD PRESSURE: 78 MMHG | BODY MASS INDEX: 22.69 KG/M2 | HEART RATE: 68 BPM | TEMPERATURE: 97.9 F | WEIGHT: 116.2 LBS | RESPIRATION RATE: 16 BRPM

## 2023-11-28 DIAGNOSIS — K21.00 GASTROESOPHAGEAL REFLUX DISEASE WITH ESOPHAGITIS WITHOUT HEMORRHAGE: ICD-10-CM

## 2023-11-28 PROCEDURE — G0463 HOSPITAL OUTPT CLINIC VISIT: HCPCS | Performed by: THORACIC SURGERY (CARDIOTHORACIC VASCULAR SURGERY)

## 2023-11-28 PROCEDURE — 99203 OFFICE O/P NEW LOW 30 MIN: CPT | Performed by: THORACIC SURGERY (CARDIOTHORACIC VASCULAR SURGERY)

## 2023-11-28 ASSESSMENT — PAIN SCALES - GENERAL: PAINLEVEL: MILD PAIN (3)

## 2023-11-28 NOTE — LETTER
11/28/2023         RE: David Grissom  [address]  [place] [address]        Dear Colleague,    Thank you for referring your patient, David Grissom, to the [facility]. Please see a copy of my visit note below.    THORACIC SURGERY - NEW PATIENT OFFICE VISIT      Dear Dr. Saul,    I saw Arminda Grissom at [facility] request in consultation for the evaluation and treatment of gastroesophageal reflux disease.     HPI  Arminda Grissom is a 58 year old female with long-standing gastroesophageal reflux disease, for at least 10 years. She has has dysphagia for the last year  and she currently eats chopped  food only. She has lost weight - unsure how much, maybe 10 pounds over last year. She has water brash and on & off chest pain, which has gotten worse over the past year.  She currently takes TUMS daily and no other antacids. She takes Prilosec occasionally. She had taken Prevacid regularly.   Of note, she has spina bifida. He uses a wheelchair, but does home activities.   Previsit Tests   CT chest 11/22/2023:  No hiatal hernia  EGD: 5/15/2012: ?Large hiatus hernia    PMH  Reviewed, as below    Past Medical History:   Diagnosis Date    ASCUS with positive high risk HPV cervical 06/15/2017    type 16 & other HR HPV    Spina bifida (H)         PSH  Reviewed, as below    Past Surgical History:   Procedure Laterality Date    GYN SURGERY  1999    Unilateral oopherectomy    ORTHOPEDIC SURGERY      Multiple surgeries on back, etc.     UROLOGY PROCEDURE                         DATE:  1970    Urostomy    Cholectystectomy    ETOH:  About 6 oz wine weekly.  TOB:  ages [age], 1/2 pack per day.     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 She lives with her  and daughter.     IMPRESSION (K21.00) Gastroesophageal reflux disease with esophagitis without hemorrhage      This person is a 58 year old female with long-standing gastroesophageal reflux disease and a possible sliding hiatal hernia.    PLAN  I spent 30 min on the date of the encounter in chart review, patient visit, review of tests, documentation and/or discussion with other providers about the issues documented above. I reviewed the plan as follows:    I recommend Prevacid or Prilosec 1/2 hour before breakfast and 1/2 hour before dinner, supplemented by pepcid at bedtime.    I will refer her to GI for endoscopy, pH study, manometry,     She would like to avoid surgery if possible.    I appreciate the opportunity to participate in the care of your patient and will keep you updated.      Sincerely,    Arnel Gannon MD

## 2023-11-28 NOTE — PATIENT INSTRUCTIONS
Prilosec in the morning, 1/2 hour before breakfast  Prilosec in the evening, 1/2 hour before dinner  Pepcid 20 mg at bedtime

## 2023-11-28 NOTE — NURSING NOTE
Oncology Rooming Note    November 28, 2023 2:59 PM   David Grissom is a 58 year old female who presents for:    Chief Complaint   Patient presents with    Oncology Clinic Visit     Gastroesophageal reflux disease with esophagitis without hemorrhage     Initial Vitals: /78 (BP Location: Right arm, Patient Position: Sitting, Cuff Size: Adult Regular)   Pulse 68   Temp 97.9  F (36.6  C) (Oral)   Resp 16   Wt 52.7 kg (116 lb 3.2 oz)   LMP 05/04/2019 (Approximate)   SpO2 99%   BMI 22.69 kg/m   Estimated body mass index is 22.69 kg/m  as calculated from the following:    Height as of 10/16/23: 1.524 m (5').    Weight as of this encounter: 52.7 kg (116 lb 3.2 oz). Body surface area is 1.49 meters squared.  Mild Pain (3) Comment: Data Unavailable   Patient's last menstrual period was 05/04/2019 (approximate).  Allergies reviewed: Yes  Medications reviewed: Yes    Medications: Medication refills not needed today.  Pharmacy name entered into [employer]: [id number] - [place] - [address] AT [address]    Clinical concerns: none      Lucinda Reardon, EMT  11/28/2023

## 2023-12-05 ENCOUNTER — THERAPY VISIT (OUTPATIENT)
Dept: PHYSICAL THERAPY | Facility: CLINIC | Age: 58
End: 2023-12-05
Attending: PEDIATRICS
Payer: COMMERCIAL

## 2023-12-05 DIAGNOSIS — G89.29 CHRONIC PAIN OF BOTH SHOULDERS: Primary | ICD-10-CM

## 2023-12-05 DIAGNOSIS — M25.511 CHRONIC PAIN OF BOTH SHOULDERS: Primary | ICD-10-CM

## 2023-12-05 DIAGNOSIS — M25.512 CHRONIC PAIN OF BOTH SHOULDERS: Primary | ICD-10-CM

## 2023-12-05 PROCEDURE — 97110 THERAPEUTIC EXERCISES: CPT | Mod: GP | Performed by: PHYSICAL THERAPIST

## 2023-12-05 PROCEDURE — 97140 MANUAL THERAPY 1/> REGIONS: CPT | Mod: GP | Performed by: PHYSICAL THERAPIST

## 2023-12-17 ENCOUNTER — MEDICAL CORRESPONDENCE (OUTPATIENT)
Dept: HEALTH INFORMATION MANAGEMENT | Facility: CLINIC | Age: 58
End: 2023-12-17

## 2023-12-18 ENCOUNTER — OFFICE VISIT (OUTPATIENT)
Dept: NEUROLOGY | Facility: CLINIC | Age: 58
End: 2023-12-18
Attending: FAMILY MEDICINE
Payer: COMMERCIAL

## 2023-12-18 VITALS
SYSTOLIC BLOOD PRESSURE: 127 MMHG | HEART RATE: 69 BPM | TEMPERATURE: 97.3 F | DIASTOLIC BLOOD PRESSURE: 81 MMHG | BODY MASS INDEX: 21.09 KG/M2 | OXYGEN SATURATION: 100 % | WEIGHT: 108 LBS

## 2023-12-18 DIAGNOSIS — G40.909 SEIZURE DISORDER (H): ICD-10-CM

## 2023-12-18 DIAGNOSIS — G40.919 INTRACTABLE EPILEPSY WITHOUT STATUS EPILEPTICUS, UNSPECIFIED EPILEPSY TYPE (H): Primary | ICD-10-CM

## 2023-12-18 RX ORDER — LAMOTRIGINE 25 MG/1
TABLET ORAL
Qty: 200 TABLET | Refills: 3 | Status: SHIPPED | OUTPATIENT
Start: 2023-12-18

## 2023-12-18 ASSESSMENT — PAIN SCALES - GENERAL: PAINLEVEL: NO PAIN (0)

## 2023-12-18 NOTE — PATIENT INSTRUCTIONS
Impression:    Epilepsy     History of Spina bifida - as a child she underwent a number of orthopedic procedures culminating in a right BK amputation. She had a neurogenic bladder and ileostomy and ileal loop bladder.    Arnold-Chiari malformation.      Eatting disorder in remission     Right frontal infarct/stroke    History of MI/tachycardia     Discussion:   Mrs. Grissom is a left handed female With a history of spina bifida, right below the knee amputation, neurogenic bladder, ileostomy and ileal loop bladder surgery.  She started having seizures at the age of 21 at which time they were very infrequent.  She went many years in her 30s and 20s without seizures or seizure medications.  It seems after the age of 54, after onset of menopause her seizures have worsened.  She now has seizures once a year.    Prior EEG was normal with no epileptiform discharges or electrographic seizures identified.MRI of the brain is notable for remote focal infarct in the right frontal lobe with residual hemosiderin in that region and Chiari malformation  with no hydrocephalus.   She currently has spells in which she feels panic attack feeling and dizziness followed by staring and unresponsiveness which may last up to 8 minutes in duration.  I did review with her that it is possible she has smaller seizures that are unwitnessed or subclinical.  I also reviewed with her that seizures over time can impact memory, worsening anxiety, worsening depression, decreased quality of life, increased risk for bodily injury and falls.  For this reason it is best for us to treat with a seizure medication.  In the past she has had side effects to seizure medications.  I did review that lamotrigine is one of the seizure medications with the least side effects and the most used seizure drug in the world.  Lamotrigine may have a risk of Huffman-Jaleel syndrome which can be life-threatening, it occurs in 1 out of 1,000,000 patients.  Lamotrigine  result in cardiac arrhythmias which is rare, nausea, dizziness, double vision, or mood changes.  It is important to note the lamotrigine is used as a mood stabilizer and can help depression and anxiety symptoms.  She is agreeable to starting lamotrigine and we reviewed a slow titration dose that I would normally use.  We both agreed on using a low-dose medication and then increasing gradually in an effort to reduce or stop seizures.  She does need to repeat her MRI of the brain and I asked her to do a 24-hour ambulatory EEG.  She is agreeable to this plan of care.  I answered all her questions on this visit.      Plan :       START LAMOTRIGINE -      Times of Days           Medication Tablet Size Number of Tablets/Capsules Notes      Lamotrigine      7 AM after breakfast  (Morning)   6 PM after dinner  (Night)                  Week 1-2      12.5 mg  (1/2 tablet)      0 mg        Week 3-4       25 mg   (1 tablet)           Week 5-6      25 mg   (1 tablet)   25 mg   (1 tablet)       Week 7      50 mg   (2 tablets)   25 mg   (1 tablets)       Week 8     50 mg   (2 tablets)   50 mg   (2 tablets)       Week 9      75 mg   (3 tablets)   50 mg   (2 tablets)      Week  10   75 mg   (3 tablets)  75 mg   (3 tablets)     Week 11   100 mg   (4 tablets)  75 mg   (3 tablets)     Week 12   100 mg   (4 tablets)  100 mg   (4 tablets)     Lamotrigine IR Script: Week 1-2: lamotrigine 12.5 mg am, week 3-4: 25 mg morning. Week 5-6: 25 mg twice a day, week 7: 50 mg am and 25 mg pm. Week 8: 50 mg twice a day, week 9: 75 mg am and 50 mg pm, week 10: 75 mg am and 75 mg pm. week 11: 100 mg am and 75 mg pm. Week 12 - onward: 100 mg am and 100 mg pm.     No driving     MRI brain w/wo contrast  (she has kidneys, but, may get contrast)    24 hour ambulatory EEG - sleep 4 hours prior to EEG hook up.     Follow up with  physician assistant Maribel in 11-12 weeks  or Dr. Sanz, first available.  Please check on seizure frequency and side effects  to lamotrigine.  Check lamotrigine level on follow-up visit.    The patient received guidance on maintaining appropriate seizure precautions. [place]'s driving regulations were discussed, ensuring the patient's understanding of the following restrictions: They are prohibited from operating a motor vehicle within 3 months following any seizure or episode involving sudden unconsciousness or an inability to sit up. Furthermore, they are required to report their most recent seizure to the DMV within 30 days after the event. The patient was also advised to steer clear of activities that could potentially result in self-injury or harm to others within 3 months following any seizure with impaired awareness or impaired motor control. These activities encompass, but are not limited to, operating power tools, handling firearms, climbing ladders, trees, or engaging in activities at heights that pose a fall risk. They should refrain from operating power tools or heavy machinery and equipment. Additionally, the patient was counseled against swimming alone, and they should not bathe in any type of tub, including bathtubs, jacuzzis, or hot tubs, unless there is a responsible adult present nearby to provide assistance in the event of a seizure to prevent drowning. Lastly, they were advised not to work with or near hot surfaces such as stoves, ovens, or scalding hot water to minimize risks associated with impaired awareness or motor control during seizures.    Brielle Sanz MD     AMBULATORY EEG  I ordered an ambulatory EEG on this visit.  EEG test will allow us to look for seizure like activity. An ambulatory EEG is a take-home EEG.  You will have electrodes put on your scalp at the clinic and a head box that you can wear as a bag.  You will have to come to clinic on subsequent days to have EEG data downloaded and electrodes re-glued.     Day 0 - sleep deprive 2-4 hours - do not drive when sleep deprived   Ambulatory EEG day 1 -  sleep normal   Ambulatory EEG day 2 - take off EEG    ambulatory EEG options: 24 hour EEG. Day 1 come to [facility] and get EEG put on head and you may go home. Day 2: Come to clinic to have EEG leads removed.     You have to schedule an appointment for each visit. If you have a target spell or seizure press the event button and document what you felt or experienced. Please schedule this study at your convience. It is best to schedule a follow-up in person visit to review your EEG results and clinical implications.  If you are not able to come into the clinic for an appointment, I am happy to review your results over the phone.  Kindly, schedule the most appropriate phone visit for you and your schedule after the EEG is completed. If you have MyChart, I may also send you the results via [employer] messaging if appropriate.     If patient has a target spell of staring or shaking, jarvis record spell on video. Make sure to obtain consent for video recording. Review this video with Yvon at next visit. At the onset ask patient to remember a word, follow a simple command (i.e point to the ceiling or point to the door). After the spell is completed ask to recall the word that was given.     How to Contact [facility]:   Send my chart message on EPIC to Dr. Sanz OR Call [facility] [phone] to speak with staff for assistance     If you need paper work completed please contact our office and allow 2-3 weeks for turnaround time, if you do not hear from us, please send [employer] message again.     Brielle Sanz MD

## 2023-12-18 NOTE — LETTER
2023       RE: David Grissom  : 1965   MRN: [id number]        Dear Colleague,    Thank you for referring your patient, David Grissom, to the [facility] EPILEPSY CARE at [facility]. Please see a copy of my visit note below.    [facility] Epilepsy Care History and Physical       Patient:  David Grissom  :  1965   Age:  58 year old   Today's Office Visit:  2023    Referring Provider:    Arden Hampton DO  [address] [place] [address]    History of Present Illness:  David Grissom is 58 year old right handed who presents with history of seizures.  This is my first visit with Jovita.  She has a history of spina bifida, Arnold-Chiari malformation, focal onset epilepsy.  Seizure started age 21, she took tegretol from age [age], she got pregnant at age 29 and stopped tegertol. She had a seizure age 36, she was off seizure medications from age [age] with no seizures. She is not sure why she had seizures at age 36. Then she went without seizure again for many years on no medications. Then at age 54 her seizure returned and became more frequent. After age of 54 she has seizure once a year, she tired some other medications and did not continued them. Currently, she states she takes no antiepileptic drug, she has seizure once a year, she had side effects on many antiepileptic drug.   Medical record review shows her focal seizures started at the age of 21, early on seizures were attributed to eating disorder and she attributed seizures on to diet.  She was treated with Tegretol from 1987-.  She discontinued Tegretol when she got pregnant.  She believes her last seizure was .  She has tried Tegretol in the past with noncompliance, levetiracetam 500 mg twice a day because depression, oxcarbazepine cause pressure in her head, lamotrigine was never increased past 25 mg/day due to fear of  "rash.  Spell/Seizure type 1: she describes seizure as \" I get a aura of dizziness, panic attack feeling, then I black out\". She has no motor component. Seizure may progress to generalized tonic-clonic convulsion, she states he bit her lip last time. Last seizure was 7/2023. Frequency: once a year. Duration 7-8 minutes.    Epilepsy Risk Factors:  She has a history of spina bifida, Arnold-Chiari malformation.  Patient has no history of encephalitis/meningitis, she has right frontal stroke, no history of tumor, no history of traumatic brain injury.  The patient had a normal birth and delivery.  Walking was delay, language was not delay. No febrile seizures.  Son has seizure.     Current Outpatient Medications   Medication Sig Dispense Refill    carBAMazepine (TEGRETOL XR) 100 MG 12 hr tablet Take 100 mg by mouth daily      Multiple Vitamin (DAILY MULTIVITAMIN PO) Take  by mouth.      ORDER FOR DME Equipment being ordered: right prosthetic leg 1 Device 0    ORDER FOR DME Equipment being ordered: crutches 1 Device 0    vitamin   C (VITAMIN C) 250 MG tablet Take 250 mg by mouth daily.       Past AEDs:  Tegretol she had concerned about heat issues.   levetiracetam 500 mg twice a day because depression, oxcarbazepine cause pressure in her head, lamotrigine was never increased past 25 mg/day due to fear of rash.  Past medical history:  Spina bifida, Arnold-Chiari malformation. As a child she underwent a number of orthopedic procedures culminating in a right BK amputation.  She had a neurogenic bladder and ileostomy and ileal loop bladder. Eatting disorder which is well treated.       12/18/2023     1:44 PM   AED - ANTIEPILEPTIC DRUGS   carBAMazepine 100 mg Daily PO (100 MG TB12)          Patient-reported medication     Past Medical History:   Diagnosis Date    ASCUS with positive high risk HPV cervical 06/15/2017    type 16 & other HR HPV    Spina bifida (H)       Past Surgical History:   Procedure Laterality Date    GYN " SURGERY  1999    Unilateral oopherectomy    ORTHOPEDIC SURGERY      Multiple surgeries on back, etc.     UROLOGY PROCEDURE                         DATE:  1970    Urostomy     Family History   Problem Relation Age of Onset    Diabetes Mother     Hypertension Mother     Heart Disease Maternal Grandmother     Heart Disease Maternal Grandfather       Psycho-Social History: David Grissom currently lives with family. Her kids are ages 28 and 25.  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 Son has seizures.  She does not drive.  She has a glass of wine rarely. She finished ministry degree, grew up in [place], took some college glasses.   Driving:  Currently patient is:  Not driving.  Previous Evaluations for Epilepsy:    EEG results: 11/18/20  EEG Impression:  This is an essentially normal extended outpatient video EEG recorded in the awake, drowsy and asleep states.   There were no epileptiform abnormalities.   Of note, a normal EEG neither confirms nor refutes a diagnosis of epilepsy.      Head CT: 5/23/21  IMPRESSION:   1.  No acute intracranial process.   2.  Stable appearance of the lateral and third ventricles which are prominent in caliber and mildly dilated but with normal-appearing cerebral aqueduct and fourth ventricle. No evidence of periventricular interstitial edema, however.   3.  Stable Chiari I morphology.   4.  Findings suggestive of new acute right maxillary sinusitis.       MRI brain results: 04/2015  1. Presumed remote focal infarct in right frontal lobe with residual small amount of hemosiderin in abnormal contour of frontal horn of right lateral ventricle.  2. Chiari malformation with prominent third and lateral ventricles but no hydrocephalus.  3. No evidence for acute infarct, acute hemorrhage, or any focal mass lesions.    Exam:    /81 (BP Location: Right arm, Patient Position: Sitting, Cuff Size: Adult Regular)   Pulse 69   Temp 97.3  F (36.3  C)  (Temporal)   Wt 108 lb (49 kg)   LMP 05/04/2019 (Approximate)   SpO2 100%   BMI 21.09 kg/m       Wt Readings from Last 5 Encounters:   12/18/23 108 lb (49 kg)   11/28/23 116 lb 3.2 oz (52.7 kg)   10/16/23 108 lb (49 kg)   10/05/23 108 lb (49 kg)   09/11/23 110 lb (49.9 kg)       Alert, orientated, speech is fluent, face symmetric, no pronator drip, equal  strength, decreased sensation in left, right BKA with prosethic. Paraplegic gait with crutches.         Impression:    Epilepsy     History of Spina bifida - as a child she underwent a number of orthopedic procedures culminating in a right BK amputation. She had a neurogenic bladder and ileostomy and ileal loop bladder.    Arnold-Chiari malformation.      Eatting disorder in remission     Right frontal infarct/stroke    History of MI/tachycardia     Discussion:   Mrs. Grissom is a left handed female With a history of spina bifida, right below the knee amputation, neurogenic bladder, ileostomy and ileal loop bladder surgery.  She started having seizures at the age of 21 at which time they were very infrequent.  She went many years in her 30s and 20s without seizures or seizure medications.  It seems after the age of 54, after onset of menopause her seizures have worsened.  She now has seizures once a year.    Prior EEG was normal with no epileptiform discharges or electrographic seizures identified.MRI of the brain is notable for remote focal infarct in the right frontal lobe with residual hemosiderin in that region and Chiari malformation  with no hydrocephalus.   She currently has spells in which she feels panic attack feeling and dizziness followed by staring and unresponsiveness which may last up to 8 minutes in duration.  I did review with her that it is possible she has smaller seizures that are unwitnessed or subclinical.  I also reviewed with her that seizures over time can impact memory, worsening anxiety, worsening depression, decreased quality of  life, increased risk for bodily injury and falls.  For this reason it is best for us to treat with a seizure medication.  In the past she has had side effects to seizure medications.  I did review that lamotrigine is one of the seizure medications with the least side effects and the most used seizure drug in the world.  Lamotrigine may have a risk of Huffman-Jaleel syndrome which can be life-threatening, it occurs in 1 out of 1,000,000 patients.  Lamotrigine result in cardiac arrhythmias which is rare, nausea, dizziness, double vision, or mood changes.  It is important to note the lamotrigine is used as a mood stabilizer and can help depression and anxiety symptoms.  She is agreeable to starting lamotrigine and we reviewed a slow titration dose that I would normally use.  We both agreed on using a low-dose medication and then increasing gradually in an effort to reduce or stop seizures.  She does need to repeat her MRI of the brain and I asked her to do a 24-hour ambulatory EEG.  She is agreeable to this plan of care.  I answered all her questions on this visit.      Plan :       START LAMOTRIGINE -      Times of Days           Medication Tablet Size Number of Tablets/Capsules Notes      Lamotrigine      7 AM after breakfast  (Morning)   6 PM after dinner  (Night)                  Week 1-2      12.5 mg  (1/2 tablet)      0 mg        Week 3-4       25 mg   (1 tablet)           Week 5-6      25 mg   (1 tablet)   25 mg   (1 tablet)       Week 7      50 mg   (2 tablets)   25 mg   (1 tablets)       Week 8     50 mg   (2 tablets)   50 mg   (2 tablets)       Week 9      75 mg   (3 tablets)   50 mg   (2 tablets)      Week  10   75 mg   (3 tablets)  75 mg   (3 tablets)     Week 11   100 mg   (4 tablets)  75 mg   (3 tablets)     Week 12   100 mg   (4 tablets)  100 mg   (4 tablets)     Lamotrigine IR Script: Week 1-2: lamotrigine 12.5 mg am, week 3-4: 25 mg morning. Week 5-6: 25 mg twice a day, week 7: 50 mg am and 25 mg pm.  Week 8: 50 mg twice a day, week 9: 75 mg am and 50 mg pm, week 10: 75 mg am and 75 mg pm. week 11: 100 mg am and 75 mg pm. Week 12 - onward: 100 mg am and 100 mg pm.     No driving     MRI brain     24 hour ambulatory EEG     Follow up with  physician assistant Maribel in 11-12 weeks  or Dr. Sanz, first available.  Please check on seizure frequency and side effects to lamotrigine.  Check lamotrigine level on follow-up visit.    The patient received guidance on maintaining appropriate seizure precautions. [place]'s driving regulations were discussed, ensuring the patient's understanding of the following restrictions: They are prohibited from operating a motor vehicle within 3 months following any seizure or episode involving sudden unconsciousness or an inability to sit up. Furthermore, they are required to report their most recent seizure to the DMV within 30 days after the event.  The patient was also advised to steer clear of activities that could potentially result in self-injury or harm to others within 3 months following any seizure with impaired awareness or impaired motor control. These activities encompass, but are not limited to, operating power tools, handling firearms, climbing ladders, trees, or engaging in activities at heights that pose a fall risk. They should refrain from operating power tools or heavy machinery and equipment. Additionally, the patient was counseled against swimming alone, and they should not bathe in any type of tub, including bathtubs, jacuzzis, or hot tubs, unless there is a responsible adult present nearby to provide assistance in the event of a seizure to prevent drowning. Lastly, they were advised not to work with or near hot surfaces such as stoves, ovens, or scalding hot water to minimize risks associated with impaired awareness or motor control during seizures.    I spent 61 minutes in total today to provide comprehensive  medical care.   I spent 6 minutes writing the  note and placing orders.   I spent 4 minutes  reviewing the chart.     The rest of the time was spent with the patient in face to face interview. During this time key medical decisions were made with review of medical chart prior to visit, visit with patient, counseling/education, and post visit work, including documentation of note on the day of visit. I addressed all questions the patient/caregiver raised in regards to epilepsy or related medical questions.         Again, thank you for allowing me to participate in the care of your patient.      Sincerely,    Brielle Sanz MD

## 2023-12-18 NOTE — PROGRESS NOTES
"[facility] Epilepsy Care History and Physical       Patient:  David Grissom  :  1965   Age:  58 year old   Today's Office Visit:  2023    Referring Provider:    Arden Hampton DO  [address] [facility] [place] [id number]    History of Present Illness:  David Grissom is 58 year old right handed who presents with history of seizures.  This is my first visit with Jovita.  She has a history of spina bifida, Arnold-Chiari malformation, focal onset epilepsy.  Seizure started age 21, she took tegretol from age [age], she got pregnant at age 29 and stopped tegertol. She had a seizure age 36, she was off seizure medications from age [age] with no seizures. She is not sure why she had seizures at age 36. Then she went without seizure again for many years on no medications. Then at age 54 her seizure returned and became more frequent. After age of 54 she has seizure once a year, she tired some other medications and did not continued them. Currently, she states she takes no antiepileptic drug, she has seizure once a year, she had side effects on many antiepileptic drug.   Medical record review shows her focal seizures started at the age of 21, early on seizures were attributed to eating disorder and she attributed seizures on to diet.  She was treated with Tegretol from 1987-.  She discontinued Tegretol when she got pregnant.  She believes her last seizure was .  She has tried Tegretol in the past with noncompliance, levetiracetam 500 mg twice a day because depression, oxcarbazepine cause pressure in her head, lamotrigine was never increased past 25 mg/day due to fear of rash.  Spell/Seizure type 1: she describes seizure as \" I get a aura of dizziness, panic attack feeling, then I black out\". She has no motor component. Seizure may progress to generalized tonic-clonic convulsion, she states he bit her lip last time. Last seizure was 2023. Frequency: once a year. Duration 7-8 minutes.  "   Epilepsy Risk Factors:  She has a history of spina bifida, Arnold-Chiari malformation.  Patient has no history of encephalitis/meningitis, she has right frontal stroke, no history of tumor, no history of traumatic brain injury.  The patient had a normal birth and delivery.  Walking was delay, language was not delay. No febrile seizures.  Son has seizure.     Current Outpatient Medications   Medication Sig Dispense Refill     carBAMazepine (TEGRETOL XR) 100 MG 12 hr tablet Take 100 mg by mouth daily       Multiple Vitamin (DAILY MULTIVITAMIN PO) Take  by mouth.       ORDER FOR DME Equipment being ordered: right prosthetic leg 1 Device 0     ORDER FOR DME Equipment being ordered: crutches 1 Device 0     vitamin   C (VITAMIN C) 250 MG tablet Take 250 mg by mouth daily.       Past AEDs:  Tegretol she had concerned about heat issues.   levetiracetam 500 mg twice a day because depression, oxcarbazepine cause pressure in her head, lamotrigine was never increased past 25 mg/day due to fear of rash.  Past medical history:  Spina bifida, Arnold-Chiari malformation. As a child she underwent a number of orthopedic procedures culminating in a right BK amputation.  She had a neurogenic bladder and ileostomy and ileal loop bladder. Eatting disorder which is well treated.       12/18/2023     1:44 PM   AED - ANTIEPILEPTIC DRUGS   carBAMazepine 100 mg Daily PO (100 MG TB12)          Patient-reported medication     Past Medical History:   Diagnosis Date     ASCUS with positive high risk HPV cervical 06/15/2017    type 16 & other HR HPV     Spina bifida (H)       Past Surgical History:   Procedure Laterality Date     GYN SURGERY  1999    Unilateral oopherectomy     ORTHOPEDIC SURGERY      Multiple surgeries on back, etc.      UROLOGY PROCEDURE                         DATE:  1970    Urostomy     Family History   Problem Relation Age of Onset     Diabetes Mother      Hypertension Mother      Heart Disease Maternal Grandmother       Heart Disease Maternal Grandfather       Psycho-Social History: David Grissom currently lives with family. Her kids are ages 28 and 25.  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 Son has seizures.  She does not drive.  She has a glass of wine rarely. She finished ministry degree, grew up in [place], took some college glasses.   Driving:  Currently patient is:  Not driving.  Previous Evaluations for Epilepsy:    EEG results: 11/18/20  EEG Impression:  This is an essentially normal extended outpatient video EEG recorded in the awake, drowsy and asleep states.   There were no epileptiform abnormalities.   Of note, a normal EEG neither confirms nor refutes a diagnosis of epilepsy.      Head CT: 5/23/21  IMPRESSION:   1.  No acute intracranial process.   2.  Stable appearance of the lateral and third ventricles which are prominent in caliber and mildly dilated but with normal-appearing cerebral aqueduct and fourth ventricle. No evidence of periventricular interstitial edema, however.   3.  Stable Chiari I morphology.   4.  Findings suggestive of new acute right maxillary sinusitis.       MRI brain results: 04/2015  1. Presumed remote focal infarct in right frontal lobe with residual small amount of hemosiderin in abnormal contour of frontal horn of right lateral ventricle.  2. Chiari malformation with prominent third and lateral ventricles but no hydrocephalus.  3. No evidence for acute infarct, acute hemorrhage, or any focal mass lesions.    Exam:    /81 (BP Location: Right arm, Patient Position: Sitting, Cuff Size: Adult Regular)   Pulse 69   Temp 97.3  F (36.3  C) (Temporal)   Wt 108 lb (49 kg)   LMP 05/04/2019 (Approximate)   SpO2 100%   BMI 21.09 kg/m       Wt Readings from Last 5 Encounters:   12/18/23 108 lb (49 kg)   11/28/23 116 lb 3.2 oz (52.7 kg)   10/16/23 108 lb (49 kg)   10/05/23 108 lb (49 kg)   09/11/23 110 lb (49.9 kg)       Alert, orientated, speech is  fluent, face symmetric, no pronator drip, equal  strength, decreased sensation in left, right BKA with prosethic. Paraplegic gait with crutches.         Impression:    Epilepsy     History of Spina bifida - as a child she underwent a number of orthopedic procedures culminating in a right BK amputation. She had a neurogenic bladder and ileostomy and ileal loop bladder.    Arnold-Chiari malformation.      Eatting disorder in remission     Right frontal infarct/stroke    History of MI/tachycardia     Discussion:   Mrs. Grissom is a left handed female With a history of spina bifida, right below the knee amputation, neurogenic bladder, ileostomy and ileal loop bladder surgery.  She started having seizures at the age of 21 at which time they were very infrequent.  She went many years in her 30s and 20s without seizures or seizure medications.  It seems after the age of 54, after onset of menopause her seizures have worsened.  She now has seizures once a year.    Prior EEG was normal with no epileptiform discharges or electrographic seizures identified.MRI of the brain is notable for remote focal infarct in the right frontal lobe with residual hemosiderin in that region and Chiari malformation  with no hydrocephalus.   She currently has spells in which she feels panic attack feeling and dizziness followed by staring and unresponsiveness which may last up to 8 minutes in duration.  I did review with her that it is possible she has smaller seizures that are unwitnessed or subclinical.  I also reviewed with her that seizures over time can impact memory, worsening anxiety, worsening depression, decreased quality of life, increased risk for bodily injury and falls.  For this reason it is best for us to treat with a seizure medication.  In the past she has had side effects to seizure medications.  I did review that lamotrigine is one of the seizure medications with the least side effects and the most used seizure drug in  the world.  Lamotrigine may have a risk of Huffman-Jaleel syndrome which can be life-threatening, it occurs in 1 out of 1,000,000 patients.  Lamotrigine result in cardiac arrhythmias which is rare, nausea, dizziness, double vision, or mood changes.  It is important to note the lamotrigine is used as a mood stabilizer and can help depression and anxiety symptoms.  She is agreeable to starting lamotrigine and we reviewed a slow titration dose that I would normally use.  We both agreed on using a low-dose medication and then increasing gradually in an effort to reduce or stop seizures.  She does need to repeat her MRI of the brain and I asked her to do a 24-hour ambulatory EEG.  She is agreeable to this plan of care.  I answered all her questions on this visit.      Plan :       START LAMOTRIGINE -      Times of Days           Medication Tablet Size Number of Tablets/Capsules Notes      Lamotrigine      7 AM after breakfast  (Morning)   6 PM after dinner  (Night)                  Week 1-2      12.5 mg  (1/2 tablet)      0 mg        Week 3-4       25 mg   (1 tablet)           Week 5-6      25 mg   (1 tablet)   25 mg   (1 tablet)       Week 7      50 mg   (2 tablets)   25 mg   (1 tablets)       Week 8     50 mg   (2 tablets)   50 mg   (2 tablets)       Week 9      75 mg   (3 tablets)   50 mg   (2 tablets)      Week  10   75 mg   (3 tablets)  75 mg   (3 tablets)     Week 11   100 mg   (4 tablets)  75 mg   (3 tablets)     Week 12   100 mg   (4 tablets)  100 mg   (4 tablets)     Lamotrigine IR Script: Week 1-2: lamotrigine 12.5 mg am, week 3-4: 25 mg morning. Week 5-6: 25 mg twice a day, week 7: 50 mg am and 25 mg pm. Week 8: 50 mg twice a day, week 9: 75 mg am and 50 mg pm, week 10: 75 mg am and 75 mg pm. week 11: 100 mg am and 75 mg pm. Week 12 - onward: 100 mg am and 100 mg pm.     No driving     MRI brain     24 hour ambulatory EEG     Follow up with  physician assistant López in 11-12 weeks  or first Mini  available.  Please check on seizure frequency and side effects to lamotrigine.  Check lamotrigine level on follow-up visit.    The patient received guidance on maintaining appropriate seizure precautions. [place]'s driving regulations were discussed, ensuring the patient's understanding of the following restrictions: They are prohibited from operating a motor vehicle within 3 months following any seizure or episode involving sudden unconsciousness or an inability to sit up. Furthermore, they are required to report their most recent seizure to the DMV within 30 days after the event.  The patient was also advised to steer clear of activities that could potentially result in self-injury or harm to others within 3 months following any seizure with impaired awareness or impaired motor control. These activities encompass, but are not limited to, operating power tools, handling firearms, climbing ladders, trees, or engaging in activities at heights that pose a fall risk. They should refrain from operating power tools or heavy machinery and equipment. Additionally, the patient was counseled against swimming alone, and they should not bathe in any type of tub, including bathtubs, jacuzzis, or hot tubs, unless there is a responsible adult present nearby to provide assistance in the event of a seizure to prevent drowning. Lastly, they were advised not to work with or near hot surfaces such as stoves, ovens, or scalding hot water to minimize risks associated with impaired awareness or motor control during seizures.    I spent 61 minutes in total today to provide comprehensive  medical care.   I spent 6 minutes writing the note and placing orders.   I spent 4 minutes  reviewing the chart.     The rest of the time was spent with the patient in face to face interview. During this time key medical decisions were made with review of medical chart prior to visit, visit with patient, counseling/education, and post visit work,  including documentation of note on the day of visit. I addressed all questions the patient/caregiver raised in regards to epilepsy or related medical questions.       Brielle Sanz MD   Epilepsy Staff

## 2023-12-29 ENCOUNTER — THERAPY VISIT (OUTPATIENT)
Dept: PHYSICAL THERAPY | Facility: CLINIC | Age: 58
End: 2023-12-29
Payer: COMMERCIAL

## 2023-12-29 DIAGNOSIS — G89.29 CHRONIC PAIN OF BOTH SHOULDERS: Primary | ICD-10-CM

## 2023-12-29 DIAGNOSIS — M25.511 CHRONIC PAIN OF BOTH SHOULDERS: Primary | ICD-10-CM

## 2023-12-29 DIAGNOSIS — M25.512 CHRONIC PAIN OF BOTH SHOULDERS: Primary | ICD-10-CM

## 2023-12-29 PROCEDURE — 97110 THERAPEUTIC EXERCISES: CPT | Mod: GP | Performed by: PHYSICAL THERAPIST

## 2023-12-29 PROCEDURE — 97140 MANUAL THERAPY 1/> REGIONS: CPT | Mod: GP | Performed by: PHYSICAL THERAPIST

## 2024-01-08 ENCOUNTER — THERAPY VISIT (OUTPATIENT)
Dept: PHYSICAL THERAPY | Facility: CLINIC | Age: 59
End: 2024-01-08
Payer: COMMERCIAL

## 2024-01-08 ENCOUNTER — ANCILLARY PROCEDURE (OUTPATIENT)
Dept: MRI IMAGING | Facility: CLINIC | Age: 59
End: 2024-01-08
Attending: PSYCHIATRY & NEUROLOGY
Payer: COMMERCIAL

## 2024-01-08 DIAGNOSIS — G40.909 SEIZURE DISORDER (H): ICD-10-CM

## 2024-01-08 DIAGNOSIS — G89.29 CHRONIC PAIN OF BOTH SHOULDERS: Primary | ICD-10-CM

## 2024-01-08 DIAGNOSIS — M25.512 CHRONIC PAIN OF BOTH SHOULDERS: Primary | ICD-10-CM

## 2024-01-08 DIAGNOSIS — G40.919 INTRACTABLE EPILEPSY WITHOUT STATUS EPILEPTICUS, UNSPECIFIED EPILEPSY TYPE (H): ICD-10-CM

## 2024-01-08 DIAGNOSIS — M25.511 CHRONIC PAIN OF BOTH SHOULDERS: Primary | ICD-10-CM

## 2024-01-08 PROCEDURE — 70553 MRI BRAIN STEM W/O & W/DYE: CPT | Mod: TC | Performed by: RADIOLOGY

## 2024-01-08 PROCEDURE — A9585 GADOBUTROL INJECTION: HCPCS | Performed by: RADIOLOGY

## 2024-01-08 PROCEDURE — 97110 THERAPEUTIC EXERCISES: CPT | Mod: GP | Performed by: PHYSICAL THERAPIST

## 2024-01-08 PROCEDURE — 97140 MANUAL THERAPY 1/> REGIONS: CPT | Mod: GP | Performed by: PHYSICAL THERAPIST

## 2024-01-08 RX ORDER — GADOBUTROL 604.72 MG/ML
7.5 INJECTION INTRAVENOUS ONCE
Status: COMPLETED | OUTPATIENT
Start: 2024-01-08 | End: 2024-01-08

## 2024-01-08 RX ADMIN — GADOBUTROL 5 ML: 604.72 INJECTION INTRAVENOUS at 10:05

## 2024-01-24 NOTE — TELEPHONE ENCOUNTER
Records Requested     January 24, 2024 10:18 AM  [place] [address]   Facility  [facility]   Fax: [phone]    [facility]  Fax: [phone]   Outcome Urgent request faxed to [facility] for records.     Urgent request faxed to  to push images to PACS.     1/25/24 9:00 AM - Records received from [facility] and sent to urgent scanning. Xrays viewable in CE per .        REFERRAL INFORMATION:  Referring Provider:  Arnel Gannon MD  Referring Clinic:  [facility]   Reason for Visit/Diagnosis: K21.00 (ICD-10-CM) - Gastroesophageal reflux disease with esophagitis without hemorrhage     FUTURE VISIT INFORMATION:  Appointment Date: 1/26/24  Appointment Time: 8:30 AM      NOTES STATUS DETAILS   OFFICE NOTE from Referring Provider Internal 11/28/23 - ONC OV with Arnel Gannon MD    OFFICE NOTE from Other Specialist Care Everywhere 11/13/23 - UC visit with Donna Mejía Mohammed, PA-C at [facility]     10/5/23 - PCC OV with Courtney Pillai APRN CNP at [facility]     4/10/14, 4/1/13 - PCC OV with Naima Saul DO at [facility]     11/22/13 - PCC OV with Naheed Casanova MD at [facility]     5/29/12, 5/1/12 - PCC OV with King Todd MD at [facility]     5/5/12 - GI OV [facility]    HOSPITAL DISCHARGE SUMMARY/  ED VISITS Care Everywhere 11/13/23 - [facility] ED visit with Pedrito Moreira MD    MEDICATION LIST Internal         ENDOSCOPY  Received/ Care Everywhere Critical access hospital:  12/6/12 - EGD     [facility]:  4/15/10 - EGD   4/15/10 - Bravo pH Study   COLONOSCOPY Received [facility]:  6/1/16   STOOL TESTING Care Everywhere C. Diff Toxin - 4/25/23   PERTINENT LABS Internal/ Care Everywhere 11/13/23 - BMP; CBC/diff; potassium; troponin   PATHOLOGY REPORTS (RELATED) Care Everywhere 6/1/16 - Colon bx      IMAGING (CT, MRI, EGD, MRCP, Small Bowel Follow Through/SBT, MR/CT Enterography) PACS/CE  Internal:   CT Chest - 11/22/23    [facility]:   XR Chest - 11/13/23, 9/4/23, 7/4/23, 10/16/22

## 2024-01-26 ENCOUNTER — OFFICE VISIT (OUTPATIENT)
Dept: GASTROENTEROLOGY | Facility: CLINIC | Age: 59
End: 2024-01-26
Attending: THORACIC SURGERY (CARDIOTHORACIC VASCULAR SURGERY)
Payer: COMMERCIAL

## 2024-01-26 ENCOUNTER — PRE VISIT (OUTPATIENT)
Dept: GASTROENTEROLOGY | Facility: CLINIC | Age: 59
End: 2024-01-26

## 2024-01-26 VITALS
SYSTOLIC BLOOD PRESSURE: 125 MMHG | HEIGHT: 60 IN | WEIGHT: 108 LBS | DIASTOLIC BLOOD PRESSURE: 62 MMHG | HEART RATE: 62 BPM | BODY MASS INDEX: 21.2 KG/M2 | OXYGEN SATURATION: 99 %

## 2024-01-26 DIAGNOSIS — R07.89 ATYPICAL CHEST PAIN: Primary | ICD-10-CM

## 2024-01-26 DIAGNOSIS — R13.19 ESOPHAGEAL DYSPHAGIA: ICD-10-CM

## 2024-01-26 DIAGNOSIS — K21.00 GASTROESOPHAGEAL REFLUX DISEASE WITH ESOPHAGITIS WITHOUT HEMORRHAGE: ICD-10-CM

## 2024-01-26 PROCEDURE — 99204 OFFICE O/P NEW MOD 45 MIN: CPT | Performed by: PHYSICIAN ASSISTANT

## 2024-01-26 ASSESSMENT — PAIN SCALES - GENERAL: PAINLEVEL: NO PAIN (0)

## 2024-01-26 NOTE — NURSING NOTE
Chief Complaint   Patient presents with    New Patient       Vitals:    01/26/24 0832   BP: 125/62   Pulse: 62   SpO2: 99%   Weight: 49 kg (108 lb)   Height: 1.524 m (5')       Body mass index is 21.09 kg/m .    Sharmila Juares

## 2024-01-26 NOTE — PATIENT INSTRUCTIONS
It was a pleasure taking care of you today.  I've included a brief summary of our discussion and care plan from today's visit below.  Please review this information with your primary care provider.  _______________________________________________________________________    My recommendations are summarized as follows:    - Reflux friendly lifestyle modifications as directed in AVS   - Upper endoscopy with biopsies of the upper and lower esophagus as well as 96 hour pH testing (BRAVO study) to be completed off acid suppressive therapy.       Gastroesophageal Reflux Disease (GERD) Lifestyle Modifications:   If taking acid suppression therapy (PPI ie Pantoprazole, Lansoprazole, Omeprazole, Esomeprazole, Rabeprazole, Dexlansoprazole) it should be taken 30 - 60 minutes prior to meals on an empty stomach to have maximum effect  Avoid triggers for reflux such as coffee, chocolate, carbonated beverages, spicy foods, acidic foods (tomato based/citrus and foods with high fat content   Abstinence from alcohol and cessation of all tobacco products is recommended   Studies have shown that weight loss, exercise and maintaining a healthy BMI significantly reduce GERD symptoms   Remain upright while eating and immediately after meals  Do not eat or drink at least 3 hours prior to laying down supine/laying down for bed   Avoid late night/middle of the night snacking    Consider obtaining a wedge pillow or elevating the head end of the bed while sleeping   Avoid sleeping right side down as this can place the lower part of the esophagus/lower esophageal sphincter in a dependent position that favors reflux   Attempting to eat smaller more frequent meals may improve symptoms       To schedule endoscopic procedures you may call: [phone]  To schedule radiology (imaging) tests you may call: [phone]  To schedule an ENT appointment you may call: [phone]    Please call my nurse Dhara ([phone]), Elijah ([phone]) with  any questions or concerns.      Return to GI Clinic in 6 months to review your progress.    _______________________________________________________________________    Who do I call with any questions after my visit?  Please be in touch if there are any further questions that arise following today's visit.  There are multiple ways to contact your gastroenterology care team.      During business hours, you may reach a Gastroenterology nurse at [phone] and choose option 3.       To schedule or reschedule an appointment, please call [phone].     You can always send a secure message through [employer].  [employer] messages are answered by your nurse or doctor typically within 24 hours.  Please allow extra time on weekends and holidays.      For urgent/emergent questions after business hours, you may reach the on-call GI Fellow by contacting the [facility]  at [phone].     How will I get the results of any tests ordered?    You will receive all of your results.  If you have signed up for [employer], any tests ordered at your visit will be available to you after your physician reviews them.  Typically this takes 1-2 weeks.  If there are urgent results that require a change in your care plan, your physician or nurse will call you to discuss the next steps.      What is [employer]?  [employer] is a secure way for you to access all of your healthcare records from the [facility].  It is a web based computer program, so you can sign on to it from any location.  It also allows you to send secure messages to your care team.  I recommend signing up for [employer] access if you have not already done so and are comfortable with using a computer.      How to I schedule a follow-up visit?  If you did not schedule a follow-up visit today, please call [phone] to schedule a follow-up office visit.      If you feel you received exceptional care and are interested in supporting the clinical and research  goals of Raquel Jasmine PA-C or the Division of Gastroenterology, Hepatology, and Nutrition please contact [email or web address] from the [facility] to discuss opportunities to donate.    Sincerely,    Raquel Jasmine PA-C  Division of Gastroenterology, Hepatology, and Nutrition  [facility]

## 2024-01-26 NOTE — LETTER
1/26/2024         RE: Davdi Grissom  [address] [address]        Dear Colleague,    Thank you for referring your patient, David Grissom, to the [facility] [place]. Please see a copy of my visit note below.    Gastroenterology Visit for: David Grissom 1965   MRN: [id number]     Reason for Visit:  chief complaint    Referred by: Teressa  / Belen RODRIGUEZ / [facility] [id number]  Patient Care Team:  Naima Saul DO as PCP - General (Family Practice)  Ned Hunt MD as Referring Physician (Physical Medicine and Rehabilitation)  Young Husain MD as MD (Family Practice)  Selena Falcon MD as Assigned Musculoskeletal Provider  Courtney Pillai APRN CNP as Assigned PCP  Arnel Gannon MD as MD (Cardiovascular & Thoracic Surgery)  Raquel Jasmine PA-C as Physician Assistant (Gastroenterology)  Arnel Gannon MD as Assigned Heart and Vascular Provider  Brielle Sanz MD as Assigned Neuroscience Provider    History of Present Illness:   David Grissom is a 58 year old female with significant past medical history pertinent for spina bifida status post ileal conduit creation as a child complicated recently by hydronephrosis requiring cystolitholopaxy, seizure disorder, NSTEMI, stress-induced cardiomyopathy, nontoxic multinodular goiter, history of leg amputation who is presenting as a new patient in consultation at the request of Dr. Gannon with a chief complaint of substernal chest pain.    Today Arminda reports that she struggles with reflux which she explains that then turns into panic attacks.  Noting that her symptoms have brought her to the ER multiple times for additional evaluation.  The symptoms are predominantly occurring prior to bed in the evening hours/when laying down flat and prevents her from falling asleep. Explaining that she can experience both heartburn/regurgitation during this time. Noting this is  "occurring once per month and then can last for 4-5 days at a time. During the multiple day span she reports that the pain \"never subsides\" and does not change in characteristic throughout. The pain is non radiating and located substernally. She denies radiation to the back, arms, jaw and neck.  She does report associated impact to her blood pressure and heart rate. Denies water brash/dysgeusia.  She does report some dietary association and trigger foods include coffee, spicy foods.  With avoidance of these foods symptoms can be prevented.  Additional aggravating factors include prolonged talking. She is not taking Prevacid/Prilosec as directed per cardiothoracic surgery.  She has trialed TUMs in the past without relief.  During the most recent episode she took Prilosec with mild relief in symptoms.     Additionally she reports symptoms of solid dysphagia (meats/vegetables) onset approximately 1 year prior which have been stable. Symptoms are vague however are occurring daily?    Associated with voice hoarseness.     As for her bowel patterns she stools once every 2-3 days without difficulty consistent with Martin Stool Scale Type 4.     Denies weight loss, nausea, emesis, odynophagia, chronic cough/throat clearing, abdominal pain, diarrhea, constipation (< 3 stools per week), nocturnal stooling, incontinence of feces, melena, hematochezia and BRBR.     No family history or GI related malignancy (esophageal, gastric, pancreatic, liver or colon).      No allergy Nickel     Esophageal Questionnaire(s)    BEDQ Questionnaire      1/19/2024     1:28 PM   BEDQ Questionnaire: How Often Have You Had the Following?   Trouble eating solid food (meat, bread, vegetables) 4   Trouble eating soft foods (yogurt, jello, pudding) 1   Trouble swallowing liquids 0   Pain while swallowing 2   Coughing or choking while swallowing foods or liquids 0   Total Score: 7         1/19/2024     1:28 PM   BEDQ Questionnaire: Discomfort/Pain " Ratings   Eating solid food (meat, bread, vegetables) 4   Eating soft foods (yogurt, jello, pudding) 2   Drinking liquid 1   Total Score: 7     Eckardt Questionnaire      1/19/2024     1:28 PM   Eckardt Questionnaire   Dysphagia 1   Regurgitation 1   Retrosternal Pain 1   Weight Loss (kg) 2   Total Score:  5     Promis 10 Questionnaire      1/19/2024     1:29 PM   PROMIS 10 FLOWSHEET DATA   In general, would you say your health is: 3   In general, would you say your quality of life is: 3   In general, how would you rate your physical health? 3   In general, how would you rate your mental health, including your mood and your ability to think? 3   In general, how would you rate your satisfaction with your social activities and relationships? 3   In general, please rate how well you carry out your usual social activities and roles. (This includes activities at home, at work and in your community, and responsibilities as a parent, child, spouse, employee, friend, etc.) 3   To what extent are you able to carry out your everyday physical activities such as walking, climbing stairs, carrying groceries, or moving a chair? 3   In the past 7 days, how often have you been bothered by emotional problems such as feeling anxious, depressed, or irritable? 3   In the past 7 days, how would you rate your fatigue on average? 3   In the past 7 days, how would you rate your pain on average, where 0 means no pain, and 10 means worst imaginable pain? 5   Mental health question re-calculation - no clinical value 3   Physical health question re-calculation - no clinical value 3   Pain question re-calculation - no clinical value 3   Global Mental Health Score 12   Global Physical Health Score 12   PROMIS TOTAL - SUBSCORES 24     STUDIES & PROCEDURES:    EGD:       Colonoscopy:     EndoFLIP directed at the UES or LES (8cm (EF-325) balloon length or 16cm (EF-322) balloon length):   Date:  8cm balloon  Balloon inflation Balloon pressure CSA  (mm^2) DI (mm^2/mmHg) Dmin (mm) Compliance   20 (ladmark ID)        30        40        50           16cm balloon  Balloon inflation Balloon pressure CSA (mm^2) DI (mm^2/mmHg) Dmin (mm) Compliance   30 (ladmark ID)        40        50        60        70           High Resolution Manometry:    PH/Impedance:     Bravo:    CT:    Esophagram:    FL VSS:     GES:    U/S:     XRAY:    Other:       Prior medical records were reviewed including, but not limited to, notes from referring providers, lab work, radiographic tests, and other diagnostic tests. Pertinent results were summarized above.     History     Past Medical History:   Diagnosis Date    ASCUS with positive high risk HPV cervical 06/15/2017    type 16 & other HR HPV    Spina bifida (H)        Past Surgical History:   Procedure Laterality Date    GYN SURGERY      Unilateral oopherectomy    ORTHOPEDIC SURGERY      Multiple surgeries on back, etc.     UROLOGY PROCEDURE                         DATE:      Urostomy       Social History     Socioeconomic History    Marital status:      Spouse name: Not on file    Number of children: Not on file    Years of education: Not on file    Highest education level: Not on file   Occupational History    Occupation: PCA     Employer: [employer]   Tobacco Use    Smoking status: Former     Types: Cigarettes     Quit date: 1987     Years since quittin.0    Smokeless tobacco: Never   Vaping Use    Vaping Use: Never used   Substance and Sexual Activity    Alcohol use: Yes     Alcohol/week: 0.0 - 1.0 standard drinks of alcohol     Comment: occasional    Drug use: No    Sexual activity: Yes     Partners: Male   Other Topics Concern    Parent/sibling w/ CABG, MI or angioplasty before 65F 55M? No   Social History Narrative    Not on file     Social Determinants of Health     Financial Resource Strain: Low Risk  (10/2/2023)    Financial Resource Strain     Within the past 12 months, have you or  your family members you live with been unable to get utilities (heat, electricity) when it was really needed?: No   Food Insecurity: Low Risk  (10/2/2023)    Food Insecurity     Within the past 12 months, did you worry that your food would run out before you got money to buy more?: No     Within the past 12 months, did the food you bought just not last and you didn t have money to get more?: No   Transportation Needs: High Risk (10/2/2023)    Transportation Needs     Within the past 12 months, has lack of transportation kept you from medical appointments, getting your medicines, non-medical meetings or appointments, work, or from getting things that you need?: Yes   Physical Activity: Not on file   Stress: Not on file   Social Connections: Not on file   Interpersonal Safety: Not on file   Housing Stability: Low Risk  (10/2/2023)    Housing Stability     Do you have housing? : Yes     Are you worried about losing your housing?: No       Family History   Problem Relation Age of Onset    Diabetes Mother     Hypertension Mother     Heart Disease Maternal Grandmother     Heart Disease Maternal Grandfather      Family history reviewed and edited as appropriate    Medications and Allergies:     Outpatient Encounter Medications as of 1/26/2024   Medication Sig Dispense Refill    carBAMazepine (TEGRETOL XR) 100 MG 12 hr tablet Take 100 mg by mouth daily      lamoTRIgine (LAMICTAL) 25 MG tablet Lamotrigine IR Script: Week 1-2: lamotrigine 12.5 mg am, week 3-4: 25 mg morning. Week 5-6: 25 mg twice a day, week 7: 50 mg am and 25 mg pm. Week 8: 50 mg twice a day, week 9: 75 mg am and 50 mg pm, week 10: 75 mg am and 75 mg pm. week 11: 100 mg am and 75 mg pm. Week 12 - onward: 100 mg am and 100 mg pm. 200 tablet 3    Multiple Vitamin (DAILY MULTIVITAMIN PO) Take  by mouth.      ORDER FOR DME Equipment being ordered: right prosthetic leg 1 Device 0    ORDER FOR DME Equipment being ordered: crutches 1 Device 0    vitamin   C  (VITAMIN C) 250 MG tablet Take 250 mg by mouth daily.       No facility-administered encounter medications on file as of 1/26/2024.        Allergies   Allergen Reactions    Ciprofloxacin Other (See Comments) and Unknown     Seizures    Seizure reaction not confirmed but suspected    Cephalosporins Other (See Comments)    Latex Other (See Comments) and Swelling     If Latex touches her face, her face swells up.        Review of systems:  A full 10 point review of systems was obtained and was negative except for the pertinent positives and negatives stated within the HPI.    Objective Findings:   Physical Exam:    Constitutional: LMP 05/04/2019 (Approximate)   General: Alert, cooperative, no distress, well-appearing  Head: Atraumatic, normocephalic, no obvious abnormalities   Eyes: Sclera anicteric, no obvious conjunctival hemorrhage   Nose: Nares normal, no obvious malformation, no obvious rhinorrhea   Respiratory: Resting comfortably, no apparent distress, no cough.   Gastrointestinal: Flat non distended   Skin: No jaundice, no obvious rash  Neurologic: AAOx3, no obvious neurologic abnormality  Psychiatric: Normal Affect, appropriate mood  Extremities: No obvious edema, no obvious malformation     Labs, Radiology, Pathology     Lab Results   Component Value Date    WBC 5.7 10/05/2023    WBC 6.5 11/22/2013    HGB 12.6 10/05/2023    HGB 13.7 11/22/2013    HGB 14.0 03/27/2012     10/05/2023     11/22/2013    CHOL 178 10/05/2023    CHOL 177 04/18/2016    CHOL 159 04/16/2015    TRIG 58 10/05/2023    TRIG 76 04/18/2016    TRIG 58 04/16/2015    HDL 69 10/05/2023    HDL 74 04/18/2016    HDL 69 04/16/2015    ALT 16 10/05/2023    AST 30 10/05/2023     10/05/2023     06/15/2017     04/18/2016    BUN 13.3 10/05/2023    BUN 15 06/15/2017    BUN 9 04/18/2016    CO2 25 10/05/2023    CO2 26 06/15/2017    CO2 30 04/18/2016    TSH 1.19 06/15/2017    TSH 1.11 11/22/2013    TSH 1.059 03/27/2012         Liver Function Studies -   Recent Labs   Lab Test 10/05/23  1038   PROTTOTAL 6.5   ALBUMIN 4.0   BILITOTAL 0.4   ALKPHOS 71   AST 30   ALT 16        Patient Active Problem List    Diagnosis Date Noted    Spina bifida (H) 04/01/2013     Priority: High     Right artificial leg, s/p BHA due to a surgical complication. Urostomy bag also. Occasional fecal leakage.            Chronic pain of both shoulders 11/20/2023     Priority: Medium    Chronic bilateral low back pain without sciatica 07/03/2017     Priority: Medium    ASCUS with positive high risk HPV cervical 06/17/2017     Priority: Medium     3/27/12: ASCUS Pap, Neg HPV  4/1/13: NIL Pap  6/10/14: NIL Pap, Neg HPV  6/15/17: ASCUS Pap, + HR HPV 16 & other HR HPV. Plan colp  7/10/17 [place] ECC neg. Plan: cotest in 1 year.  9/27/18 NIL pap, Neg HR HPV. Plan cotest in 3 years.   10/5/23 NIL pap, neg HPV. Plan: cotest in 3 years      History of leg amputation (H) 04/15/2014     Priority: Medium    Attention to urostomy (H) 04/15/2014     Priority: Medium    Nontoxic multinodular goiter 12/05/2013     Priority: Medium    Thyroid nodule 11/25/2013     Priority: Medium    Seizure disorder (H) 04/01/2013     Priority: Medium     Resolved.  Thought to be related to Cipro.        CARDIOVASCULAR SCREENING; LDL GOAL LESS THAN 130 04/01/2013     Priority: Low    History of recurrent UTIs 04/01/2013     Priority: Low     She has a urostomy         Assessment and Plan   Assessment/Plan:   David Grissom is a 58 year old female with significant past medical history pertinent for spine bifida, seizure disorder, NSTEMI, stress-induced cardiomyopathy, nontoxic multinodular goiter, history of leg amputation who is presenting as a new patient in consultation at the request of Dr. Gannon with a chief complaint of substernal chest pain.    #Atypical Chest Pain  #GERD  Patient had admission July 2023 found to have NSTEMI in the setting of stress-induced cardiomyopathy  (Echocardiogram with decreased left ventricular ejection fraction 35-40%) in the setting of seizure. She then underwent coronary angiogram which was unremarkable.  Subsequent echocardiogram 9/11/2023 with improvement in EF.  Per patient reports she is scheduled to follow-up with cardiology in early February.     Today Arminda presents with chronic symptoms of heartburn/regurgitation and substernal chest pain which have prompted her to seek emergent evaluation multiple times in the past 6 months.  Her symptoms occur in the late evening hours and after laying down for bed.  Denying symptoms of both heartburn and regurgitation occurring throughout the day or postprandially.  Noting this will start with heartburn and regurgitation and which she describes as turning into a panic attack.  She then develops substernal chest pain that is nonradiating and can last for 4 to 5 days at a time without change in characteristic/severity.  Between episodes she is overall asymptomatic however during these periods she also experiences solid dysphagia onset approximately 1 year prior.  She denies dysphagia to semisolids, liquids and pills. Previously she has trialed Tums without relief and during her last episode trialed Prilosec with mild improvement in symptoms.  Additional triggers include consumption of coffee/spicy foods and prolonged talking.      Differential includes reflux disease however alternative sources such as panic attacks should be considered with presentation.  She has had appropriate cardiac evaluation with negative coronary angiogram and follows with cardiology in early February per her reports.  Additional etiologies such as structural abnormalities of the esophagus, UES dysfunction and intrinsic motility disorder of the esophagus should also be considered.  Patient is agreeable to undergo upper endoscopy with 96 hour BRAVO study.  She wishes to defer initiation of acid suppressive therapy today and will continue to  use Tums as needed. If negative would then consider high-resolution manometry study vs time barium esophagram with tablet.   - Reflux friendly lifestyle modifications as directed in AVS   - Upper endoscopy with biopsies of the upper and lower esophagus as well as 96 hour pH testing (BRAVO study) to be completed off acid suppressive therapy.   - Future considerations would be consultation to behavioral health psychology however for the time being patient should return to primary care provider to discuss concerns for underlying anxiety which may be also contributing to symptoms    #Colorectal Cancer Screening   Colonoscopy 6/2016 that was unremarkable. No family history of CRC . Per the most recent update by the [place] Multi-Society Task Force on Colorectal Cancer recall should be 10 years, 2026 or sooner if otherwise indicated.     Follow up plan:   Return to clinic 4 - 6 months and as needed.    The risks and benefits of my recommendations, as well as other treatment options were discussed with the patient and any available family today. All questions were answered.     Follow up: As planned above. Today, I personally spent 37 minutes in direct face to face time with the patient, of which greater than 50% of the time was spent in patient education and counseling as described above. Approximately 13 minutes were spent on indirect care associated with the patient's consultation including but not limited to review of: patient medical records to date, clinic visits, hospital records, lab results, imaging studies, procedural documentation, and coordinating care with other providers. The findings from this review are summarized in the above note. All of the above accounted for a cumulative time of 50 minutes and was performed on the date of service.     The patient verbalized understanding of the plan and was appreciative for the time spent and information provided during the office visit.     Documentation assisted by voice  recognition and documentation system.        Again, thank you for allowing me to participate in the care of your patient.      Sincerely,    Raquel Jasmine PA-C

## 2024-01-26 NOTE — PROGRESS NOTES
"Gastroenterology Visit for: Dvaid Grissom 1965   MRN: [id number]     Reason for Visit:  chief complaint    Referred by: Teressa  / Belen SALGADO JENNIFER / [facility] [address]  Patient Care Team:  Naima Saul DO as PCP - General (Family Practice)  Ned Hunt MD as Referring Physician (Physical Medicine and Rehabilitation)  Young Husain MD as MD (Family Practice)  Selena Falcon MD as Assigned Musculoskeletal Provider  Courtney Pillai APRN CNP as Assigned PCP  Arnel Gannon MD as MD (Cardiovascular & Thoracic Surgery)  Raquel Jasmine PA-C as Physician Assistant (Gastroenterology)  Arnel Gannon MD as Assigned Heart and Vascular Provider  Brielle Sanz MD as Assigned Neuroscience Provider    History of Present Illness:   David Grissom is a 58 year old female with significant past medical history pertinent for spina bifida status post ileal conduit creation as a child complicated recently by hydronephrosis requiring cystolitholopaxy, seizure disorder, NSTEMI, stress-induced cardiomyopathy, nontoxic multinodular goiter, history of leg amputation who is presenting as a new patient in consultation at the request of Dr. Gannon with a chief complaint of substernal chest pain.    Today Arminda reports that she struggles with reflux which she explains that then turns into panic attacks.  Noting that her symptoms have brought her to the ER multiple times for additional evaluation.  The symptoms are predominantly occurring prior to bed in the evening hours/when laying down flat and prevents her from falling asleep. Explaining that she can experience both heartburn/regurgitation during this time. Noting this is occurring once per month and then can last for 4-5 days at a time. During the multiple day span she reports that the pain \"never subsides\" and does not change in characteristic throughout. The pain is non radiating and located substernally. She denies radiation to the back, " arms, jaw and neck.  She does report associated impact to her blood pressure and heart rate. Denies water brash/dysgeusia.  She does report some dietary association and trigger foods include coffee, spicy foods.  With avoidance of these foods symptoms can be prevented.  Additional aggravating factors include prolonged talking. She is not taking Prevacid/Prilosec as directed per cardiothoracic surgery.  She has trialed TUMs in the past without relief.  During the most recent episode she took Prilosec with mild relief in symptoms.     Additionally she reports symptoms of solid dysphagia (meats/vegetables) onset approximately 1 year prior which have been stable. Symptoms are vague however are occurring daily?    Associated with voice hoarseness.     As for her bowel patterns she stools once every 2-3 days without difficulty consistent with O'Brien Stool Scale Type 4.     Denies weight loss, nausea, emesis, odynophagia, chronic cough/throat clearing, abdominal pain, diarrhea, constipation (< 3 stools per week), nocturnal stooling, incontinence of feces, melena, hematochezia and BRBR.     No family history or GI related malignancy (esophageal, gastric, pancreatic, liver or colon).      No allergy Nickel       Esophageal Questionnaire(s)    BEDQ Questionnaire      1/19/2024     1:28 PM   BEDQ Questionnaire: How Often Have You Had the Following?   Trouble eating solid food (meat, bread, vegetables) 4   Trouble eating soft foods (yogurt, jello, pudding) 1   Trouble swallowing liquids 0   Pain while swallowing 2   Coughing or choking while swallowing foods or liquids 0   Total Score: 7         1/19/2024     1:28 PM   BEDQ Questionnaire: Discomfort/Pain Ratings   Eating solid food (meat, bread, vegetables) 4   Eating soft foods (yogurt, jello, pudding) 2   Drinking liquid 1   Total Score: 7       Eckardt Questionnaire      1/19/2024     1:28 PM   Eckardt Questionnaire   Dysphagia 1   Regurgitation 1   Retrosternal Pain 1    Weight Loss (kg) 2   Total Score:  5       Promis 10 Questionnaire      1/19/2024     1:29 PM   PROMIS 10 FLOWSHEET DATA   In general, would you say your health is: 3   In general, would you say your quality of life is: 3   In general, how would you rate your physical health? 3   In general, how would you rate your mental health, including your mood and your ability to think? 3   In general, how would you rate your satisfaction with your social activities and relationships? 3   In general, please rate how well you carry out your usual social activities and roles. (This includes activities at home, at work and in your community, and responsibilities as a parent, child, spouse, employee, friend, etc.) 3   To what extent are you able to carry out your everyday physical activities such as walking, climbing stairs, carrying groceries, or moving a chair? 3   In the past 7 days, how often have you been bothered by emotional problems such as feeling anxious, depressed, or irritable? 3   In the past 7 days, how would you rate your fatigue on average? 3   In the past 7 days, how would you rate your pain on average, where 0 means no pain, and 10 means worst imaginable pain? 5   Mental health question re-calculation - no clinical value 3   Physical health question re-calculation - no clinical value 3   Pain question re-calculation - no clinical value 3   Global Mental Health Score 12   Global Physical Health Score 12   PROMIS TOTAL - SUBSCORES 24           STUDIES & PROCEDURES:    EGD:       Colonoscopy:     EndoFLIP directed at the UES or LES (8cm (EF-325) balloon length or 16cm (EF-322) balloon length):   Date:  8cm balloon  Balloon inflation Balloon pressure CSA (mm^2) DI (mm^2/mmHg) Dmin (mm) Compliance   20 (ladmark ID)        30        40        50           16cm balloon  Balloon inflation Balloon pressure CSA (mm^2) DI (mm^2/mmHg) Dmin (mm) Compliance   30 (ladmark ID)        40        50        60        70            High Resolution Manometry:    PH/Impedance:     Bravo:    CT:    Esophagram:    FL VSS:     GES:    U/S:     XRAY:    Other:       Prior medical records were reviewed including, but not limited to, notes from referring providers, lab work, radiographic tests, and other diagnostic tests. Pertinent results were summarized above.     History     Past Medical History:   Diagnosis Date     ASCUS with positive high risk HPV cervical 06/15/2017    type 16 & other HR HPV     Spina bifida (H)        Past Surgical History:   Procedure Laterality Date     GYN SURGERY      Unilateral oopherectomy     ORTHOPEDIC SURGERY      Multiple surgeries on back, etc.      UROLOGY PROCEDURE                         DATE:      Urostomy       Social History     Socioeconomic History     Marital status:      Spouse name: Not on file     Number of children: Not on file     Years of education: Not on file     Highest education level: Not on file   Occupational History     Occupation: PCA     Employer: [employer]   Tobacco Use     Smoking status: Former     Types: Cigarettes     Quit date: 1987     Years since quittin.0     Smokeless tobacco: Never   Vaping Use     Vaping Use: Never used   Substance and Sexual Activity     Alcohol use: Yes     Alcohol/week: 0.0 - 1.0 standard drinks of alcohol     Comment: occasional     Drug use: No     Sexual activity: Yes     Partners: Male   Other Topics Concern     Parent/sibling w/ CABG, MI or angioplasty before 65F 55M? No   Social History Narrative     Not on file     Social Determinants of Health     Financial Resource Strain: Low Risk  (10/2/2023)    Financial Resource Strain      Within the past 12 months, have you or your family members you live with been unable to get utilities (heat, electricity) when it was really needed?: No   Food Insecurity: Low Risk  (10/2/2023)    Food Insecurity      Within the past 12 months, did you worry that your food would  run out before you got money to buy more?: No      Within the past 12 months, did the food you bought just not last and you didn t have money to get more?: No   Transportation Needs: High Risk (10/2/2023)    Transportation Needs      Within the past 12 months, has lack of transportation kept you from medical appointments, getting your medicines, non-medical meetings or appointments, work, or from getting things that you need?: Yes   Physical Activity: Not on file   Stress: Not on file   Social Connections: Not on file   Interpersonal Safety: Not on file   Housing Stability: Low Risk  (10/2/2023)    Housing Stability      Do you have housing? : Yes      Are you worried about losing your housing?: No       Family History   Problem Relation Age of Onset     Diabetes Mother      Hypertension Mother      Heart Disease Maternal Grandmother      Heart Disease Maternal Grandfather      Family history reviewed and edited as appropriate    Medications and Allergies:     Outpatient Encounter Medications as of 1/26/2024   Medication Sig Dispense Refill     carBAMazepine (TEGRETOL XR) 100 MG 12 hr tablet Take 100 mg by mouth daily       lamoTRIgine (LAMICTAL) 25 MG tablet Lamotrigine IR Script: Week 1-2: lamotrigine 12.5 mg am, week 3-4: 25 mg morning. Week 5-6: 25 mg twice a day, week 7: 50 mg am and 25 mg pm. Week 8: 50 mg twice a day, week 9: 75 mg am and 50 mg pm, week 10: 75 mg am and 75 mg pm. week 11: 100 mg am and 75 mg pm. Week 12 - onward: 100 mg am and 100 mg pm. 200 tablet 3     Multiple Vitamin (DAILY MULTIVITAMIN PO) Take  by mouth.       ORDER FOR DME Equipment being ordered: right prosthetic leg 1 Device 0     ORDER FOR DME Equipment being ordered: crutches 1 Device 0     vitamin   C (VITAMIN C) 250 MG tablet Take 250 mg by mouth daily.       No facility-administered encounter medications on file as of 1/26/2024.        Allergies   Allergen Reactions     Ciprofloxacin Other (See Comments) and Unknown      Seizures    Seizure reaction not confirmed but suspected     Cephalosporins Other (See Comments)     Latex Other (See Comments) and Swelling     If Latex touches her face, her face swells up.        Review of systems:  A full 10 point review of systems was obtained and was negative except for the pertinent positives and negatives stated within the HPI.    Objective Findings:   Physical Exam:    Constitutional: LMP 05/04/2019 (Approximate)   General: Alert, cooperative, no distress, well-appearing  Head: Atraumatic, normocephalic, no obvious abnormalities   Eyes: Sclera anicteric, no obvious conjunctival hemorrhage   Nose: Nares normal, no obvious malformation, no obvious rhinorrhea   Respiratory: Resting comfortably, no apparent distress, no cough.   Gastrointestinal: Flat non distended   Skin: No jaundice, no obvious rash  Neurologic: AAOx3, no obvious neurologic abnormality  Psychiatric: Normal Affect, appropriate mood  Extremities: No obvious edema, no obvious malformation     Labs, Radiology, Pathology     Lab Results   Component Value Date    WBC 5.7 10/05/2023    WBC 6.5 11/22/2013    HGB 12.6 10/05/2023    HGB 13.7 11/22/2013    HGB 14.0 03/27/2012     10/05/2023     11/22/2013    CHOL 178 10/05/2023    CHOL 177 04/18/2016    CHOL 159 04/16/2015    TRIG 58 10/05/2023    TRIG 76 04/18/2016    TRIG 58 04/16/2015    HDL 69 10/05/2023    HDL 74 04/18/2016    HDL 69 04/16/2015    ALT 16 10/05/2023    AST 30 10/05/2023     10/05/2023     06/15/2017     04/18/2016    BUN 13.3 10/05/2023    BUN 15 06/15/2017    BUN 9 04/18/2016    CO2 25 10/05/2023    CO2 26 06/15/2017    CO2 30 04/18/2016    TSH 1.19 06/15/2017    TSH 1.11 11/22/2013    TSH 1.059 03/27/2012        Liver Function Studies -   Recent Labs   Lab Test 10/05/23  1038   PROTTOTAL 6.5   ALBUMIN 4.0   BILITOTAL 0.4   ALKPHOS 71   AST 30   ALT 16          Patient Active Problem List    Diagnosis Date Noted     Spina bifida (H)  04/01/2013     Priority: High     Right artificial leg, s/p BHA due to a surgical complication. Urostomy bag also. Occasional fecal leakage.             Chronic pain of both shoulders 11/20/2023     Priority: Medium     Chronic bilateral low back pain without sciatica 07/03/2017     Priority: Medium     ASCUS with positive high risk HPV cervical 06/17/2017     Priority: Medium     3/27/12: ASCUS Pap, Neg HPV  4/1/13: NIL Pap  6/10/14: NIL Pap, Neg HPV  6/15/17: ASCUS Pap, + HR HPV 16 & other HR HPV. Plan colp  7/10/17 [place] ECC neg. Plan: cotest in 1 year.  9/27/18 NIL pap, Neg HR HPV. Plan cotest in 3 years.   10/5/23 NIL pap, neg HPV. Plan: cotest in 3 years       History of leg amputation (H) 04/15/2014     Priority: Medium     Attention to urostomy (H) 04/15/2014     Priority: Medium     Nontoxic multinodular goiter 12/05/2013     Priority: Medium     Thyroid nodule 11/25/2013     Priority: Medium     Seizure disorder (H) 04/01/2013     Priority: Medium     Resolved.  Thought to be related to Cipro.         CARDIOVASCULAR SCREENING; LDL GOAL LESS THAN 130 04/01/2013     Priority: Low     History of recurrent UTIs 04/01/2013     Priority: Low     She has a urostomy         Assessment and Plan   Assessment/Plan:   David Grissom is a 58 year old female with significant past medical history pertinent for spine bifida, seizure disorder, NSTEMI, stress-induced cardiomyopathy, nontoxic multinodular goiter, history of leg amputation who is presenting as a new patient in consultation at the request of Dr. Gannon with a chief complaint of substernal chest pain.    #Atypical Chest Pain  #GERD  Patient had admission July 2023 found to have NSTEMI in the setting of stress-induced cardiomyopathy (Echocardiogram with decreased left ventricular ejection fraction 35-40%) in the setting of seizure. She then underwent coronary angiogram which was unremarkable.  Subsequent echocardiogram 9/11/2023 with improvement in EF.  Per  patient reports she is scheduled to follow-up with cardiology in early February.     Today Arminda presents with chronic symptoms of heartburn/regurgitation and substernal chest pain which have prompted her to seek emergent evaluation multiple times in the past 6 months.  Her symptoms occur in the late evening hours and after laying down for bed.  Denying symptoms of both heartburn and regurgitation occurring throughout the day or postprandially.  Noting this will start with heartburn and regurgitation and which she describes as turning into a panic attack.  She then develops substernal chest pain that is nonradiating and can last for 4 to 5 days at a time without change in characteristic/severity.  Between episodes she is overall asymptomatic however during these periods she also experiences solid dysphagia onset approximately 1 year prior.  She denies dysphagia to semisolids, liquids and pills. Previously she has trialed Tums without relief and during her last episode trialed Prilosec with mild improvement in symptoms.  Additional triggers include consumption of coffee/spicy foods and prolonged talking.      Differential includes reflux disease however alternative sources such as panic attacks should be considered with presentation.  She has had appropriate cardiac evaluation with negative coronary angiogram and follows with cardiology in early February per her reports.  Additional etiologies such as structural abnormalities of the esophagus, UES dysfunction and intrinsic motility disorder of the esophagus should also be considered.  Patient is agreeable to undergo upper endoscopy with 96 hour BRAVO study.  She wishes to defer initiation of acid suppressive therapy today and will continue to use Tums as needed. If negative would then consider high-resolution manometry study vs time barium esophagram with tablet.   - Reflux friendly lifestyle modifications as directed in AVS   - Upper endoscopy with biopsies of the  upper and lower esophagus as well as 96 hour pH testing (BRAVO study) to be completed off acid suppressive therapy.   - Future considerations would be consultation to behavioral health psychology however for the time being patient should return to primary care provider to discuss concerns for underlying anxiety which may be also contributing to symptoms      #Colorectal Cancer Screening   Colonoscopy 6/2016 that was unremarkable. No family history of CRC . Per the most recent update by the [place] Multi-Society Task Force on Colorectal Cancer recall should be 10 years, 2026 or sooner if otherwise indicated.       Follow up plan:   Return to clinic 4 - 6 months and as needed.    The risks and benefits of my recommendations, as well as other treatment options were discussed with the patient and any available family today. All questions were answered.     o Follow up: As planned above. Today, I personally spent 37 minutes in direct face to face time with the patient, of which greater than 50% of the time was spent in patient education and counseling as described above. Approximately 13 minutes were spent on indirect care associated with the patient's consultation including but not limited to review of: patient medical records to date, clinic visits, hospital records, lab results, imaging studies, procedural documentation, and coordinating care with other providers. The findings from this review are summarized in the above note. All of the above accounted for a cumulative time of 50 minutes and was performed on the date of service.     The patient verbalized understanding of the plan and was appreciative for the time spent and information provided during the office visit.           Raquel Jasmine PA-C  Division of Gastroenterology, Hepatology, and Nutrition  [facility]       Documentation assisted by voice recognition and documentation system.

## 2024-01-30 ENCOUNTER — TELEPHONE (OUTPATIENT)
Dept: FAMILY MEDICINE | Facility: CLINIC | Age: 59
End: 2024-01-30
Payer: COMMERCIAL

## 2024-01-30 NOTE — LETTER
February 13, 2024      David Grissom  [address] [place] [address]        February 13, 2024      David Grissom  [address] [place] [address]    Your team at [facility] cares about your health. We have reviewed your chart and based on our findings; we are making the following recommendations to better manage your health.     You are in particular need of attention regarding the following:     Schedule Annual MAMMOGRAPHY. [facility] scheduling number is [phone] or schedule in MyChart (self referral).  Schedule a primary care office visit with your provider for a Pap Smear to screen for Cervical Cancer.  Call or MyChart message your clinic to schedule a colonoscopy, schedule/ a FIT Test, or order a Cologuard test. If you are unsure what type of test you need, please call your clinic and speak to clinic staff.   Colon cancer is now the second leading cause of cancer-related deaths in the [facility] for both men and women and there are over 130,000 new cases and 50,000 deaths per year from colon cancer. Colonoscopies can prevent 90-95% of these deaths. Problem lesions can be removed before they ever become cancer. This test is not only looking for cancer, but also getting rid of precancerous lesions.   If you are under/uninsured, we recommend you contact the [employer] Program.[employer] is a free colorectal cancer screening program that provides colonoscopies for eligible under/uninsured [place] men and women. If you are interested in receiving a free colonoscopy, please call [employer] at t [phone] (mention code [employer]) to see if you're eligible. Please have them send us the results.   PREVENTATIVE VISIT: Physical    If you have already completed these items, please contact the clinic via phone or   Process Relationshart so your care team can review and update your records. Thank you for   choosing [facility] for your healthcare needs. For any  questions,   concerns, or to schedule an appointment please contact our clinic.    Healthy Regards,      Your [facility] Care Team        Sincerely,        YVROSE Zepeda CNP

## 2024-01-30 NOTE — TELEPHONE ENCOUNTER
Patient Quality Outreach    Patient is due for the following:   Colon Cancer Screening  Breast Cancer Screening - Mammogram  Cervical Cancer Screening - PAP Needed  Physical Annual Wellness Visit    Next Steps:   Schedule a Annual Wellness Visit    Type of outreach:    Sent [employer] message.    Next Steps:  Reach out within 90 days via Letter.    Max number of attempts reached: No. Will try again in 90 days if patient still on fail list.    Questions for provider review:    None           Tasha Huerta [facility]  Chart routed to Care Team.

## 2024-02-06 ENCOUNTER — ANCILLARY PROCEDURE (OUTPATIENT)
Dept: NEUROLOGY | Facility: CLINIC | Age: 59
End: 2024-02-06
Attending: PSYCHIATRY & NEUROLOGY
Payer: COMMERCIAL

## 2024-02-06 DIAGNOSIS — G40.919 INTRACTABLE EPILEPSY WITHOUT STATUS EPILEPTICUS, UNSPECIFIED EPILEPSY TYPE (H): ICD-10-CM

## 2024-02-06 DIAGNOSIS — G40.909 SEIZURE DISORDER (H): ICD-10-CM

## 2024-02-07 ENCOUNTER — ANCILLARY PROCEDURE (OUTPATIENT)
Dept: NEUROLOGY | Facility: CLINIC | Age: 59
End: 2024-02-07
Attending: PSYCHIATRY & NEUROLOGY
Payer: COMMERCIAL

## 2024-02-13 NOTE — TELEPHONE ENCOUNTER
Patient Quality Outreach    Patient is due for the following:   Colon Cancer Screening  Breast Cancer Screening - Mammogram  Cervical Cancer Screening - PAP Needed  Physical Preventive Adult Physical    Next Steps:   Schedule a Adult Preventative    Type of outreach:    Sent letter.    Next Steps:  Reach out within 90 days via Letter.    Max number of attempts reached: Yes. Will try again in 90 days if patient still on fail list.    Questions for provider review:    None           Tasha Huerta, KIAN  Chart routed to closing chart  .

## 2024-02-14 ENCOUNTER — HOSPITAL ENCOUNTER (OUTPATIENT)
Facility: AMBULATORY SURGERY CENTER | Age: 59
End: 2024-02-14
Attending: INTERNAL MEDICINE
Payer: COMMERCIAL

## 2024-02-14 ENCOUNTER — TELEPHONE (OUTPATIENT)
Dept: GASTROENTEROLOGY | Facility: CLINIC | Age: 59
End: 2024-02-14
Payer: COMMERCIAL

## 2024-02-14 NOTE — TELEPHONE ENCOUNTER
"Endoscopy Scheduling Screen    Have you had a positive Covid test in the last 14 days?  No    Are you active on MyChart?   Yes    What insurance is in the chart?  Other:  [facility]    Ordering/Referring Provider: Raquel Jasmine PA-C     (If ordering provider performs procedure, schedule with ordering provider unless otherwise instructed. )    BMI: Estimated body mass index is 21.09 kg/m  as calculated from the following:    Height as of 1/26/24: 1.524 m (5').    Weight as of 1/26/24: 49 kg (108 lb).     Sedation Ordered  MAC/deep sedation.   BMI<= 45 45 < BMI <= 48 48 < BMI < = 50  BMI > 50   No Restrictions No [facility]  No [facility]  [facility] with exceptions Hospital Only OR Only       Are you taking any prescription medications for pain 3 or more times per week?   NO - No RN review required.    Do you have a history of malignant hyperthermia or adverse reaction to anesthesia?  No    (Females) Are you currently pregnant?   No     Have you been diagnosed or told you have pulmonary hypertension?   No    Do you have an LVAD?  No    Have you been told you have moderate to severe sleep apnea?  No    Have you been told you have COPD, asthma, or any other lung disease?  No    Do you have any heart conditions?  No     Have you ever had an organ transplant?   No    Have you ever had or are you awaiting a heart or lung transplant?   No    Have you had a stroke or transient ischemic attack (TIA aka \"mini stroke\" in the last 6 months?   No    Have you been diagnosed with or been told you have cirrhosis of the liver?   No    Are you currently on dialysis?   No    Do you need assistance transferring?   No    BMI: Estimated body mass index is 21.09 kg/m  as calculated from the following:    Height as of 1/26/24: 1.524 m (5').    Weight as of 1/26/24: 49 kg (108 lb).     Is patients BMI > 40 and scheduling location [facility]?  No    Do you take an injectable medication for weight loss or diabetes (excluding insulin)?  No    Do you take the " medication Naltrexone?  No    Do you take blood thinners?  No       Prep   Are you currently on dialysis or do you have chronic kidney disease?  No    Do you have a diagnosis of diabetes?  No    Do you have a diagnosis of cystic fibrosis (CF)?  No    On a regular basis do you go 3 -5 days between bowel movements?  No    BMI > 40?  No    Preferred Pharmacy:      [id number] - [place] - [address] AT [address]  [address]  [place] [address]  Phone: [phone] Fax: [phone]      Final Scheduling Details   Colonoscopy prep sent?  N/A    Procedure scheduled  Upper endoscopy with BRAVO capsule placement    Surgeon:  LIZBET     Date of procedure:  6/10     Pre-OP / PAC:   No - Not required for this site.    Location  [facility] - ASC - Patient preference.    Sedation   MAC/Deep Sedation - Per order.      Patient Reminders:   You will receive a call from a Nurse to review instructions and health history.  This assessment must be completed prior to your procedure.  Failure to complete the Nurse assessment may result in the procedure being cancelled.      On the day of your procedure, please designate an adult(s) who can drive you home stay with you for the next 24 hours. The medicines used in the exam will make you sleepy. You will not be able to drive.      You cannot take public transportation, ride share services, or non-medical taxi service without a responsible caregiver.  Medical transport services are allowed with the requirement that a responsible caregiver will receive you at your destination.  We require that drivers and caregivers are confirmed prior to your procedure.

## 2024-03-16 ENCOUNTER — VIRTUAL VISIT (OUTPATIENT)
Dept: URGENT CARE | Facility: CLINIC | Age: 59
End: 2024-03-16
Payer: COMMERCIAL

## 2024-03-16 ENCOUNTER — NURSE TRIAGE (OUTPATIENT)
Dept: NURSING | Facility: CLINIC | Age: 59
End: 2024-03-16

## 2024-03-16 DIAGNOSIS — H92.09 OTALGIA, UNSPECIFIED LATERALITY: Primary | ICD-10-CM

## 2024-03-16 PROCEDURE — 99207 PR NO CHARGE LOS: CPT

## 2024-03-16 NOTE — TELEPHONE ENCOUNTER
Nurse Triage SBAR    Is this a 2nd Level Triage? NO    Situation:  Ear congestion.    Background: Per pt, she has had bilateral ear congestion,cold like symptoms and sore throat for 3-4 days. Per pt, although she hasn't seen any drainage, left ear has feeling of something draining x 2 days.    Assessment: Pt denied any acute distress.    Protocol Recommended Disposition:   See PCP Within 3 Days Pt is given care advise, call back indications and is warm transferred to [facility] for appointment scheduling. Pt verbalized understanding and agreement with plan of care and no further questions at this time.    Recommendation:           Does the patient meet one of the following criteria for ADS visit consideration? 16+ years old, with an [facility] PCP     TIP  Providers, please consider if this condition is appropriate for management at one of our Acute and Diagnostic Services sites.     If patient is a good candidate, please use dotphrase <dot>triageresponse and select Refer to ADS to document.    Reason for Disposition   Ear congestion present > 48 hours    Additional Information   Negative: [1] Bloody or clear ear discharge AND [2] after a head or face injury   Negative: [1] Unexplained bleeding AND [2] lasts > 10 minutes or large amount (Exception: If a few drops of blood, continue with triage.)   Negative: Fever > 103 F (39.4 C)   Negative: Patient sounds very sick or weak to the triager   Negative: Diabetes mellitus or weak immune system (e.g., HIV positive, cancer chemo, splenectomy, organ transplant, chronic steroids)   Negative: Ear pain   Negative: Fever > 100.4 F (38.0 C)   Negative: Foul-smelling discharge   Negative: Cloudy - white discharge   Negative: Yellow or green discharge   Negative: [1] Clear drainage (not from a head injury) AND [2] persists > 24 hours   Negative: Unexplained bleeding from ear   Negative: [1] Few drops of blood AND [2] minor known cause (i.e., from small scratch)   Negative: [1] Few  drops of blood AND [2] follows ear exam at doctor's office   Negative: [1] Clear ear discharge AND [2] present < 24 hours   Negative: Sounds like normal earwax   Negative: Earache persists > 1 hour   Negative: Pus or cloudy discharge from ear canal    Protocols used: Ear - Lszyeqkac-C-MM, Ear - Congestion-A-AH

## 2024-03-16 NOTE — PROGRESS NOTES
CC: B ear congestion    Patient with increased ear congestion/pain B.    1. Otalgia, unspecified laterality    Patient understanding that she needs to be evaluated in person so we can look inside her ears and see what might be causing the pain.  Will head in to  today.  No charge for [facility].    Cherelle Hanley MD  [facility]

## 2024-03-18 ENCOUNTER — OFFICE VISIT (OUTPATIENT)
Dept: FAMILY MEDICINE | Facility: CLINIC | Age: 59
End: 2024-03-18
Payer: COMMERCIAL

## 2024-03-18 VITALS
OXYGEN SATURATION: 97 % | TEMPERATURE: 98 F | HEART RATE: 79 BPM | SYSTOLIC BLOOD PRESSURE: 129 MMHG | WEIGHT: 115.4 LBS | DIASTOLIC BLOOD PRESSURE: 79 MMHG | RESPIRATION RATE: 20 BRPM | BODY MASS INDEX: 22.54 KG/M2

## 2024-03-18 DIAGNOSIS — H93.8X3 EAR FULLNESS, BILATERAL: Primary | ICD-10-CM

## 2024-03-18 PROCEDURE — 69210 REMOVE IMPACTED EAR WAX UNI: CPT | Mod: LT

## 2024-03-18 PROCEDURE — 99212 OFFICE O/P EST SF 10 MIN: CPT | Mod: 25

## 2024-03-18 RX ORDER — ASPIRIN 81 MG/1
81 TABLET ORAL DAILY
COMMUNITY

## 2024-03-18 ASSESSMENT — ENCOUNTER SYMPTOMS: COUGH: 1

## 2024-03-18 NOTE — PROGRESS NOTES
Assessment & Plan     Ear fullness, bilateral  Ear fullness most likely due to viral infection, possibly viral sinusitis. No signs of AOM. Wax removed from left ear. Patient started using Nasonex last night. Plan for her to continue to use this along with saline spray for symptomatic relief.   - REMOVAL OF IMPACTED WAX MD Roman   Arminda is a 58 year old, presenting for the following health issues:  Ear Problem and Cough      3/18/2024     1:32 PM   Additional Questions   Roomed by chloe aguilar     Ear Problem  Associated symptoms include cough.   Cough  Associated symptoms include ear pain.   History of Present Illness       Reason for visit:  Cold/cough  Symptom onset:  1-2 weeks ago  Symptoms include:  Cold, cough, ears plugged  Symptom intensity:  Moderate  Symptom progression:  Staying the same  Had these symptoms before:  No    She eats 2-3 servings of fruits and vegetables daily.She consumes 1 sweetened beverage(s) daily.She exercises with enough effort to increase her heart rate 20 to 29 minutes per day.  She exercises with enough effort to increase her heart rate 3 or less days per week.   She is taking medications regularly.         Acute Illness  Acute illness concerns: cough, ear pain,, cold  Onset/Duration: ears 1 week/ cough 2 weeks  Symptoms:  Fever: No  Chills/Sweats: YES  Headache (location?): No  Sinus Pressure: YES  Conjunctivitis:  No  Ear Pain: YES: bilateral  Rhinorrhea: YES  Congestion: YES  Sore Throat: YES- a little today  Cough: YES  Wheeze: No  Decreased Appetite: YES  Nausea: No  Vomiting: No  Diarrhea: YES- over the weekend  Dysuria/Freq.: No  Dysuria or Hematuria: No  Fatigue/Achiness: YES  Sick/Strep Exposure: No  Therapies tried and outcome: OTC cough medication, nasonex    Both ears are plugged, sore throat has improved, cough has improved, Sister had a cold two weeks ago.           Objective    /79 (BP Location: Left arm, Patient Position: Sitting, Cuff Size:  Adult Regular)   Pulse 79   Temp 98  F (36.7  C) (Oral)   Resp 20   Wt 52.3 kg (115 lb 6.4 oz)   LMP 05/04/2019 (Approximate)   SpO2 97%   BMI 22.54 kg/m    Body mass index is 22.54 kg/m .  Physical Exam   GENERAL: alert and no distress  HENT: right ear: normal: no effusions, no erythema, normal landmarks and left ear: occluded with wax. After removal, slight redness to ear canal, normal landmarks, no effusion.   RESP: lungs clear to auscultation - no rales, rhonchi or wheezes  CV: regular rate and rhythm, normal S1 S2, no S3 or S4, no murmur, click or rub, no peripheral edema    Left ear cerumen was removed by myself with a curette    Signed Electronically by: YVROSE Crabtree CNP

## 2024-03-25 PROBLEM — M25.511 CHRONIC PAIN OF BOTH SHOULDERS: Status: RESOLVED | Noted: 2023-11-20 | Resolved: 2024-03-25

## 2024-03-25 PROBLEM — M25.512 CHRONIC PAIN OF BOTH SHOULDERS: Status: RESOLVED | Noted: 2023-11-20 | Resolved: 2024-03-25

## 2024-03-25 PROBLEM — G89.29 CHRONIC PAIN OF BOTH SHOULDERS: Status: RESOLVED | Noted: 2023-11-20 | Resolved: 2024-03-25

## 2024-03-25 NOTE — PROGRESS NOTES
DISCHARGE  Reason for Discharge: Patient has failed to schedule further appointments.    Equipment Issued: None    Discharge Plan: Patient to continue home program.    Referring Provider:  Selena Falcon       01/08/24 0500   Appointment Info   Signing clinician's name / credentials Saulo Rios DPT   Total/Authorized Visits 8 (POC) (E&T)   Visits Used 4   Medical Diagnosis Chronic pain of both shoulders   PT Tx Diagnosis B shoulder pain   Precautions/Limitations PMH: Spina bifida, Below knee prosthesis, Seizures, history of stroke   Other pertinent information Likely DC / NEXT: Check abduction / Strengthening flexion and abduction / B ER /   Quick Adds Certification   Progress Note/Certification   Start of Care Date 11/20/23   Onset of illness/injury or Date of Surgery 11/20/21   Therapy Frequency 1 time per week   Predicted Duration 8 weeks   Certification date from 11/20/23   Certification date to 01/15/24   Progress Note Due Date 01/15/24   Progress Note Completed Date 11/20/23   GOALS   PT Goals 2   PT Goal 1   Goal Identifier Sit to stand   Goal Description Pt will be able to transfer from sitting to standing with a minimal increase in shoulder pain 1-2/10.   Target Date 01/15/24   PT Goal 2   Goal Identifier Overhead reaching   Goal Description Pt will be able to reach overhead in order to grab an item from a cupboard with a minimal increase in pain 1-2/10.   Target Date 01/15/24   Date Met 01/08/24   Subjective Report   Subjective Report Shoulders are feeling good. Lifting and transferring have been going better. HEP going well. 0/10 pain right now.   Objective Measures   Objective Measures Objective Measure 1;Objective Measure 2   Objective Measure 1   Objective Measure Palpation   Details Hypomobility of glenohumeral joint improved from previous session   Objective Measure 2   Objective Measure Observation   Details Minimal cueing required for HEP exercises   Treatment Interventions (PT)    Interventions Therapeutic Procedure/Exercise;Manual Therapy   Therapeutic Procedure/Exercise   Therapeutic Procedures: strength, endurance, ROM, flexibility minutes (24544) 25   Therapeutic Procedures Ther Proc 2;Ther Proc 3;Ther Proc 4;Ther Proc 5;Ther Proc 6;Ther Proc 7   Ther Proc 3 Seated abduction x15 B with 1#   Ther Proc 4 IR behind back x5 B with 10 second holds and towel   Ther Proc 5 Seated rows x15 with Blue TB   Ther Proc 6 Low rows x15 with RTB   Ther Proc 7 Scaption in sitting x15 B with 0# and x15 B with 1#   Skilled Intervention Strengthening and ROM exercise education to improve function   Patient Response/Progress No increase in discomfort noted   Ther Proc 6 - Details Cueing to keep elbows straight   Manual Therapy   Manual Therapy: Mobilization, MFR, MLD, friction massage minutes (91769) 15   Manual Therapy Manual Therapy 2   Manual Therapy 1 AP glenohumeral joint mobilizations grades 1-3 to improve motion and reduce pain   Manual Therapy 2 Inferior glenohumeral joint mobilizations grades 1-3 to improve motion and reduce pain   Patient Response/Progress Improved motion   Education   Learner/Method Patient;No Barriers to Learning   Plan   Home program PTRx (handout)   Total Session Time   Timed Code Treatment Minutes 40   Total Treatment Time (sum of timed and untimed services) 40

## 2024-04-03 ENCOUNTER — MYC REFILL (OUTPATIENT)
Dept: FAMILY MEDICINE | Facility: CLINIC | Age: 59
End: 2024-04-03

## 2024-04-03 ENCOUNTER — OFFICE VISIT (OUTPATIENT)
Dept: FAMILY MEDICINE | Facility: CLINIC | Age: 59
End: 2024-04-03
Payer: COMMERCIAL

## 2024-04-03 VITALS
DIASTOLIC BLOOD PRESSURE: 77 MMHG | HEART RATE: 70 BPM | OXYGEN SATURATION: 97 % | BODY MASS INDEX: 22.78 KG/M2 | SYSTOLIC BLOOD PRESSURE: 118 MMHG | RESPIRATION RATE: 22 BRPM | WEIGHT: 116 LBS | HEIGHT: 60 IN | TEMPERATURE: 98.1 F

## 2024-04-03 DIAGNOSIS — H65.92 OME (OTITIS MEDIA WITH EFFUSION), LEFT: ICD-10-CM

## 2024-04-03 DIAGNOSIS — H93.8X3 EAR FULLNESS, BILATERAL: Primary | ICD-10-CM

## 2024-04-03 PROCEDURE — 99213 OFFICE O/P EST LOW 20 MIN: CPT | Mod: 25 | Performed by: PHYSICIAN ASSISTANT

## 2024-04-03 PROCEDURE — 69210 REMOVE IMPACTED EAR WAX UNI: CPT | Mod: LT | Performed by: PHYSICIAN ASSISTANT

## 2024-04-03 ASSESSMENT — PAIN SCALES - GENERAL: PAINLEVEL: MILD PAIN (2)

## 2024-04-03 NOTE — PROGRESS NOTES
Assessment & Plan     Ear fullness, bilateral  Discussed with patient today that I have concerns for eustachian tube dysfunction. This can take time to heal but with the longevity of her symptoms along with nasal and sinus congestion it would be worth seeing ENT if there is anything else that could be evaluated or done to help with symptoms. Patient agree's with this plan and has no further questions  - Adult ENT  Referral; Future    OME (otitis media with effusion), left  Patient presents with an ear infection. Possibly related to the ear fullness but this is likely more acute and separate from her ear fullness. Antibiotic prescribed and side effects discussed. Advised to take full course of antibiotics. Take OTC pain relievers a needed. Symptoms should improve over the next 1-2 days. Follow up in clinic as needed  - amoxicillin-clavulanate (AUGMENTIN) 875-125 MG tablet; Take 1 tablet by mouth 2 times daily              Jessie Hilliard is a 58 year old, presenting for the following health issues:  Plugged Ears        4/3/2024     3:35 PM   Additional Questions   Roomed by Gracie FUENTES CMA   Accompanied by N/A         4/3/2024     3:35 PM   Patient Reported Additional Medications   Patient reports taking the following new medications None     History of Present Illness       Reason for visit:  Ears plugged  Symptom onset:  1-2 weeks ago  Symptoms include:  Head congestion and nasal pressure with drainage to the back  Symptom intensity:  Moderate    She eats 2-3 servings of fruits and vegetables daily.She consumes 0 sweetened beverage(s) daily.She exercises with enough effort to increase her heart rate 30 to 60 minutes per day.  She exercises with enough effort to increase her heart rate 4 days per week.   She is taking medications regularly.         Acute Illness  Acute illness concerns: URI on and off over the last months, ears plugged for the last  month  Onset/Duration: 1 month of plugged    Symptoms:  Fever: No  Chills/Sweats: felt chilly one day  Headache (location?): No  Sinus Pressure: YES  Conjunctivitis:  No  Ear Pain: YES- feels plugged, hard to hear  Rhinorrhea: YES- postnasal drip  Congestion: YES  Sore Throat: YES  Cough: no  Wheeze: No  Decreased Appetite: No  Nausea: No  Vomiting: No  Diarrhea: No  Dysuria/Freq.: No  Dysuria or Hematuria: No  Fatigue/Achiness: YES  Sick/Strep Exposure: No  Therapies tried and outcome: ibuprofen , nasonex and saline spray  She does have allergies so her symptoms aybe related to that but also on and off colds.       Review of Systems  Constitutional, HEENT, cardiovascular, pulmonary, gi and gu systems are negative, except as otherwise noted.      Objective    /77 (BP Location: Right arm, Patient Position: Chair, Cuff Size: Adult Regular)   Pulse 70   Temp 98.1  F (36.7  C) (Oral)   Resp 22   Ht 1.524 m (5')   Wt 52.6 kg (116 lb)   LMP 05/04/2019 (Approximate)   SpO2 97%   BMI 22.65 kg/m    Body mass index is 22.65 kg/m .  Physical Exam   GENERAL: alert and no distress  EYES: Eyes grossly normal to inspection, PERRL and conjunctivae and sclerae normal  HENT: normal cephalic/atraumatic, right ear: clear effusion, left ear: erythematous and TM retracted,erythema to TM, mild amount of wax build up, nasal mucosa edematous , oropharynx clear, and oral mucous membranes moist  NECK: no adenopathy, no asymmetry, masses, or scars  MS: no gross musculoskeletal defects noted, no edema    CERUMEN removal - removed wax by provider with curette. Patient tolerated well        Signed Electronically by: RAFAEL Ansari

## 2024-04-04 ENCOUNTER — TELEPHONE (OUTPATIENT)
Dept: FAMILY MEDICINE | Facility: CLINIC | Age: 59
End: 2024-04-04
Payer: COMMERCIAL

## 2024-04-04 NOTE — TELEPHONE ENCOUNTER
Prescription on file at Requesting Pharmacy.     Haylee Saini RN BSN  [facility]

## 2024-04-04 NOTE — TELEPHONE ENCOUNTER
RN called pharmacy    They have it in stock    They are filling it right now    RN informed patient and she verbalized understanding    Chyna Tong RN

## 2024-04-04 NOTE — TELEPHONE ENCOUNTER
"Pt calling regarding prescription for Augmentin sent yesterday. She contacted pharmacy and was informed that they were unable to fill prescription. States there was something wrong with the date on the prescription. Reviewed prescription and appears receipt was confirmed by pharmacy \" Receipt confirmed by pharmacy (4/3/2024  6:03 PM CDT)\"  Noted there is an end date warning \"medication without an end date.\"     Contacted pharmacy and pharmacy doesn't open until 9am.     Routing to [place] RN pool to please follow up with pharmacy when open. Thanks!    Naheed Holly RN  [facility]    "

## 2024-04-29 ENCOUNTER — OFFICE VISIT (OUTPATIENT)
Dept: INTERNAL MEDICINE | Facility: CLINIC | Age: 59
End: 2024-04-29
Payer: COMMERCIAL

## 2024-04-29 VITALS
SYSTOLIC BLOOD PRESSURE: 136 MMHG | DIASTOLIC BLOOD PRESSURE: 76 MMHG | WEIGHT: 118 LBS | HEIGHT: 60 IN | HEART RATE: 67 BPM | BODY MASS INDEX: 23.16 KG/M2 | TEMPERATURE: 97.7 F | OXYGEN SATURATION: 68 %

## 2024-04-29 DIAGNOSIS — Z98.890 HISTORY OF UROSTOMY: ICD-10-CM

## 2024-04-29 DIAGNOSIS — K59.00 CONSTIPATION, UNSPECIFIED CONSTIPATION TYPE: Primary | ICD-10-CM

## 2024-04-29 DIAGNOSIS — Z87.2 HISTORY OF DECUBITUS ULCER: ICD-10-CM

## 2024-04-29 DIAGNOSIS — Q05.9 SPINA BIFIDA WITHOUT HYDROCEPHALUS, UNSPECIFIED SPINAL REGION (H): ICD-10-CM

## 2024-04-29 DIAGNOSIS — G40.909 SEIZURE DISORDER (H): ICD-10-CM

## 2024-04-29 PROCEDURE — 99214 OFFICE O/P EST MOD 30 MIN: CPT

## 2024-04-29 RX ORDER — SENNA AND DOCUSATE SODIUM 50; 8.6 MG/1; MG/1
1 TABLET, FILM COATED ORAL AT BEDTIME
Qty: 30 TABLET | Refills: 2 | Status: SHIPPED | OUTPATIENT
Start: 2024-04-29

## 2024-04-29 NOTE — PATIENT INSTRUCTIONS
For constipation  Take Senna S daily  Miralax   Bisacodyl 5mg tablet at bedtime if no relief with senna and miralax     Also can add metamucil fiber gummies    Keep a food diary    Food that may be causing sensitivity keep out of your diet for 2 weeks then re add to see if symptoms persist     Kendrick is the allergy test kit for food allergies

## 2024-04-29 NOTE — PROGRESS NOTES
Assessment & Plan     (K59.00) Constipation, unspecified constipation type  (primary encounter diagnosis)  Comment: Patient was seen in the emergency department for stomach pain, and was found to have a lot of stool in her bowel but no obstruction was noted. Discussed with patient over the counter mediations that can help with constipation. Provided information on foods that can help ease symptoms.  Plan: SENNA-docusate sodium (SENNA S) 8.6-50 MG         tablet        Prescription sent to the pharmacy  Patient will update if BM's do not become regular     (Z98.890) History of urostomy  Comment: Patient lost her health insurance and needs referral to urology due to long time health issues, has had urostomy since she was 5 also has history of UTI and kidney infections.    Plan: Adult Urology  Referral        Future     (Q05.9) Spina bifida without hydrocephalus, unspecified spinal region (H)  Comment: Chronic, stable.   Plan: No change in plan of care    (G40.909) Seizure disorder (H)  Comment: Chronic, stable.   Plan: No change in plan of care    (Z87.2) History of decubitus ulcer  Comment: Chronic, stable. Noted pink skin to area on left buttock, no open areas, no concern for tunneling at this time. Discussed need to monitor, last treated in 2018.   Plan: Monitor and update with concerns.       MED REC REQUIRED  Post Medication Reconciliation Status: discharge medications reconciled, continue medications without change    CONSULTATION/REFERRAL to Urology    Jessie Hilliard is a 58 year old, presenting for the following health issues:  Hospital F/U        4/29/2024     1:04 PM   Additional Questions   Roomed by Tasha CORTES     [facility]     ED/UC Followup:    Facility:  [place]  Date of visit: 4/21/24  Reason for visit: abdominal pain   Current Status: Feels better        Review of Systems  Constitutional, HEENT, cardiovascular, pulmonary, gi and gu systems are negative, except as otherwise noted.      Objective     /76 (BP Location: Left arm, Patient Position: Sitting, Cuff Size: Adult Regular)   Pulse 67   Temp 97.7  F (36.5  C) (Temporal)   Ht 1.524 m (5')   Wt 53.5 kg (118 lb)   LMP 05/04/2019 (Approximate)   SpO2 (!) 68%   BMI 23.05 kg/m    Body mass index is 23.05 kg/m .  Physical Exam   GENERAL: alert and no distress  EYES: Eyes grossly normal to inspection, PERRL and conjunctivae and sclerae normal  HENT: ear canals and TM's normal, nose and mouth without ulcers or lesions  NECK: no adenopathy, no asymmetry, masses, or scars  RESP: lungs clear to auscultation - no rales, rhonchi or wheezes  CV: regular rate and rhythm, normal S1 S2, no S3 or S4, no murmur, click or rub, no peripheral edema  ABDOMEN: soft, nontender, no hepatosplenomegaly, no masses and bowel sounds normal  MS: no gross musculoskeletal defects noted, no edema  SKIN: no suspicious lesions or rashes  NEURO: Normal strength and tone, mentation intact and speech normal  PSYCH: mentation appears normal, affect normal/bright            Signed Electronically by: YVROSE Zepeda CNP

## 2024-05-03 ENCOUNTER — TELEPHONE (OUTPATIENT)
Dept: UROLOGY | Facility: CLINIC | Age: 59
End: 2024-05-03

## 2024-05-03 NOTE — TELEPHONE ENCOUNTER
[facility] Call Center    Phone Message    May a detailed message be left on voicemail: yes     Reason for Call: Other: Pt has a referral for history or urostomy, writer cannot schedule this Dx due to not being in protocol. Please call pt back to set up     Action Taken: Message routed to:  Other: uro    Travel Screening: Not Applicable

## 2024-05-10 NOTE — TELEPHONE ENCOUNTER
Action May 9, 2024 JTV 10:04 PM    Action Taken CSS sent an urgent request to [facility] for images.    MEDICAL RECORDS REQUEST   [facility]  Urology Clinic  [address] [place] [id number]  PHONE: [phone]  Fax: [phone]        FUTURE VISIT INFORMATION                                                   David Grissom, : 1965 scheduled for future visit at [facility]    APPOINTMENT INFORMATION:  Date: 2024  Provider:  Shadia Keller NP  Reason for Visit/Diagnosis: urostomy has had since she was 5 has chronic UTI's and kidney infections    REFERRAL INFORMATION:  Referring provider:  Courtney Pillai APRN CNP in  INTERNAL MEDICINE      RECORDS REQUESTED FOR VISIT                                                     NOTES  STATUS/DETAILS   OFFICE NOTE from referring provider  yes, 2024 -- Courtney Pillai APRN CNP in  INTERNAL MEDICINE   OFFICE NOTE from other specialist  yes   MEDICATION LIST  yes   LABS     URINALYSIS (UA)  yes   IMAGES PACS yes, [facility] -- [facility]  2024 -- CT ABD PELVIS  2023 -- CT CHEST  2023, 2023, 2023 -- XR CHEST     PRE-VISIT CHECKLIST      Joint diagnostic appointment coordinated correctly          (ensure right order & amount of time) Yes   RECORD COLLECTION COMPLETE Yes

## 2024-05-13 NOTE — PROGRESS NOTES
HPI:  David Grissom is a 58 year old female with history of recurrent UTIs being seen for neurogenic bladder 2/2 SB s/p cystectomy and ileal conduit creation at age 5. She is here to establish care.      - No UTIs in the past year    - Needs urostomy supplies refilled: convatec 3/4 '' (19mm) - changed every 3 days, empty when full, does not use leg bags at night, change it once overnight    - ER visit a month ago for stomach pain, CT showed renal cysts but no stones         Reviewed previous notes from Shaye Green, PAC from Urology service at , and from Dr. Tuttle from [facility] ER    Exam:  BP (!) 148/83   Pulse 65   Temp 98.3  F (36.8  C)   LMP 05/04/2019 (Approximate)   SpO2 98%   General: age-appropriate appearing female in NAD sitting in an exam chair  HEENT: Head AT/NC, EOMI, CN Grossly intact.  Abdomen: Degree of obesity is none. Abdomen is soft and nontender. No organomegaly. Surgical scars include cystectomy and hysterectomy.  :  urine clear  Neuro: grossly non focal.   Skin: clear of rashes or ecchymoses.       Review of Imaging:  The following imaging exams were independently viewed and interpreted by me and discussed with patient:  CTAP 4/21/2024  KIDNEYS/BLADDER: Moderate cortical scarring of both kidneys. Bilateral renal cysts have benign features and require no follow-up. Mild left-sided hydronephrosis and dilatation of both ureters to the junction with the urostomy. No appreciable mass or stones visualized. Cystectomy.     CTAP 2/16/23  LEFT KIDNEY/URETER: Left kidney also demonstrates scarring. There is enhancement of the renal collecting system as well as of the ureteral wall. Left ureter is markedly dilated.     Review of Labs:  The following labs were reviewed by me and discussed with the patient:  No results found for this or any previous visit (from the past 720 hour(s)).  Creatinine 0.91 on 4/21/2024, around baseline    Assessment & Plan   Neurogenic bladder secondary to spina bifida  status post ileal conduit    - Discussed that renal cysts are benign and that the mild L hydronephrosis is stable compared to a year ago, so no interventions needed.    - Recommended that patient follow up with PCP regarding GI concerns.    - Patient would need convatec 3/4 '' one piece urostomy bag #12 per month w/ #20 adhesive removal wipes. Order will be faxed to a medical supplier.    - Follow up with me in 1 year        Shadia Keller NP  [facility] [place]    ==========================      Additional Coding Information:    Problems:  3 -- one stable chronic illness    Data Reviewed  Review of external notes as documented above       Level of risk:  3 -- low risk (e.g., OTC medication or observation, minor surgery without risks)    Time spent:  I spent a total of 30 minutes on the day of the visit.   Time spent by me doing chart review, history and exam, documentation and further activities per the note

## 2024-05-16 ENCOUNTER — OFFICE VISIT (OUTPATIENT)
Dept: UROLOGY | Facility: CLINIC | Age: 59
End: 2024-05-16
Payer: COMMERCIAL

## 2024-05-16 VITALS
DIASTOLIC BLOOD PRESSURE: 83 MMHG | SYSTOLIC BLOOD PRESSURE: 148 MMHG | OXYGEN SATURATION: 98 % | TEMPERATURE: 98.3 F | HEART RATE: 65 BPM

## 2024-05-16 DIAGNOSIS — Z93.6 STATUS POST ILEAL CONDUIT (H): ICD-10-CM

## 2024-05-16 DIAGNOSIS — Z98.890 HISTORY OF UROSTOMY: ICD-10-CM

## 2024-05-16 DIAGNOSIS — N31.9 NEUROGENIC BLADDER: Primary | ICD-10-CM

## 2024-05-16 PROCEDURE — 99203 OFFICE O/P NEW LOW 30 MIN: CPT

## 2024-05-16 ASSESSMENT — PAIN SCALES - GENERAL: PAINLEVEL: MILD PAIN (2)

## 2024-05-16 NOTE — NURSING NOTE
Chief Complaint   Patient presents with    Consult     Pt has urostomy, establish care       Blood pressure (!) 148/83, pulse 65, temperature 98.3  F (36.8  C), last menstrual period 2019, SpO2 98%. There is no height or weight on file to calculate BMI.    Patient Active Problem List   Diagnosis    CARDIOVASCULAR SCREENING; LDL GOAL LESS THAN 130    Spina bifida (H)    Seizure disorder (H)    History of recurrent UTIs    Thyroid nodule    Nontoxic multinodular goiter    History of leg amputation (H)    Attention to urostomy (H)    ASCUS with positive high risk HPV cervical    Chronic bilateral low back pain without sciatica       Allergies   Allergen Reactions    Ciprofloxacin Other (See Comments) and Unknown     Seizures    Seizure reaction not confirmed but suspected    Cephalosporins Other (See Comments)    Latex Other (See Comments) and Swelling     If Latex touches her face, her face swells up.       Current Outpatient Medications   Medication Sig Dispense Refill    aspirin 81 MG EC tablet Take 81 mg by mouth daily      lamoTRIgine (LAMICTAL) 25 MG tablet Lamotrigine IR Script: Week 1-2: lamotrigine 12.5 mg am, week 3-4: 25 mg morning. Week 5-6: 25 mg twice a day, week 7: 50 mg am and 25 mg pm. Week 8: 50 mg twice a day, week 9: 75 mg am and 50 mg pm, week 10: 75 mg am and 75 mg pm. week 11: 100 mg am and 75 mg pm. Week 12 - onward: 100 mg am and 100 mg pm. 200 tablet 3    Multiple Vitamin (DAILY MULTIVITAMIN PO) Take  by mouth.      ORDER FOR DME Equipment being ordered: right prosthetic leg 1 Device 0    ORDER FOR DME Equipment being ordered: crutches 1 Device 0    SENNA-docusate sodium (SENNA S) 8.6-50 MG tablet Take 1 tablet by mouth at bedtime 30 tablet 2       Social History     Tobacco Use    Smoking status: Former     Current packs/day: 0.00     Types: Cigarettes     Quit date: 1987     Years since quittin.3     Passive exposure: Past    Smokeless tobacco: Never   Vaping Use    Vaping  status: Never Used   Substance Use Topics    Alcohol use: Yes     Alcohol/week: 0.0 - 1.0 standard drinks of alcohol     Comment: occasional    Drug use: No       Rafi Cottrell  5/16/2024  11:52 AM

## 2024-05-16 NOTE — LETTER
5/16/2024       RE: David Grissom  [address]  [place] [address]     Dear Colleague,    Thank you for referring your patient, David Girssom, to the [facility] [place] at [facility]. Please see a copy of my visit note below.    HPI:  David Grissom is a 58 year old female with history of recurrent UTIs being seen for neurogenic bladder 2/2 SB s/p cystectomy and ileal conduit creation at age 5. She is here to establish care.      - No UTIs in the past year    - Needs urostomy supplies refilled: convatec 3/4 '' (19mm) - changed every 3 days, empty when full, does not use leg bags at night, change it once overnight    - ER visit a month ago for stomach pain, CT showed renal cysts but no stones         Reviewed previous notes from Shaye Green PAC from Urology service at , and from Dr. Tuttle from [facility] ER    Exam:  BP (!) 148/83   Pulse 65   Temp 98.3  F (36.8  C)   LMP 05/04/2019 (Approximate)   SpO2 98%   General: age-appropriate appearing female in NAD sitting in an exam chair  HEENT: Head AT/NC, EOMI, CN Grossly intact.  Abdomen: Degree of obesity is none. Abdomen is soft and nontender. No organomegaly. Surgical scars include cystectomy and hysterectomy.  :  urine clear  Neuro: grossly non focal.   Skin: clear of rashes or ecchymoses.       Review of Imaging:  The following imaging exams were independently viewed and interpreted by me and discussed with patient:  CTAP 4/21/2024  KIDNEYS/BLADDER: Moderate cortical scarring of both kidneys. Bilateral renal cysts have benign features and require no follow-up. Mild left-sided hydronephrosis and dilatation of both ureters to the junction with the urostomy. No appreciable mass or stones visualized. Cystectomy.     CTAP 2/16/23  LEFT KIDNEY/URETER: Left kidney also demonstrates scarring. There is enhancement of the renal collecting system as well as of the ureteral wall.  Left ureter is markedly dilated.     Review of Labs:  The following labs were reviewed by me and discussed with the patient:  No results found for this or any previous visit (from the past 720 hour(s)).  Creatinine 0.91 on 4/21/2024, around baseline    Assessment & Plan  Neurogenic bladder secondary to spina bifida status post ileal conduit    - Discussed that renal cysts are benign and that the mild L hydronephrosis is stable compared to a year ago, so no interventions needed.    - Recommended that patient follow up with PCP regarding GI concerns.    - Patient would need convatec 3/4 '' one piece urostomy bag #12 per month w/ #20 adhesive removal wipes. Order will be faxed to a medical supplier.    - Follow up with me in 1 year        Shadia Keller NP  [facility] [place]    ==========================      Additional Coding Information:    Problems:  3 -- one stable chronic illness    Data Reviewed  Review of external notes as documented above       Level of risk:  3 -- low risk (e.g., OTC medication or observation, minor surgery without risks)    Time spent:  I spent a total of 30 minutes on the day of the visit.   Time spent by me doing chart review, history and exam, documentation and further activities per the note

## 2024-05-24 ENCOUNTER — PRE VISIT (OUTPATIENT)
Dept: UROLOGY | Facility: CLINIC | Age: 59
End: 2024-05-24

## 2024-05-31 ENCOUNTER — TELEPHONE (OUTPATIENT)
Dept: GASTROENTEROLOGY | Facility: CLINIC | Age: 59
End: 2024-05-31

## 2024-05-31 NOTE — TELEPHONE ENCOUNTER
Attempted to contact patient in order to complete pre assessment questions.     No answer. Left message to return call to [phone] option 4    Callback required communication sent via [employer].      Procedure details:    Patient scheduled for Upper endoscopy (EGD) with BRAVO capsule placement on 6/10/24.     Arrival time: 1215. Procedure time 1315    Facility location: [facility]; [address] [place] [address] [address], [place] [address]. Check in location: 5th Floor.    Sedation type: MAC    Pre op exam needed? N/A    Indication for procedure:     EGD BRAVO. GERD/atypical chest pain with intermittent solid dysphagia.  Please obtain at least 4 approximately 4 distal biopsies of the esophagus         Chart review:     Electronic implanted devices? No    Recent diagnosis of diverticulitis within the last 6 weeks? No    Diabetic? No      Medication review:    Anticoagulants? No    NSAIDS? No    Other medication HOLDING recommendations:  BRAVO: PPIs/Acid reducing medication(s): HOLD 2 weeks before procedure.      Prep for procedure:     Bowel prep recommendation: N/A    Prep instructions sent via [employer].       Naheed Islas RN  Endoscopy Procedure Pre Assessment

## 2024-06-03 ENCOUNTER — PATIENT OUTREACH (OUTPATIENT)
Dept: GASTROENTEROLOGY | Facility: CLINIC | Age: 59
End: 2024-06-03

## 2024-06-03 NOTE — TELEPHONE ENCOUNTER
Pre assessment completed for upcoming procedure.   (Please see previous telephone encounter notes for complete details)    Patient  returned call.       Procedure details:    Arrival time and facility location reviewed.    Pre op exam needed? N/A    Designated  policy reviewed. Instructed to have someone stay 24  hours post procedure.       Medication review:    Medications reviewed. Please see supporting documentation below. Holding recommendations discussed (if applicable).       Prep for procedure:     Procedure prep instructions reviewed.        Any additional information needed:  N/A      Patient  verbalized understanding and had no questions or concerns at this time.      Kathy Pascal RN  Endoscopy Procedure Pre Assessment   [phone] option 4

## 2024-06-05 ENCOUNTER — PATIENT OUTREACH (OUTPATIENT)
Dept: GASTROENTEROLOGY | Facility: CLINIC | Age: 59
End: 2024-06-05
Payer: COMMERCIAL

## 2024-06-07 ENCOUNTER — TELEPHONE (OUTPATIENT)
Dept: GASTROENTEROLOGY | Facility: CLINIC | Age: 59
End: 2024-06-07
Payer: COMMERCIAL

## 2024-06-07 ENCOUNTER — PATIENT OUTREACH (OUTPATIENT)
Dept: GASTROENTEROLOGY | Facility: CLINIC | Age: 59
End: 2024-06-07
Payer: COMMERCIAL

## 2024-06-07 NOTE — TELEPHONE ENCOUNTER
Caller: Arminda    Reason for Reschedule/Cancellation   (please be detailed, any staff messages or encounters to note?): Patient-patient declined to give a reason    Prior to reschedule please review:  Ordering Provider: Kenroy  Sedation Determined: MAC  Does patient have any ASC Exclusions, please identify?: N      Notes on Cancelled Procedure:  Procedure: Upper Endoscopy with BRAVO [EGD BRAVO]   Date: 6/10/24  Location: [facility]; [address] [place] [address], [address] Floor, [place] [address]  Surgeon: Kevin      Rescheduled: No, Patient declined rescheduling and did not wish to leave a reason for cancel       Did you cancel or rescheduled an EUS procedure? No.

## 2024-08-02 ENCOUNTER — DOCUMENTATION ONLY (OUTPATIENT)
Dept: UROLOGY | Facility: CLINIC | Age: 59
End: 2024-08-02
Payer: COMMERCIAL

## 2024-08-02 NOTE — PROGRESS NOTES
Type of Form Received: Order (pouch, adhesive remover wipes)    Form Received (Date) 8/2/24   Form Filled out Yes, date 8/2/24   Placed in provider folder Yes       Received Completed forms Yes   Faxed Forms Faxed To: [facility]  Fax Number: [phone]   Sent to HIM (Date) 8/12/24       Were Records Sent? Yes   If Yes: Where: [facility]  Fax Number: [phone]  Date: 8/12/24

## 2024-08-12 ENCOUNTER — MEDICAL CORRESPONDENCE (OUTPATIENT)
Dept: HEALTH INFORMATION MANAGEMENT | Facility: CLINIC | Age: 59
End: 2024-08-12
Payer: COMMERCIAL

## 2024-08-26 ENCOUNTER — MEDICAL CORRESPONDENCE (OUTPATIENT)
Dept: HEALTH INFORMATION MANAGEMENT | Facility: CLINIC | Age: 59
End: 2024-08-26
Payer: COMMERCIAL

## 2024-10-01 ENCOUNTER — E-VISIT (OUTPATIENT)
Dept: INTERNAL MEDICINE | Facility: CLINIC | Age: 59
End: 2024-10-01
Payer: COMMERCIAL

## 2024-10-01 DIAGNOSIS — F32.A DEPRESSION, UNSPECIFIED DEPRESSION TYPE: Primary | ICD-10-CM

## 2024-10-01 PROCEDURE — 99422 OL DIG E/M SVC 11-20 MIN: CPT

## 2024-10-02 ENCOUNTER — VIRTUAL VISIT (OUTPATIENT)
Dept: PSYCHOLOGY | Facility: CLINIC | Age: 59
End: 2024-10-02
Payer: COMMERCIAL

## 2024-10-02 DIAGNOSIS — F33.0 MDD (MAJOR DEPRESSIVE DISORDER), RECURRENT EPISODE, MILD (H): Primary | ICD-10-CM

## 2024-10-02 PROBLEM — F32.A DEPRESSION, UNSPECIFIED DEPRESSION TYPE: Status: ACTIVE | Noted: 2024-10-02

## 2024-10-02 PROCEDURE — 90791 PSYCH DIAGNOSTIC EVALUATION: CPT | Mod: 95 | Performed by: MARRIAGE & FAMILY THERAPIST

## 2024-10-02 ASSESSMENT — ANXIETY QUESTIONNAIRES
4. TROUBLE RELAXING: NEARLY EVERY DAY
3. WORRYING TOO MUCH ABOUT DIFFERENT THINGS: NEARLY EVERY DAY
IF YOU CHECKED OFF ANY PROBLEMS ON THIS QUESTIONNAIRE, HOW DIFFICULT HAVE THESE PROBLEMS MADE IT FOR YOU TO DO YOUR WORK, TAKE CARE OF THINGS AT HOME, OR GET ALONG WITH OTHER PEOPLE: VERY DIFFICULT
5. BEING SO RESTLESS THAT IT IS HARD TO SIT STILL: NEARLY EVERY DAY
2. NOT BEING ABLE TO STOP OR CONTROL WORRYING: NEARLY EVERY DAY
GAD7 TOTAL SCORE: 19
7. FEELING AFRAID AS IF SOMETHING AWFUL MIGHT HAPPEN: MORE THAN HALF THE DAYS
7. FEELING AFRAID AS IF SOMETHING AWFUL MIGHT HAPPEN: MORE THAN HALF THE DAYS
GAD7 TOTAL SCORE: 19
GAD7 TOTAL SCORE: 19
8. IF YOU CHECKED OFF ANY PROBLEMS, HOW DIFFICULT HAVE THESE MADE IT FOR YOU TO DO YOUR WORK, TAKE CARE OF THINGS AT HOME, OR GET ALONG WITH OTHER PEOPLE?: VERY DIFFICULT
1. FEELING NERVOUS, ANXIOUS, OR ON EDGE: NEARLY EVERY DAY
6. BECOMING EASILY ANNOYED OR IRRITABLE: MORE THAN HALF THE DAYS

## 2024-10-02 ASSESSMENT — COLUMBIA-SUICIDE SEVERITY RATING SCALE - C-SSRS
ATTEMPT LIFETIME: NO
2. HAVE YOU ACTUALLY HAD ANY THOUGHTS OF KILLING YOURSELF?: NO
TOTAL  NUMBER OF INTERRUPTED ATTEMPTS LIFETIME: NO
6. HAVE YOU EVER DONE ANYTHING, STARTED TO DO ANYTHING, OR PREPARED TO DO ANYTHING TO END YOUR LIFE?: NO
TOTAL  NUMBER OF ABORTED OR SELF INTERRUPTED ATTEMPTS LIFETIME: NO
1. IN THE PAST MONTH, HAVE YOU WISHED YOU WERE DEAD OR WISHED YOU COULD GO TO SLEEP AND NOT WAKE UP?: YES
1. HAVE YOU WISHED YOU WERE DEAD OR WISHED YOU COULD GO TO SLEEP AND NOT WAKE UP?: YES

## 2024-10-02 ASSESSMENT — PATIENT HEALTH QUESTIONNAIRE - PHQ9
SUM OF ALL RESPONSES TO PHQ QUESTIONS 1-9: 18
10. IF YOU CHECKED OFF ANY PROBLEMS, HOW DIFFICULT HAVE THESE PROBLEMS MADE IT FOR YOU TO DO YOUR WORK, TAKE CARE OF THINGS AT HOME, OR GET ALONG WITH OTHER PEOPLE: VERY DIFFICULT
SUM OF ALL RESPONSES TO PHQ QUESTIONS 1-9: 18
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
## 2024-10-07 PROBLEM — F33.0 MDD (MAJOR DEPRESSIVE DISORDER), RECURRENT EPISODE, MILD (H): Status: ACTIVE | Noted: 2024-10-07

## 2024-10-11 ENCOUNTER — ANCILLARY PROCEDURE (OUTPATIENT)
Dept: MAMMOGRAPHY | Facility: CLINIC | Age: 59
End: 2024-10-11
Attending: FAMILY MEDICINE
Payer: COMMERCIAL

## 2024-10-11 DIAGNOSIS — Z12.31 VISIT FOR SCREENING MAMMOGRAM: ICD-10-CM

## 2024-10-11 PROCEDURE — 77063 BREAST TOMOSYNTHESIS BI: CPT | Mod: TC | Performed by: RADIOLOGY

## 2024-10-11 PROCEDURE — 77067 SCR MAMMO BI INCL CAD: CPT | Mod: TC | Performed by: RADIOLOGY

## 2024-10-16 ENCOUNTER — VIRTUAL VISIT (OUTPATIENT)
Dept: PSYCHOLOGY | Facility: CLINIC | Age: 59
End: 2024-10-16
Payer: COMMERCIAL

## 2024-10-16 DIAGNOSIS — F33.0 MDD (MAJOR DEPRESSIVE DISORDER), RECURRENT EPISODE, MILD (H): Primary | ICD-10-CM

## 2024-10-16 PROCEDURE — 90834 PSYTX W PT 45 MINUTES: CPT | Mod: 95 | Performed by: MARRIAGE & FAMILY THERAPIST
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
## 2024-10-30 ENCOUNTER — VIRTUAL VISIT (OUTPATIENT)
Dept: PSYCHOLOGY | Facility: CLINIC | Age: 59
End: 2024-10-30
Payer: COMMERCIAL

## 2024-10-30 DIAGNOSIS — F33.0 MDD (MAJOR DEPRESSIVE DISORDER), RECURRENT EPISODE, MILD (H): Primary | ICD-10-CM

## 2024-10-30 PROCEDURE — 90834 PSYTX W PT 45 MINUTES: CPT | Mod: 95 | Performed by: MARRIAGE & FAMILY THERAPIST

## 2024-10-30 NOTE — PROGRESS NOTES
[facility]                                     Progress Note    Patient Name: David Grissom  Date: 10/30/24         Service Type: Individual      Session Start Time: 7:04a  Session End Time: 7:54a     Session Length: 50    Session #: 3    Attendees: Client attended alone    Service Modality:  Video Visit:      Provider verified identity through the following two step process.  Patient provided:  Patient     Telemedicine Visit: The patient's condition can be safely assessed and treated via synchronous audio and visual telemedicine encounter.      Reason for Telemedicine Visit: Services only offered telehealth    Originating Site (Patient Location): Patient's home    Distant Site (Provider Location): Provider Remote Setting- Home Office    Consent:  The patient/guardian has verbally consented to: the potential risks and benefits of telemedicine (video visit) versus in person care; bill my insurance or make self-payment for services provided; and responsibility for payment of non-covered services.     Patient would like the video invitation sent by:  Send to e-mail at: [email or web address]    Mode of Communication:  Video Conference via [employer]well    Distant Location (Provider):  Off-site    As the provider I attest to compliance with applicable laws and regulations related to telemedicine.    DATA  Interactive Complexity: No  Crisis: No        Progress Since Last Session (Related to Symptoms / Goals / Homework):   Symptoms: no change    Homework: Completed in session      Episode of Care Goals: Minimal progress - ACTION (Actively working towards change); Intervened by reinforcing change plan / affirming steps taken     Current / Ongoing Stressors and Concerns:   Last session 10/16, overall things have been about the same over the past weeks. No major changes in the home with , pt working to be more assertive with her communication.      Treatment Objective(s) Addressed in This  Session:   Increase interest, engagement, and pleasure in doing things       Intervention:   Motivational Interviewing    MI Intervention: Permission to raise concern or advise and Open-ended questions     Change Talk Expressed by the Patient: Ability to change Reasons to change    Provider Response to Change Talk: E - Evoked more info from patient about behavior change and A - Affirmed patient's thoughts, decisions, or attempts at behavior change         ASSESSMENT: Current Emotional / Mental Status (status of significant symptoms):   Risk status (Self / Other harm or suicidal ideation)   Patient denies current fears or concerns for personal safety.   Patient denies current or recent suicidal ideation or behaviors.   Patient denies current or recent homicidal ideation or behaviors.   Patient denies current or recent self injurious behavior or ideation.   Patient denies other safety concerns.   Patient reports there has been no change in risk factors since their last session.     Patient reports there has been no change in protective factors since their last session.     Recommended that patient call 911 or go to the local ED should there be a change in any of these risk factors.     Appearance:   Appropriate    Eye Contact:   Good    Psychomotor Behavior: Normal    Attitude:   Cooperative    Orientation:   All   Speech    Rate / Production: Normal     Volume:  Normal    Mood:    Normal   Affect:    Appropriate    Thought Content:  Clear    Thought Form:  Coherent  Logical    Insight:    Good      Medication Review:   No current psychiatric medications prescribed     Medication Compliance:   NA     Changes in Health Issues:   None reported     Chemical Use Review:   Substance Use: Chemical use reviewed, no active concerns identified      Tobacco Use: No current tobacco use.      Diagnosis:  MDD, mild, recurrent    Collateral Reports Completed:   Not Applicable    PLAN: (Patient Tasks / Therapist Tasks /  Other)  [place] setting with     Cosme MARISA Ernst  10/30/24                                                         ______________________________________________________________________    Individual Treatment Plan    Patient's Name: David Grissom  YOB: 1965    Date of Creation: 10/16/24  Date Treatment Plan Last Reviewed/Revised: 1/16/25    DSM5 Diagnoses: 296.31 (F33.0) Major Depressive Disorder, Recurrent Episode, Mild _  Psychosocial / Contextual Factors:   PROMIS (reviewed every 90 days):     Referral / Collaboration:  Referral to another professional/service is not indicated at this time..    Anticipated number of session for this episode of care: 3-6 sessions  Anticipation frequency of session: Biweekly  Anticipated Duration of each session: 38-52 minutes  Treatment plan will be reviewed in 90 days or when goals have been changed.       MeasurableTreatment Goal(s) related to diagnosis / functional impairment(s)  Goal 1: Patient will engage in using CBT skills for depression improvement    Objective #A (Patient Action)    Patient will Identify negative self-talk and behaviors: challenge core beliefs, myths, and actions.  Status: New - Date: 10/16/24      Intervention(s)  Therapist will teach emotional recognition/identification.   .        Patient has reviewed and agreed to the above plan.      MARISA Ashby  October 16, 2024

## 2024-11-12 ASSESSMENT — ANXIETY QUESTIONNAIRES
6. BECOMING EASILY ANNOYED OR IRRITABLE: SEVERAL DAYS
GAD7 TOTAL SCORE: 13
3. WORRYING TOO MUCH ABOUT DIFFERENT THINGS: MORE THAN HALF THE DAYS
4. TROUBLE RELAXING: MORE THAN HALF THE DAYS
5. BEING SO RESTLESS THAT IT IS HARD TO SIT STILL: MORE THAN HALF THE DAYS
GAD7 TOTAL SCORE: 13
GAD7 TOTAL SCORE: 13
8. IF YOU CHECKED OFF ANY PROBLEMS, HOW DIFFICULT HAVE THESE MADE IT FOR YOU TO DO YOUR WORK, TAKE CARE OF THINGS AT HOME, OR GET ALONG WITH OTHER PEOPLE?: SOMEWHAT DIFFICULT
1. FEELING NERVOUS, ANXIOUS, OR ON EDGE: MORE THAN HALF THE DAYS
7. FEELING AFRAID AS IF SOMETHING AWFUL MIGHT HAPPEN: MORE THAN HALF THE DAYS
IF YOU CHECKED OFF ANY PROBLEMS ON THIS QUESTIONNAIRE, HOW DIFFICULT HAVE THESE PROBLEMS MADE IT FOR YOU TO DO YOUR WORK, TAKE CARE OF THINGS AT HOME, OR GET ALONG WITH OTHER PEOPLE: SOMEWHAT DIFFICULT
2. NOT BEING ABLE TO STOP OR CONTROL WORRYING: MORE THAN HALF THE DAYS
7. FEELING AFRAID AS IF SOMETHING AWFUL MIGHT HAPPEN: MORE THAN HALF THE DAYS

## 2024-11-12 ASSESSMENT — PATIENT HEALTH QUESTIONNAIRE - PHQ9
SUM OF ALL RESPONSES TO PHQ QUESTIONS 1-9: 13
10. IF YOU CHECKED OFF ANY PROBLEMS, HOW DIFFICULT HAVE THESE PROBLEMS MADE IT FOR YOU TO DO YOUR WORK, TAKE CARE OF THINGS AT HOME, OR GET ALONG WITH OTHER PEOPLE: SOMEWHAT DIFFICULT
SUM OF ALL RESPONSES TO PHQ QUESTIONS 1-9: 13

## 2024-11-13 ENCOUNTER — VIRTUAL VISIT (OUTPATIENT)
Dept: PSYCHOLOGY | Facility: CLINIC | Age: 59
End: 2024-11-13
Payer: COMMERCIAL

## 2024-11-13 DIAGNOSIS — F33.0 MDD (MAJOR DEPRESSIVE DISORDER), RECURRENT EPISODE, MILD (H): Primary | ICD-10-CM

## 2024-11-13 PROCEDURE — 90834 PSYTX W PT 45 MINUTES: CPT | Mod: 95 | Performed by: MARRIAGE & FAMILY THERAPIST

## 2024-11-13 ASSESSMENT — COLUMBIA-SUICIDE SEVERITY RATING SCALE - C-SSRS
6. HAVE YOU EVER DONE ANYTHING, STARTED TO DO ANYTHING, OR PREPARED TO DO ANYTHING TO END YOUR LIFE?: NO
TOTAL  NUMBER OF ABORTED OR SELF INTERRUPTED ATTEMPTS SINCE LAST CONTACT: NO
1. SINCE LAST CONTACT, HAVE YOU WISHED YOU WERE DEAD OR WISHED YOU COULD GO TO SLEEP AND NOT WAKE UP?: YES
ATTEMPT SINCE LAST CONTACT: NO
2. HAVE YOU ACTUALLY HAD ANY THOUGHTS OF KILLING YOURSELF?: NO
TOTAL  NUMBER OF INTERRUPTED ATTEMPTS SINCE LAST CONTACT: NO

## 2024-11-13 NOTE — PROGRESS NOTES
Answers submitted by the patient for this visit:  Patient Health Questionnaire (Submitted on 2024)  If you checked off any problems, how difficult have these problems made it for you to do your work, take care of things at home, or get along with other people?: Somewhat difficult  PHQ9 TOTAL SCORE: 13  Patient Health Questionnaire (G7) (Submitted on 2024)  ALESHIA 7 TOTAL SCORE: 13        [facility]                                     Progress Note    Patient Name: David Grissom  Date: 24         Service Type: Individual      Session Start Time: 11:32a Session End Time:     Session Length:     Session #: 4    Attendees: Client attended alone    Service Modality:  Video Visit:      Provider verified identity through the following two step process.  Patient provided:  Patient     Telemedicine Visit: The patient's condition can be safely assessed and treated via synchronous audio and visual telemedicine encounter.      Reason for Telemedicine Visit: Services only offered telehealth    Originating Site (Patient Location): Patient's home    Distant Site (Provider Location): Provider Remote Setting- Home Office    Consent:  The patient/guardian has verbally consented to: the potential risks and benefits of telemedicine (video visit) versus in person care; bill my insurance or make self-payment for services provided; and responsibility for payment of non-covered services.     Patient would like the video invitation sent by:  Send to e-mail at: [email or web address]    Mode of Communication:  Video Conference via Amwell    Distant Location (Provider):  Off-site    As the provider I attest to compliance with applicable laws and regulations related to telemedicine.    DATA  Interactive Complexity: No  Crisis: No        Progress Since Last Session (Related to Symptoms / Goals / Homework):   Symptoms:     Homework: Completed in session      Episode of Care Goals: Minimal progress -  ACTION (Actively working towards change); Intervened by reinforcing change plan / affirming steps taken     Current / Ongoing Stressors and Concerns:   Last session 10/30, overall things have been going ok, handled 1 yr anniversary of mother's passing well.      Treatment Objective(s) Addressed in This Session:   Increase interest, engagement, and pleasure in doing things       Intervention:   Motivational Interviewing    MI Intervention: Permission to raise concern or advise and Open-ended questions     Change Talk Expressed by the Patient: Ability to change Reasons to change    Provider Response to Change Talk: E - Evoked more info from patient about behavior change and A - Affirmed patient's thoughts, decisions, or attempts at behavior change         ASSESSMENT: Current Emotional / Mental Status (status of significant symptoms):   Risk status (Self / Other harm or suicidal ideation)   Patient denies current fears or concerns for personal safety.   Patient denies current or recent suicidal ideation or behaviors.   Patient denies current or recent homicidal ideation or behaviors.   Patient denies current or recent self injurious behavior or ideation.   Patient denies other safety concerns.   Patient reports there has been no change in risk factors since their last session.     Patient reports there has been no change in protective factors since their last session.     Recommended that patient call 911 or go to the local ED should there be a change in any of these risk factors.     Appearance:   Appropriate    Eye Contact:   Good    Psychomotor Behavior: Normal    Attitude:   Cooperative    Orientation:   All   Speech    Rate / Production: Normal     Volume:  Normal    Mood:    Normal   Affect:    Appropriate    Thought Content:  Clear    Thought Form:  Coherent  Logical    Insight:    Good      Medication Review:   No current psychiatric medications prescribed     Medication Compliance:   NA     Changes in Health  Issues:   None reported     Chemical Use Review:   Substance Use: Chemical use reviewed, no active concerns identified      Tobacco Use: No current tobacco use.      Diagnosis:  MDD, mild, recurrent    Collateral Reports Completed:   Not Applicable    PLAN: (Patient Tasks / Therapist Tasks / Other)      MARISA Ashby                                                           ______________________________________________________________________    Individual Treatment Plan    Patient's Name: David Grissom  YOB: 1965    Date of Creation: 10/16/24  Date Treatment Plan Last Reviewed/Revised: 1/16/25    DSM5 Diagnoses: 296.31 (F33.0) Major Depressive Disorder, Recurrent Episode, Mild _  Psychosocial / Contextual Factors:   PROMIS (reviewed every 90 days):     Referral / Collaboration:  Referral to another professional/service is not indicated at this time..    Anticipated number of session for this episode of care: 3-6 sessions  Anticipation frequency of session: Biweekly  Anticipated Duration of each session: 38-52 minutes  Treatment plan will be reviewed in 90 days or when goals have been changed.       MeasurableTreatment Goal(s) related to diagnosis / functional impairment(s)  Goal 1: Patient will engage in using CBT skills for depression improvement    Objective #A (Patient Action)    Patient will Identify negative self-talk and behaviors: challenge core beliefs, myths, and actions.  Status: New - Date: 10/16/24      Intervention(s)  Therapist will teach emotional recognition/identification.   .        Patient has reviewed and agreed to the above plan.      MARISA Ashby  October 16, 2024

## 2024-11-19 ENCOUNTER — TELEPHONE (OUTPATIENT)
Dept: UROLOGY | Facility: CLINIC | Age: 59
End: 2024-11-19
Payer: COMMERCIAL

## 2024-11-19 NOTE — TELEPHONE ENCOUNTER
Patient confirmed scheduled appointment:  Date: 5/16  Time: 2:15  Visit type: RTN  Provider: Krishna  Location: [facility]  Testing/imaging: [facility] prior  Additional notes: NA

## 2024-12-14 SDOH — HEALTH STABILITY: PHYSICAL HEALTH: ON AVERAGE, HOW MANY DAYS PER WEEK DO YOU ENGAGE IN MODERATE TO STRENUOUS EXERCISE (LIKE A BRISK WALK)?: 4 DAYS

## 2024-12-14 SDOH — HEALTH STABILITY: PHYSICAL HEALTH: ON AVERAGE, HOW MANY MINUTES DO YOU ENGAGE IN EXERCISE AT THIS LEVEL?: 30 MIN

## 2024-12-14 ASSESSMENT — SOCIAL DETERMINANTS OF HEALTH (SDOH): HOW OFTEN DO YOU GET TOGETHER WITH FRIENDS OR RELATIVES?: ONCE A WEEK

## 2024-12-17 ASSESSMENT — PATIENT HEALTH QUESTIONNAIRE - PHQ9
10. IF YOU CHECKED OFF ANY PROBLEMS, HOW DIFFICULT HAVE THESE PROBLEMS MADE IT FOR YOU TO DO YOUR WORK, TAKE CARE OF THINGS AT HOME, OR GET ALONG WITH OTHER PEOPLE: SOMEWHAT DIFFICULT
SUM OF ALL RESPONSES TO PHQ QUESTIONS 1-9: 7
SUM OF ALL RESPONSES TO PHQ QUESTIONS 1-9: 7

## 2024-12-18 ENCOUNTER — OFFICE VISIT (OUTPATIENT)
Dept: INTERNAL MEDICINE | Facility: CLINIC | Age: 59
End: 2024-12-18
Payer: COMMERCIAL

## 2024-12-18 VITALS
WEIGHT: 116 LBS | HEART RATE: 70 BPM | DIASTOLIC BLOOD PRESSURE: 81 MMHG | SYSTOLIC BLOOD PRESSURE: 136 MMHG | HEIGHT: 60 IN | OXYGEN SATURATION: 99 % | BODY MASS INDEX: 22.78 KG/M2 | TEMPERATURE: 97.4 F | RESPIRATION RATE: 16 BRPM

## 2024-12-18 DIAGNOSIS — Z00.00 ENCOUNTER FOR MEDICARE ANNUAL WELLNESS EXAM: Primary | ICD-10-CM

## 2024-12-18 DIAGNOSIS — Z13.220 SCREENING FOR HYPERLIPIDEMIA: ICD-10-CM

## 2024-12-18 DIAGNOSIS — Z13.29 SCREENING FOR ENDOCRINE DISORDER: ICD-10-CM

## 2024-12-18 DIAGNOSIS — R53.83 OTHER FATIGUE: ICD-10-CM

## 2024-12-18 DIAGNOSIS — Z13.6 SCREENING FOR CARDIOVASCULAR CONDITION: ICD-10-CM

## 2024-12-18 DIAGNOSIS — E56.9 VITAMIN DEFICIENCY: ICD-10-CM

## 2024-12-18 DIAGNOSIS — N18.31 STAGE 3A CHRONIC KIDNEY DISEASE (H): ICD-10-CM

## 2024-12-18 LAB
ALBUMIN SERPL BCG-MCNC: 3.9 G/DL (ref 3.5–5.2)
ALP SERPL-CCNC: 78 U/L (ref 40–150)
ALT SERPL W P-5'-P-CCNC: 17 U/L (ref 0–50)
ANION GAP SERPL CALCULATED.3IONS-SCNC: 8 MMOL/L (ref 7–15)
AST SERPL W P-5'-P-CCNC: 27 U/L (ref 0–45)
BASOPHILS # BLD AUTO: 0.1 10E3/UL (ref 0–0.2)
BASOPHILS NFR BLD AUTO: 1 %
BILIRUB SERPL-MCNC: 0.3 MG/DL
BUN SERPL-MCNC: 20.7 MG/DL (ref 8–23)
CALCIUM SERPL-MCNC: 9.5 MG/DL (ref 8.8–10.4)
CHLORIDE SERPL-SCNC: 107 MMOL/L (ref 98–107)
CHOLEST SERPL-MCNC: 190 MG/DL
CREAT SERPL-MCNC: 0.97 MG/DL (ref 0.51–0.95)
EGFRCR SERPLBLD CKD-EPI 2021: 67 ML/MIN/1.73M2
EOSINOPHIL # BLD AUTO: 0.6 10E3/UL (ref 0–0.7)
EOSINOPHIL NFR BLD AUTO: 10 %
ERYTHROCYTE [DISTWIDTH] IN BLOOD BY AUTOMATED COUNT: 12.9 % (ref 10–15)
FASTING STATUS PATIENT QL REPORTED: YES
FASTING STATUS PATIENT QL REPORTED: YES
GLUCOSE SERPL-MCNC: 83 MG/DL (ref 70–99)
HCO3 SERPL-SCNC: 27 MMOL/L (ref 22–29)
HCT VFR BLD AUTO: 39.6 % (ref 35–47)
HDLC SERPL-MCNC: 71 MG/DL
HGB BLD-MCNC: 13.4 G/DL (ref 11.7–15.7)
IMM GRANULOCYTES # BLD: 0 10E3/UL
IMM GRANULOCYTES NFR BLD: 0 %
LDLC SERPL CALC-MCNC: 108 MG/DL
LYMPHOCYTES # BLD AUTO: 2.2 10E3/UL (ref 0.8–5.3)
LYMPHOCYTES NFR BLD AUTO: 37 %
MCH RBC QN AUTO: 31.5 PG (ref 26.5–33)
MCHC RBC AUTO-ENTMCNC: 33.8 G/DL (ref 31.5–36.5)
MCV RBC AUTO: 93 FL (ref 78–100)
MONOCYTES # BLD AUTO: 0.3 10E3/UL (ref 0–1.3)
MONOCYTES NFR BLD AUTO: 5 %
NEUTROPHILS # BLD AUTO: 2.7 10E3/UL (ref 1.6–8.3)
NEUTROPHILS NFR BLD AUTO: 47 %
NONHDLC SERPL-MCNC: 119 MG/DL
PLATELET # BLD AUTO: 304 10E3/UL (ref 150–450)
POTASSIUM SERPL-SCNC: 4.2 MMOL/L (ref 3.4–5.3)
PROT SERPL-MCNC: 6.7 G/DL (ref 6.4–8.3)
RBC # BLD AUTO: 4.25 10E6/UL (ref 3.8–5.2)
SODIUM SERPL-SCNC: 142 MMOL/L (ref 135–145)
TRIGL SERPL-MCNC: 54 MG/DL
TSH SERPL DL<=0.005 MIU/L-ACNC: 0.9 UIU/ML (ref 0.3–4.2)
VIT D+METAB SERPL-MCNC: 76 NG/ML (ref 20–50)
WBC # BLD AUTO: 5.8 10E3/UL (ref 4–11)

## 2024-12-18 PROCEDURE — 82306 VITAMIN D 25 HYDROXY: CPT

## 2024-12-18 PROCEDURE — G0438 PPPS, INITIAL VISIT: HCPCS

## 2024-12-18 PROCEDURE — 85025 COMPLETE CBC W/AUTO DIFF WBC: CPT

## 2024-12-18 PROCEDURE — 80053 COMPREHEN METABOLIC PANEL: CPT

## 2024-12-18 PROCEDURE — 80061 LIPID PANEL: CPT

## 2024-12-18 PROCEDURE — 36415 COLL VENOUS BLD VENIPUNCTURE: CPT

## 2024-12-18 PROCEDURE — 84443 ASSAY THYROID STIM HORMONE: CPT

## 2024-12-18 NOTE — PATIENT INSTRUCTIONS
Patient Education   Preventive Care Advice   This is general advice given by our system to help you stay healthy. However, your care team may have specific advice just for you. Please talk to your care team about your preventive care needs.  Nutrition  Eat 5 or more servings of fruits and vegetables each day.  Try wheat bread, brown rice and whole grain pasta (instead of white bread, rice, and pasta).  Get enough calcium and vitamin D. Check the label on foods and aim for 100% of the RDA (recommended daily allowance).  Lifestyle  Exercise at least 150 minutes each week  (30 minutes a day, 5 days a week).  Do muscle strengthening activities 2 days a week. These help control your weight and prevent disease.  No smoking.  Wear sunscreen to prevent skin cancer.  Have a dental exam and cleaning every 6 months.  Yearly exams  See your health care team every year to talk about:  Any changes in your health.  Any medicines your care team has prescribed.  Preventive care, family planning, and ways to prevent chronic diseases.  Shots (vaccines)   HPV shots (up to age 26), if you've never had them before.  Hepatitis B shots (up to age 59), if you've never had them before.  COVID-19 shot: Get this shot when it's due.  Flu shot: Get a flu shot every year.  Tetanus shot: Get a tetanus shot every 10 years.  Pneumococcal, hepatitis A, and RSV shots: Ask your care team if you need these based on your risk.  Shingles shot (for age 50 and up)  General health tests  Diabetes screening:  Starting at age 35, Get screened for diabetes at least every 3 years.  If you are younger than age 35, ask your care team if you should be screened for diabetes.  Cholesterol test: At age 39, start having a cholesterol test every 5 years, or more often if advised.  Bone density scan (DEXA): At age 50, ask your care team if you should have this scan for osteoporosis (brittle bones).  Hepatitis C: Get tested at least once in your life.  STIs (sexually  transmitted infections)  Before age 24: Ask your care team if you should be screened for STIs.  After age 24: Get screened for STIs if you're at risk. You are at risk for STIs (including HIV) if:  You are sexually active with more than one person.  You don't use condoms every time.  You or a partner was diagnosed with a sexually transmitted infection.  If you are at risk for HIV, ask about PrEP medicine to prevent HIV.  Get tested for HIV at least once in your life, whether you are at risk for HIV or not.  Cancer screening tests  Cervical cancer screening: If you have a cervix, begin getting regular cervical cancer screening tests starting at age 21.  Breast cancer scan (mammogram): If you've ever had breasts, begin having regular mammograms starting at age 40. This is a scan to check for breast cancer.  Colon cancer screening: It is important to start screening for colon cancer at age 45.  Have a colonoscopy test every 10 years (or more often if you're at risk) Or, ask your provider about stool tests like a FIT test every year or Cologuard test every 3 years.  To learn more about your testing options, visit:   .  For help making a decision, visit:   https://bit.ly/jv99520.  Prostate cancer screening test: If you have a prostate, ask your care team if a prostate cancer screening test (PSA) at age 55 is right for you.  Lung cancer screening: If you are a current or former smoker ages 50 to 80, ask your care team if ongoing lung cancer screenings are right for you.  For informational purposes only. Not to replace the advice of your health care provider. Copyright   2023 [facility]. All rights reserved. Clinically reviewed by the [facility]itions [facility]. [employer] 270506 - REV 01/24.  Learning About Stress  What is stress?     Stress is your body's response to a hard situation. Your body can have a physical, emotional, or mental response. Stress is a fact of life for most people, and it  affects everyone differently. What causes stress for you may not be stressful for someone else.  A lot of things can cause stress. You may feel stress when you go on a job interview, take a test, or run a race. This kind of short-term stress is normal and even useful. It can help you if you need to work hard or react quickly. For example, stress can help you finish an important job on time.  Long-term stress is caused by ongoing stressful situations or events. Examples of long-term stress include long-term health problems, ongoing problems at work, or conflicts in your family. Long-term stress can harm your health.  How does stress affect your health?  When you are stressed, your body responds as though you are in danger. It makes hormones that speed up your heart, make you breathe faster, and give you a burst of energy. This is called the fight-or-flight stress response. If the stress is over quickly, your body goes back to normal and no harm is done.  But if stress happens too often or lasts too long, it can have bad effects. Long-term stress can make you more likely to get sick, and it can make symptoms of some diseases worse. If you tense up when you are stressed, you may develop neck, shoulder, or low back pain. Stress is linked to high blood pressure and heart disease.  Stress also harms your emotional health. It can make you alaniz, tense, or depressed. Your relationships may suffer, and you may not do well at work or school.  What can you do to manage stress?  You can try these things to help manage stress:   Do something active. Exercise or activity can help reduce stress. Walking is a great way to get started. Even everyday activities such as housecleaning or yard work can help.  Try yoga or kim chi. These techniques combine exercise and meditation. You may need some training at first to learn them.  Do something you enjoy. For example, listen to music or go to a movie. Practice your hobby or do volunteer  "work.  Meditate. This can help you relax, because you are not worrying about what happened before or what may happen in the future.  Do guided imagery. Imagine yourself in any setting that helps you feel calm. You can use online videos, books, or a teacher to guide you.  Do breathing exercises. For example:  From a standing position, bend forward from the waist with your knees slightly bent. Let your arms dangle close to the floor.  Breathe in slowly and deeply as you return to a standing position. Roll up slowly and lift your head last.  Hold your breath for just a few seconds in the standing position.  Breathe out slowly and bend forward from the waist.  Let your feelings out. Talk, laugh, cry, and express anger when you need to. Talking with supportive friends or family, a counselor, or a maru leader about your feelings is a healthy way to relieve stress. Avoid discussing your feelings with people who make you feel worse.  Write. It may help to write about things that are bothering you. This helps you find out how much stress you feel and what is causing it. When you know this, you can find better ways to cope.  What can you do to prevent stress?  You might try some of these things to help prevent stress:  Manage your time. This helps you find time to do the things you want and need to do.  Get enough sleep. Your body recovers from the stresses of the day while you are sleeping.  Get support. Your family, friends, and community can make a difference in how you experience stress.  Limit your news feed. Avoid or limit time on social media or news that may make you feel stressed.  Do something active. Exercise or activity can help reduce stress. Walking is a great way to get started.  Where can you learn more?  Go to https://www.[employer].net/patiented  Enter N032 in the search box to learn more about \"Learning About Stress.\"  Current as of: October 24, 2023  Content Version: 14.3    2024 [employer]. "   Care instructions adapted under license by your healthcare professional. If you have questions about a medical condition or this instruction, always ask your healthcare professional. [employer] disclaims any warranty or liability for your use of this information.    Learning About Sleeping Well  What does sleeping well mean?     Sleeping well means getting enough sleep to feel good and stay healthy. How much sleep is enough varies among people.  The number of hours you sleep and how you feel when you wake up are both important. If you do not feel refreshed, you probably need more sleep. Another sign of not getting enough sleep is feeling tired during the day.  Experts recommend that adults get at least 7 or more hours of sleep per day. Children and older adults need more sleep.  Why is getting enough sleep important?  Getting enough quality sleep is a basic part of good health. When your sleep suffers, your physical health, mood, and your thoughts can suffer too. You may find yourself feeling more grumpy or stressed. Not getting enough sleep also can lead to serious problems, including injury, accidents, anxiety, and depression.  What might cause poor sleeping?  Many things can cause sleep problems, including:  Changes to your sleep schedule.  Stress. Stress can be caused by fear about a single event, such as giving a speech. Or you may have ongoing stress, such as worry about work or school.  Depression, anxiety, and other mental or emotional conditions.  Changes in your sleep habits or surroundings. This includes changes that happen where you sleep, such as noise, light, or sleeping in a different bed. It also includes changes in your sleep pattern, such as having jet lag or working a late shift.  Health problems, such as pain, breathing problems, and restless legs syndrome.  Lack of regular exercise.  Using alcohol, nicotine, or caffeine before bed.  How can you help yourself?  Here are some tips  "that may help you sleep more soundly and wake up feeling more refreshed.  Your sleeping area   Use your bedroom only for sleeping and sex. A bit of light reading may help you fall asleep. But if it doesn't, do your reading elsewhere in the house. Try not to use your TV, computer, smartphone, or tablet while you are in bed.  Be sure your bed is big enough to stretch out comfortably, especially if you have a sleep partner.  Keep your bedroom quiet, dark, and cool. Use curtains, blinds, or a sleep mask to block out light. To block out noise, use earplugs, soothing music, or a \"white noise\" machine.  Your evening and bedtime routine   Create a relaxing bedtime routine. You might want to take a warm shower or bath, or listen to soothing music.  Go to bed at the same time every night. And get up at the same time every morning, even if you feel tired.  What to avoid   Limit caffeine (coffee, tea, caffeinated sodas) during the day, and don't have any for at least 6 hours before bedtime.  Avoid drinking alcohol before bedtime. Alcohol can cause you to wake up more often during the night.  Try not to smoke or use tobacco, especially in the evening. Nicotine can keep you awake.  Limit naps during the day, especially close to bedtime.  Avoid lying in bed awake for too long. If you can't fall asleep or if you wake up in the middle of the night and can't get back to sleep within about 20 minutes, get out of bed and go to another room until you feel sleepy.  Avoid taking medicine right before bed that may keep you awake or make you feel hyper or energized. Your doctor can tell you if your medicine may do this and if you can take it earlier in the day.  If you can't sleep   Imagine yourself in a peaceful, pleasant scene. Focus on the details and feelings of being in a place that is relaxing.  Get up and do a quiet or boring activity until you feel sleepy.  Avoid drinking any liquids before going to bed to help prevent waking up " "often to use the bathroom.  Where can you learn more?  Go to https://www.[employer].net/patiented  Enter J942 in the search box to learn more about \"Learning About Sleeping Well.\"  Current as of: July 31, 2024  Content Version: 14.3    2024 [employer].   Care instructions adapted under license by your healthcare professional. If you have questions about a medical condition or this instruction, always ask your healthcare professional. [employer] disclaims any warranty or liability for your use of this information.    Learning About Depression Screening  What is depression screening?  Depression screening is a way to see if you have depression symptoms. It may be done by a doctor or counselor. It's often part of a routine checkup. That's because your mental health is just as important as your physical health.  Depression is a mental health condition that affects how you feel, think, and act. You may:  Have less energy.  Lose interest in your daily activities.  Feel sad and grouchy for a long time.  Depression is very common. It affects people of all ages.  Many things can lead to depression. Some people become depressed after they have a stroke or find out they have a major illness like cancer or heart disease. The death of a loved one or a breakup may lead to depression. It can run in families. Most experts believe that a combination of inherited genes and stressful life events can cause it.  What happens during screening?  You may be asked to fill out a form about your depression symptoms. You and the doctor will discuss your answers. The doctor may ask you more questions to learn more about how you think, act, and feel.  What happens after screening?  If you have symptoms of depression, your doctor will talk to you about your options.  Doctors usually treat depression with medicines or counseling. Often, combining the two works best. Many people don't get help because they think that they'll " "get over the depression on their own. But people with depression may not get better unless they get treatment.  The cause of depression is not well understood. There may be many factors involved. But if you have depression, it's not your fault.  A serious symptom of depression is thinking about death or suicide. If you or someone you care about talks about this or about feeling hopeless, get help right away.  It's important to know that depression can be treated. Medicine, counseling, and self-care may help.  Where can you learn more?  Go to https://www.[employer].net/patiented  Enter T185 in the search box to learn more about \"Learning About Depression Screening.\"  Current as of: July 31, 2024  Content Version: 14.3    2024 [employer].   Care instructions adapted under license by your healthcare professional. If you have questions about a medical condition or this instruction, always ask your healthcare professional. [employer] disclaims any warranty or liability for your use of this information.    Substance Use Disorder: Care Instructions  Overview     You can improve your life and health by stopping your use of alcohol or drugs. When you don't drink or use drugs, you may feel and sleep better. You may get along better with your family, friends, and coworkers. There are medicines and programs that can help with substance use disorder.  How can you care for yourself at home?  Here are some ways to help you stay sober and prevent relapse.  If you have been given medicine to help keep you sober or reduce your cravings, be sure to take it exactly as prescribed.  Talk to your doctor about programs that can help you stop using drugs or drinking alcohol.  Do not keep alcohol or drugs in your home.  Plan ahead. Think about what you'll say if other people ask you to drink or use drugs. Try not to spend time with people who drink or use drugs.  Use the time and money spent on drinking or drugs to do " something that's important to you.  Preventing a relapse  Have a plan to deal with relapse. Learn to recognize changes in your thinking that lead you to drink or use drugs. Get help before you start to drink or use drugs again.  Try to stay away from situations, friends, or places that may lead you to drink or use drugs.  If you feel the need to drink alcohol or use drugs again, seek help right away. Call a trusted friend or family member. Some people get support from organizations such as Narcotics Anonymous or [employer] or from treatment facilities.  If you relapse, get help as soon as you can. Some people make a plan with another person that outlines what they want that person to do for them if they relapse. The plan usually includes how to handle the relapse and who to notify in case of relapse.  Don't give up. Remember that a relapse doesn't mean that you have failed. Use the experience to learn the triggers that lead you to drink or use drugs. Then quit again. Recovery is a lifelong process. Many people have several relapses before they are able to quit for good.  Follow-up care is a key part of your treatment and safety. Be sure to make and go to all appointments, and call your doctor if you are having problems. It's also a good idea to know your test results and keep a list of the medicines you take.  When should you call for help?   Call 911  anytime you think you may need emergency care. For example, call if you or someone else:    Has overdosed or has withdrawal signs. Be sure to tell the emergency workers that you are or someone else is using or trying to quit using drugs. Overdose or withdrawal signs may include:  Losing consciousness.  Seizure.  Seeing or hearing things that aren't there (hallucinations).     Is thinking or talking about suicide or harming others.   Where to get help 24 hours a day, 7 days a week   If you or someone you know talks about suicide, self-harm, a mental health crisis,  "a substance use crisis, or any other kind of emotional distress, get help right away. You can:    Call the Suicide and Crisis Lifeline at 988.     Call 1-799-889-KPCB (1-162.135.6675).     Text HOME to 716997 to access the Crisis Text Line.   Consider saving these numbers in your phone.  Go to [employer] for more information or to chat online.  Call your doctor now or seek immediate medical care if:    You are having withdrawal symptoms. These may include nausea or vomiting, sweating, shakiness, and anxiety.   Watch closely for changes in your health, and be sure to contact your doctor if:    You have a relapse.     You need more help or support to stop.   Where can you learn more?  Go to https://www.[employer].net/patiented  Enter H573 in the search box to learn more about \"Substance Use Disorder: Care Instructions.\"  Current as of: November 15, 2023  Content Version: 14.3    2024 [employer].   Care instructions adapted under license by your healthcare professional. If you have questions about a medical condition or this instruction, always ask your healthcare professional. [employer] disclaims any warranty or liability for your use of this information.       "

## 2024-12-18 NOTE — PROGRESS NOTES
Preventive Care Visit  [facility] YVROSE Nagy CNP, Internal Medicine  Dec 18, 2024      Assessment & Plan     (Z00.00) Encounter for Medicare annual wellness exam  (primary encounter diagnosis)  Comment: Patient seen in clinic for medicare exam. Discussed labs due this visit. No acute concerns.   Plan: REVIEW OF HEALTH MAINTENANCE PROTOCOL ORDERS            (R53.83) Other fatigue  Comment: Discussed lab this visit.   Plan: CBC with platelets and differential        WNL    (Z13.6) Screening for cardiovascular condition  Comment: Discussed recommendation to screen this visit.   Plan: Comprehensive metabolic panel (BMP + Alb, Alk         Phos, ALT, AST, Total. Bili, TP)        Pending     (Z13.29) Screening for endocrine disorder  Comment: Discussed recommendation to screen this visit.   Plan: TSH with free T4 reflex        Pending     (Z13.220) Screening for hyperlipidemia  Comment: Discussed recommendation to screen this visit.   Plan: Lipid panel reflex to direct LDL Fasting        Pending     (E56.9) Vitamin deficiency (N18.31) Stage 3a chronic kidney disease (H)  Comment: Discussed screening this visit.   Plan: Vitamin D Deficiency        Pending      Patient has been advised of split billing requirements and indicates understanding: Yes        Counseling  Appropriate preventive services were addressed with this patient via screening, questionnaire, or discussion as appropriate for fall prevention, nutrition, physical activity, Tobacco-use cessation, social engagement, weight loss and cognition.  Checklist reviewing preventive services available has been given to the patient.  Reviewed patient's diet, addressing concerns and/or questions.   She is at risk for psychosocial distress and has been provided with information to reduce risk.   Discussed possible causes of fatigue. The patient's PHQ-9 score is consistent with mild depression. She was provided with information regarding  depression.           Subjective   Arminda is a 59 year old, presenting for the following:  Physical        12/18/2024     8:28 AM   Additional Questions   Roomed by Tasha OCAMPO      No Concerns     Health Care Directive  Patient does not have a Health Care Directive: Discussed advance care planning with patient; information given to patient to review.      12/14/2024   General Health   How would you rate your overall physical health? Good   Feel stress (tense, anxious, or unable to sleep) To some extent      (!) STRESS CONCERN      12/14/2024   Nutrition   Diet: Gluten-free/reduced            12/14/2024   Exercise   Days per week of moderate/strenous exercise 4 days   Average minutes spent exercising at this level 30 min            12/14/2024   Social Factors   Frequency of gathering with friends or relatives Once a week   Worry food won't last until get money to buy more No   Food not last or not have enough money for food? No   Do you have housing? (Housing is defined as stable permanent housing and does not include staying ouside in a car, in a tent, in an abandoned building, in an overnight shelter, or couch-surfing.) Yes   Are you worried about losing your housing? No   Lack of transportation? Yes   Unable to get utilities (heat,electricity)? No       (!) TRANSPORTATION CONCERN PRESENT      12/14/2024   Fall Risk   Fallen 2 or more times in the past year? No    Trouble with walking or balance? No        Patient-reported          12/14/2024   Activities of Daily Living- Home Safety   Needs help with the following daily activites None of the above   Safety concerns in the home None of the above            12/14/2024   Dental   Dentist two times every year? Yes            12/14/2024   Hearing Screening   Hearing concerns? None of the above            12/14/2024   Driving Risk Screening   Patient/family members have concerns about driving No            12/14/2024   General Alertness/Fatigue Screening    Have you been more tired than usual lately? (!) YES            2024   Urinary Incontinence Screening   Bothered by leaking urine in past 6 months No            2024   TB Screening   Were you born outside of the [place]? No          Today's PHQ-9 Score:       2024     1:50 PM   PHQ-9 SCORE   PHQ-9 Total Score MyChart 7 (Mild depression)   PHQ-9 Total Score 7        Patient-reported         2024   Substance Use   Alcohol more than 3/day or more than 7/wk No   Do you have a current opioid prescription? No   How severe/bad is pain from 1 to 10? 4/10   Do you use any other substances recreationally? (!) BATH SALTS        Social History     Tobacco Use    Smoking status: Former     Current packs/day: 0.00     Types: Cigarettes     Quit date: 1987     Years since quittin.9     Passive exposure: Past    Smokeless tobacco: Never   Vaping Use    Vaping status: Never Used   Substance Use Topics    Alcohol use: Yes     Alcohol/week: 0.0 - 1.0 standard drinks of alcohol     Comment: occasional    Drug use: No           10/11/2024   LAST FHS-7 RESULTS   1st degree relative breast or ovarian cancer Yes    No   Any relative bilateral breast cancer No   Any male have breast cancer No    No   Any ONE woman have BOTH breast AND ovarian cancer No   Any woman with breast cancer before 50yrs Yes    No   2 or more relatives with breast AND/OR ovarian cancer Yes    No   2 or more relatives with breast AND/OR bowel cancer No    No       Multiple values from one day are sorted in reverse-chronological order        Mammogram Screening - Mammogram every 1-2 years updated in Health Maintenance based on mutual decision making      History of abnormal Pap smear: No - age 30-64 HPV with reflex Pap every 5 years recommended        Latest Ref Rng & Units 10/5/2023    10:26 AM 2018    11:29 AM 2018    11:23 AM   PAP / HPV   PAP  Negative for Intraepithelial Lesion or Malignancy (NILM)      PAP (Historical)    NIL     HPV 16 DNA Negative Negative   Negative    HPV 18 DNA Negative Negative   Negative    Other HR HPV Negative Negative   Negative      ASCVD Risk   The 10-year ASCVD risk score (Kendra BULLOCK, et al., 2019) is: 2.6%    Values used to calculate the score:      Age: 59 years      Sex: Female      Is Non- : No      Diabetic: No      Tobacco smoker: No      Systolic Blood Pressure: 136 mmHg      Is BP treated: No      HDL Cholesterol: 69 mg/dL      Total Cholesterol: 178 mg/dL            Reviewed and updated as needed this visit by Provider                    Lab work is in process  Current providers sharing in care for this patient include:  Patient Care Team:  Courtney Pillai APRN CNP as PCP - General (Internal Medicine)  Nde Hunt MD as Referring Physician (Physical Medicine and Rehabilitation)  Young Husain MD as MD (Family Practice)  Selena Falocn MD as Assigned Musculoskeletal Provider  Courtney Pillai APRN CNP as Assigned PCP  Arnel Gannon MD as MD (Cardiovascular & Thoracic Surgery)  Raquel Jasmine PA-C as Physician Assistant (Gastroenterology)  Arnel Gannon MD as Assigned Heart and Vascular Provider  Brielle Sanz MD as Assigned Neuroscience Provider  Raquel Jasmine PA-C as Assigned Gastroenterology Provider  Raquel Joe AuD as Audiologist (Audiology)  Dixon Vann MD as MD (Otolaryngology)  Shadia Keller NP as Nurse Practitioner  Shadia Keller NP as Assigned Surgical Provider  Cosme Bates LMFT as Assigned Behavioral Health Provider    The following health maintenance items are reviewed in Epic and correct as of today:  Health Maintenance   Topic Date Due    MEDICARE ANNUAL WELLNESS VISIT  12/18/2025    ANNUAL REVIEW OF HM ORDERS  12/18/2025    PHQ-9  12/18/2025    COLORECTAL CANCER SCREENING  06/01/2026    PAP FOLLOW-UP  10/05/2026    HPV FOLLOW-UP  10/05/2026    GLUCOSE  10/05/2026    MAMMO  SCREENING  10/11/2026    LIPID  10/05/2028    ADVANCE CARE PLANNING  12/18/2029    RSV VACCINE (1 - 1-dose 75+ series) 11/16/2040    HEPATITIS C SCREENING  Completed    HIV SCREENING  Completed    DEPRESSION ACTION PLAN  Completed    Pneumococcal Vaccine: Pediatrics (0 to 5 Years) and At-Risk Patients (6 to 64 Years)  Aged Out    HPV IMMUNIZATION  Aged Out    MENINGITIS IMMUNIZATION  Aged Out    RSV MONOCLONAL ANTIBODY  Aged Out    PAP  Discontinued    INFLUENZA VACCINE  Discontinued    ZOSTER IMMUNIZATION  Discontinued    DTAP/TDAP/TD IMMUNIZATION  Discontinued    HEPATITIS B IMMUNIZATION  Discontinued    LUNG CANCER SCREENING  Discontinued    COVID-19 Vaccine  Discontinued         Review of Systems  Constitutional, HEENT, cardiovascular, pulmonary, gi and gu systems are negative, except as otherwise noted.     Objective    Exam  /81 (BP Location: Right arm, Patient Position: Sitting, Cuff Size: Adult Regular)   Pulse 70   Temp 97.4  F (36.3  C) (Temporal)   Resp 16   Ht 1.524 m (5')   Wt 52.6 kg (116 lb)   LMP 05/04/2019 (Approximate)   SpO2 99%   BMI 22.65 kg/m     Estimated body mass index is 22.65 kg/m  as calculated from the following:    Height as of this encounter: 1.524 m (5').    Weight as of this encounter: 52.6 kg (116 lb).    Physical Exam  GENERAL: alert and no distress  EYES: Eyes grossly normal to inspection, PERRL and conjunctivae and sclerae normal  HENT: ear canals and TM's normal, nose and mouth without ulcers or lesions  NECK: no adenopathy, no asymmetry, masses, or scars  RESP: lungs clear to auscultation - no rales, rhonchi or wheezes  CV: regular rate and rhythm, normal S1 S2, no S3 or S4, no murmur, click or rub, no peripheral edema  ABDOMEN: soft, nontender, no hepatosplenomegaly, no masses and bowel sounds normal  MS: no gross musculoskeletal defects noted, no edema  SKIN: no suspicious lesions or rashes  NEURO: Normal strength and tone, mentation intact and speech  normal  PSYCH: mentation appears normal, affect normal/bright         12/18/2024   Mini Cog   Clock Draw Score 2 Normal   3 Item Recall 3 objects recalled   Mini Cog Total Score 5                 Signed Electronically by: YVROSE Zepeda CNP    Answers submitted by the patient for this visit:  Patient Health Questionnaire (Submitted on 12/17/2024)  If you checked off any problems, how difficult have these problems made it for you to do your work, take care of things at home, or get along with other people?: Somewhat difficult  PHQ9 TOTAL SCORE: 7

## 2024-12-19 ENCOUNTER — VIRTUAL VISIT (OUTPATIENT)
Dept: PSYCHOLOGY | Facility: CLINIC | Age: 59
End: 2024-12-19
Payer: COMMERCIAL

## 2024-12-19 DIAGNOSIS — F33.0 MDD (MAJOR DEPRESSIVE DISORDER), RECURRENT EPISODE, MILD (H): Primary | ICD-10-CM

## 2024-12-19 PROCEDURE — 90834 PSYTX W PT 45 MINUTES: CPT | Mod: 95 | Performed by: MARRIAGE & FAMILY THERAPIST

## 2024-12-19 NOTE — PROGRESS NOTES
[facility]                                     Progress Note    Patient Name: David Grissom  Date: 24         Service Type: Individual      Session Start Time: 9:34a Session End Time:  10:24a     Session Length: 50    Session #: 5    Attendees: Client attended alone    Service Modality:  Video Visit:      Provider verified identity through the following two step process.  Patient provided:  Patient     Telemedicine Visit: The patient's condition can be safely assessed and treated via synchronous audio and visual telemedicine encounter.      Reason for Telemedicine Visit: Services only offered telehealth    Originating Site (Patient Location): Patient's home    Distant Site (Provider Location): Provider Remote Setting- Home Office    Consent:  The patient/guardian has verbally consented to: the potential risks and benefits of telemedicine (video visit) versus in person care; bill my insurance or make self-payment for services provided; and responsibility for payment of non-covered services.     Patient would like the video invitation sent by:  Send to e-mail at: [email or web address]    Mode of Communication:  Video Conference via Amwell    Distant Location (Provider):  Off-site    As the provider I attest to compliance with applicable laws and regulations related to telemedicine.    DATA  Interactive Complexity: No  Crisis: No        Progress Since Last Session (Related to Symptoms / Goals / Homework):   Symptoms: No change    Homework: Completed in session      Episode of Care Goals: Minimal progress - ACTION (Actively working towards change); Intervened by reinforcing change plan / affirming steps taken     Current / Ongoing Stressors and Concerns:   Last session , overall things have been ok, some challenges with illness in the family. At home,      Treatment Objective(s) Addressed in This Session:   Increase interest, engagement, and pleasure in doing  things       Intervention:   Motivational Interviewing    MI Intervention: Permission to raise concern or advise and Open-ended questions     Change Talk Expressed by the Patient: Ability to change Reasons to change    Provider Response to Change Talk: E - Evoked more info from patient about behavior change and A - Affirmed patient's thoughts, decisions, or attempts at behavior change         ASSESSMENT: Current Emotional / Mental Status (status of significant symptoms):   Risk status (Self / Other harm or suicidal ideation)   Patient denies current fears or concerns for personal safety.   Patient denies current or recent suicidal ideation or behaviors.   Patient denies current or recent homicidal ideation or behaviors.   Patient denies current or recent self injurious behavior or ideation.   Patient denies other safety concerns.   Patient reports there has been no change in risk factors since their last session.     Patient reports there has been no change in protective factors since their last session.     Recommended that patient call 911 or go to the local ED should there be a change in any of these risk factors.     Appearance:   Appropriate    Eye Contact:   Good    Psychomotor Behavior: Normal    Attitude:   Cooperative    Orientation:   All   Speech    Rate / Production: Normal     Volume:  Normal    Mood:    Normal   Affect:    Appropriate    Thought Content:  Clear    Thought Form:  Coherent  Logical    Insight:    Good      Medication Review:   No current psychiatric medications prescribed     Medication Compliance:   NA     Changes in Health Issues:   None reported     Chemical Use Review:   Substance Use: Chemical use reviewed, no active concerns identified      Tobacco Use: No current tobacco use.      Diagnosis:  MDD, mild, recurrent    Collateral Reports Completed:   Not Applicable    PLAN: (Patient Tasks / Therapist Tasks / Other)  Taking back independence    MARISA Ashby     12/19/24                                                         ______________________________________________________________________    Individual Treatment Plan    Patient's Name: David Grissom  YOB: 1965    Date of Creation: 10/16/24  Date Treatment Plan Last Reviewed/Revised: 1/16/25    DSM5 Diagnoses: 296.31 (F33.0) Major Depressive Disorder, Recurrent Episode, Mild _  Psychosocial / Contextual Factors:   PROMIS (reviewed every 90 days):     Referral / Collaboration:  Referral to another professional/service is not indicated at this time..    Anticipated number of session for this episode of care: 3-6 sessions  Anticipation frequency of session: Biweekly  Anticipated Duration of each session: 38-52 minutes  Treatment plan will be reviewed in 90 days or when goals have been changed.       MeasurableTreatment Goal(s) related to diagnosis / functional impairment(s)  Goal 1: Patient will engage in using CBT skills for depression improvement    Objective #A (Patient Action)    Patient will Identify negative self-talk and behaviors: challenge core beliefs, myths, and actions.  Status: New - Date: 10/16/24      Intervention(s)  Therapist will teach emotional recognition/identification.   .        Patient has reviewed and agreed to the above plan.

## 2025-01-10 ENCOUNTER — VIRTUAL VISIT (OUTPATIENT)
Dept: PSYCHOLOGY | Facility: CLINIC | Age: 60
End: 2025-01-10
Payer: COMMERCIAL

## 2025-01-10 DIAGNOSIS — F33.0 MDD (MAJOR DEPRESSIVE DISORDER), RECURRENT EPISODE, MILD: Primary | ICD-10-CM

## 2025-01-10 PROCEDURE — 90834 PSYTX W PT 45 MINUTES: CPT | Mod: 95 | Performed by: MARRIAGE & FAMILY THERAPIST

## 2025-01-10 NOTE — PROGRESS NOTES
[facility]                                     Progress Note    Patient Name: David Grissom  Date: 1/10/25         Service Type: Individual      Session Start Time: 8:02a      Session End Time:  8:50a     Session Length: 48    Session #: 6    Attendees: Client attended alone    Service Modality:  Video Visit:      Provider verified identity through the following two step process.  Patient provided:  Patient     Telemedicine Visit: The patient's condition can be safely assessed and treated via synchronous audio and visual telemedicine encounter.      Reason for Telemedicine Visit: Services only offered telehealth    Originating Site (Patient Location): Patient's home    Distant Site (Provider Location): Provider Remote Setting- Home Office    Consent:  The patient/guardian has verbally consented to: the potential risks and benefits of telemedicine (video visit) versus in person care; bill my insurance or make self-payment for services provided; and responsibility for payment of non-covered services.     Patient would like the video invitation sent by:  Send to e-mail at: [email or web address]    Mode of Communication:  Video Conference via Amwell    Distant Location (Provider):  Off-site    As the provider I attest to compliance with applicable laws and regulations related to telemedicine.    DATA  Interactive Complexity: No  Crisis: No        Progress Since Last Session (Related to Symptoms / Goals / Homework):   Symptoms: N ochange    Homework: Completed in session      Episode of Care Goals: Minimal progress - ACTION (Actively working towards change); Intervened by reinforcing change plan / affirming steps taken     Current / Ongoing Stressors and Concerns:  Last session , overall the past weeks have been stable, enjoyable family time.          Treatment Objective(s) Addressed in This Session:   Increase interest, engagement, and pleasure in doing  things       Intervention:   Motivational Interviewing    MI Intervention: Permission to raise concern or advise and Open-ended questions     Change Talk Expressed by the Patient: Ability to change Reasons to change    Provider Response to Change Talk: E - Evoked more info from patient about behavior change and A - Affirmed patient's thoughts, decisions, or attempts at behavior change         ASSESSMENT: Current Emotional / Mental Status (status of significant symptoms):   Risk status (Self / Other harm or suicidal ideation)   Patient denies current fears or concerns for personal safety.   Patient denies current or recent suicidal ideation or behaviors.   Patient denies current or recent homicidal ideation or behaviors.   Patient denies current or recent self injurious behavior or ideation.   Patient denies other safety concerns.   Patient reports there has been no change in risk factors since their last session.     Patient reports there has been no change in protective factors since their last session.     Recommended that patient call 911 or go to the local ED should there be a change in any of these risk factors.     Appearance:   Appropriate    Eye Contact:   Good    Psychomotor Behavior: Normal    Attitude:   Cooperative    Orientation:   All   Speech    Rate / Production: Normal     Volume:  Normal    Mood:    Normal   Affect:    Appropriate    Thought Content:  Clear    Thought Form:  Coherent  Logical    Insight:    Good      Medication Review:   No current psychiatric medications prescribed     Medication Compliance:   NA     Changes in Health Issues:   None reported     Chemical Use Review:   Substance Use: Chemical use reviewed, no active concerns identified      Tobacco Use: No current tobacco use.      Diagnosis:  MDD, mild, recurrent    Collateral Reports Completed:   Not Applicable    PLAN: (Patient Tasks / Therapist Tasks / Other)  Healthy coping skills    Cosme Bates, MARISA    1/10/25                                                          ______________________________________________________________________    Individual Treatment Plan    Patient's Name: David Grissom  YOB: 1965    Date of Creation: 10/16/24  Date Treatment Plan Last Reviewed/Revised: 1/16/25    DSM5 Diagnoses: 296.31 (F33.0) Major Depressive Disorder, Recurrent Episode, Mild _  Psychosocial / Contextual Factors:   PROMIS (reviewed every 90 days):     Referral / Collaboration:  Referral to another professional/service is not indicated at this time..    Anticipated number of session for this episode of care: 3-6 sessions  Anticipation frequency of session: Biweekly  Anticipated Duration of each session: 38-52 minutes  Treatment plan will be reviewed in 90 days or when goals have been changed.       MeasurableTreatment Goal(s) related to diagnosis / functional impairment(s)  Goal 1: Patient will engage in using CBT skills for depression improvement    Objective #A (Patient Action)    Patient will Identify negative self-talk and behaviors: challenge core beliefs, myths, and actions.  Status: New - Date: 10/16/24      Intervention(s)  Therapist will teach emotional recognition/identification.   .        Patient has reviewed and agreed to the above plan.      MARISA Pena   10/16/24

## 2025-01-27 ENCOUNTER — VIRTUAL VISIT (OUTPATIENT)
Dept: PSYCHOLOGY | Facility: CLINIC | Age: 60
End: 2025-01-27
Payer: COMMERCIAL

## 2025-01-27 DIAGNOSIS — F33.0 MDD (MAJOR DEPRESSIVE DISORDER), RECURRENT EPISODE, MILD: Primary | ICD-10-CM

## 2025-01-27 NOTE — PROGRESS NOTES
[facility]                                     Progress Note    Patient Name: David Grissom  Date: 25         Service Type: Individual      Session Start Time:  10:33a     Session End Time:  11:23a     Session Length: 50    Session #: 7    Attendees: Client attended alone    Service Modality:  Video Visit:      Provider verified identity through the following two step process.  Patient provided:  Patient     Telemedicine Visit: The patient's condition can be safely assessed and treated via synchronous audio and visual telemedicine encounter.      Reason for Telemedicine Visit: Services only offered telehealth    Originating Site (Patient Location): Patient's home    Distant Site (Provider Location): Provider Remote Setting- Home Office    Consent:  The patient/guardian has verbally consented to: the potential risks and benefits of telemedicine (video visit) versus in person care; bill my insurance or make self-payment for services provided; and responsibility for payment of non-covered services.     Patient would like the video invitation sent by:  Send to e-mail at: [email or web address]    Mode of Communication:  Video Conference via [employer]    Distant Location (Provider):  Off-site    As the provider I attest to compliance with applicable laws and regulations related to telemedicine.    DATA  Interactive Complexity: No  Crisis: No        Progress Since Last Session (Related to Symptoms / Goals / Homework):   Symptoms: No change    Homework: Completed in session      Episode of Care Goals: Minimal progress - ACTION (Actively working towards change); Intervened by reinforcing change plan / affirming steps taken     Current / Ongoing Stressors and Concerns:  Last session 1/10, overall things have been stable at home, working to journal more about daily tasks. Working to be more assertive with  about her needs without fear of retaliation. Discussed wanting daughter to  increase independence but getting resistance from her on basic tasks.        Treatment Objective(s) Addressed in This Session:   Increase interest, engagement, and pleasure in doing things       Intervention:   Motivational Interviewing    MI Intervention: Permission to raise concern or advise and Open-ended questions     Change Talk Expressed by the Patient: Ability to change Reasons to change    Provider Response to Change Talk: E - Evoked more info from patient about behavior change and A - Affirmed patient's thoughts, decisions, or attempts at behavior change         ASSESSMENT: Current Emotional / Mental Status (status of significant symptoms):   Risk status (Self / Other harm or suicidal ideation)   Patient denies current fears or concerns for personal safety.   Patient denies current or recent suicidal ideation or behaviors.   Patient denies current or recent homicidal ideation or behaviors.   Patient denies current or recent self injurious behavior or ideation.   Patient denies other safety concerns.   Patient reports there has been no change in risk factors since their last session.     Patient reports there has been no change in protective factors since their last session.     Recommended that patient call 911 or go to the local ED should there be a change in any of these risk factors.     Appearance:   Appropriate    Eye Contact:   Good    Psychomotor Behavior: Normal    Attitude:   Cooperative    Orientation:   All   Speech    Rate / Production: Normal     Volume:  Normal    Mood:    Normal   Affect:    Appropriate    Thought Content:  Clear    Thought Form:  Coherent  Logical    Insight:    Good      Medication Review:   No current psychiatric medications prescribed     Medication Compliance:   NA     Changes in Health Issues:   None reported     Chemical Use Review:   Substance Use: Chemical use reviewed, no active concerns identified      Tobacco Use: No current tobacco use.      Diagnosis:  MDD,  mild, recurrent    Collateral Reports Completed:   Not Applicable    PLAN: (Patient Tasks / Therapist Tasks / Other)  Healthy coping skills    MARISA Ashby    1/27/25                                                         ______________________________________________________________________    Individual Treatment Plan    Patient's Name: David Grissom  YOB: 1965    Date of Creation: 1/27/25  Date Treatment Plan Last Reviewed/Revised: 4/27/25  DSM5 Diagnoses: 296.31 (F33.0) Major Depressive Disorder, Recurrent Episode, Mild _  Psychosocial / Contextual Factors:   PROMIS (reviewed every 90 days):     Referral / Collaboration:  Referral to another professional/service is not indicated at this time..    Anticipated number of session for this episode of care: 3-6 sessions  Anticipation frequency of session: Biweekly  Anticipated Duration of each session: 38-52 minutes  Treatment plan will be reviewed in 90 days or when goals have been changed.       MeasurableTreatment Goal(s) related to diagnosis / functional impairment(s)  Goal 1: Patient will engage in using CBT skills for depression improvement    Objective #A (Patient Action)    Patient will Identify negative self-talk and behaviors: challenge core beliefs, myths, and actions.  Status: Continue: 1/27/25    Intervention(s)  Therapist will teach emotional recognition/identification.   .        Patient has reviewed and agreed to the above plan.      MARISA Pena   1/27/25

## 2025-03-12 ENCOUNTER — TELEPHONE (OUTPATIENT)
Dept: UROLOGY | Facility: CLINIC | Age: 60
End: 2025-03-12
Payer: COMMERCIAL

## 2025-03-12 NOTE — TELEPHONE ENCOUNTER
You have an appointment currently scheduled with Krishna on 5/16.  Due to changes to the providers schedule we need to reschedule your appointment.      Please use your MyChart or call [phone] to reschedule this appointment at your earliest convenience.    We apologize for any inconvenience this may cause.    We look forward to seeing you at your upcoming appointment and thank you for choosing [facility].    Thank you for trusting us with your care.    Sincerely, [facility]

## 2025-03-14 PROBLEM — N18.31 STAGE 3A CHRONIC KIDNEY DISEASE (H): Status: ACTIVE | Noted: 2025-03-14

## 2025-03-17 ENCOUNTER — TELEPHONE (OUTPATIENT)
Dept: OTOLARYNGOLOGY | Facility: CLINIC | Age: 60
End: 2025-03-17
Payer: COMMERCIAL

## 2025-03-17 NOTE — TELEPHONE ENCOUNTER
This encounter is being sent to inform the clinic that this patient has a referral from Courtney Pillai APRN CNP  for the diagnoses of Patient has been having on ongoing right ear issues most notably has a feeling of fullness, noting hearing loss and ringing, noted no impaction on exam would like further assessment  Hearing loss of right ear, unspecified hearing loss type [H91.91]; Tinnitus of right ear [H93.11]  and has requested that this patient be seen within 1-2 WEEKS and/or with ANY PROVIDER.  Based on the availability of our provider(s), we are unable to accommodate this request.    Were all sites offered this patient?  Yes    Does scheduling algorithm request to schedule next available?  Patient has been scheduled for the first available opening with KARLA MARTINEZ on 5/30.  We have informed the patient that the clinic will review their referral and reach out if a sooner appointment is medically necessary.     ONLY WANTS [facility]

## 2025-03-17 NOTE — TELEPHONE ENCOUNTER
Writer attempted to call pt at number listed regarding ENT referral and need for sooner appointment than currently scheduled one on 5/30. No answer from pt- left detailed message with call back number. Sent [employer] message as well.       Maki Hughes RN on 3/17/2025 at 12:57 PM

## 2025-03-18 NOTE — CONFIDENTIAL NOTE
2nd attempt:     Writer attempted to call pt back at number listed regarding scheduling sooner appointment with ENT. No answer- left detailed message with direct callback number.         Maki Hughes RN on 3/18/2025 at 8:53 AM

## 2025-03-19 ENCOUNTER — TELEPHONE (OUTPATIENT)
Dept: DERMATOLOGY | Facility: CLINIC | Age: 60
End: 2025-03-19
Payer: COMMERCIAL

## 2025-03-19 ENCOUNTER — TELEPHONE (OUTPATIENT)
Dept: UROLOGY | Facility: CLINIC | Age: 60
End: 2025-03-19
Payer: COMMERCIAL

## 2025-03-19 NOTE — TELEPHONE ENCOUNTER
[facility]    Phone Message    May a detailed message be left on voicemail: yes     Reason for Call: Other: pt calling wants to schedule ultra sound first then will reschedule, tried to get her to reschedule but no go, please reach out once referral is in     Action Taken: Other: uology    Travel Screening: Not Applicable     Date of Service:

## 2025-03-19 NOTE — TELEPHONE ENCOUNTER
Writer received call back from pt regarding scheduling of ENT appointment. Writer able to reschedule pt from 5/30 to 4/7/2025 with an audiogram at 9:30 am and Julio Holly PA-C at 10:20 am in [place]. Pt verbalized understanding and is agreeable to date/time/location/reason for appointment.         Maki Hughes RN on 3/19/2025 at 8:34 AM

## 2025-03-19 NOTE — TELEPHONE ENCOUNTER
This encounter is being sent to inform the clinic that this patient has a referral from DOMINIQUE PAN for the diagnoses of L81.9 (ICD-10-CM) - Change in pigmented skin lesion and has requested that this patient be seen within 1-2 weeks.  Based on the availability of our provider(s), we are unable to accommodate this request.    Were all sites offered this patient?  Yes  Pt prefers [facility], [facility] & [facility]    Does scheduling algorithm request to schedule next available?  Patient has been scheduled for the first available opening with Dr. oJhn on 05/13.  We have informed the patient that the clinic will review their referral and reach out if a sooner appointment is medically necessary.            Patient had a frackle on right side of cheek has grown in size and has changed in shape and would like to have it furthr assessed

## 2025-03-19 NOTE — TELEPHONE ENCOUNTER
Patient returned our phone call, scheduled for 03/24/2025 at 2:15 pm with Jessica Sinha PA-C in [place].       Maryjane Mcgowan RN on 3/19/2025 at 3:11 PM

## 2025-03-19 NOTE — TELEPHONE ENCOUNTER
You have an appointment currently scheduled with Aixa Keller on 5/16.  Due to changes to the providers schedule we need to reschedule your appointment.      Please use your MyChart or call [phone] to reschedule this appointment at your earliest convenience.    We apologize for any inconvenience this may cause.    We look forward to seeing you at your upcoming appointment and thank you for choosing [facility].    Thank you for trusting us with your care.    Sincerely, [facility]

## 2025-03-19 NOTE — TELEPHONE ENCOUNTER
Called and left VM for patient to give us a call back to discuss getting in sooner.       Offer patient 03/24 at 2:15 pm or 3:15 pm on Jessica Sinha's schedule.       Maryjane Mcgowan RN on 3/19/2025 at 2:23 PM

## 2025-03-24 ENCOUNTER — OFFICE VISIT (OUTPATIENT)
Dept: DERMATOLOGY | Facility: CLINIC | Age: 60
End: 2025-03-24
Payer: COMMERCIAL

## 2025-03-24 DIAGNOSIS — D48.5 NEOPLASM OF UNCERTAIN BEHAVIOR OF SKIN: Primary | ICD-10-CM

## 2025-03-24 PROCEDURE — 1125F AMNT PAIN NOTED PAIN PRSNT: CPT | Performed by: STUDENT IN AN ORGANIZED HEALTH CARE EDUCATION/TRAINING PROGRAM

## 2025-03-24 PROCEDURE — 11102 TANGNTL BX SKIN SINGLE LES: CPT | Performed by: STUDENT IN AN ORGANIZED HEALTH CARE EDUCATION/TRAINING PROGRAM

## 2025-03-24 ASSESSMENT — PAIN SCALES - GENERAL: PAINLEVEL_OUTOF10: MILD PAIN (3)

## 2025-03-24 NOTE — NURSING NOTE
The following medication was given:     MEDICATION:  Lidocaine with epinephrine 1% 1:262930  ROUTE: SQ  SITE: see procedure note  DOSE: 0.5 mL  LOT #: [id number]  : [employer]  EXPIRATION DATE: 11-  NDC#: 3299-0645-51  Was there drug waste? Yes, 0.5 mL  Multi-dose vial: Yes    Maryjane Mcgowan RN  March 24, 2025

## 2025-03-24 NOTE — PATIENT INSTRUCTIONS
Wound Care After a Biopsy    What is a skin biopsy?  A skin biopsy allows the doctor to examine a very small piece of tissue under the microscope to determine the diagnosis and the best treatment for the skin condition. A local anesthetic (numbing medicine)  is injected with a very small needle into the skin area to be tested. A small piece of skin is taken from the area. Sometimes a suture (stitch) is used.     What are the risks of a skin biopsy?  I will experience scar, bleeding, swelling, pain, crusting and redness. I may experience incomplete removal or recurrence. Risks of this procedure are excessive bleeding, bruising, infection, nerve damage, numbness, thick (hypertrophic or keloidal) scar and non-diagnostic biopsy.    How should I care for my wound for the first 24 hours?  Keep the wound dry and covered for 24 hours  If it bleeds, hold direct pressure on the area for 15 minutes. If bleeding does not stop then go to the emergency room  Avoid strenuous exercise the first 1-2 days or as your doctor instructs you    How should I care for the wound after 24 hours?  After 24 hours, remove the bandage  You may bathe or shower as normal  If you had a scalp biopsy, you can shampoo as usual and can use shower water to clean the biopsy site daily  Clean the wound twice a day with gentle soap and water  Do not scrub, be gentle  Apply white petroleum/Vaseline after cleaning the wound with a cotton swab or a clean finger, and keep the site covered with a Bandaid /bandage. Bandages are not necessary with a scalp biopsy  If you are unable to cover the site with a Bandaid /bandage, re-apply ointment 2-3 times a day to keep the site moist. Moisture will help with healing  Avoid strenuous activity for first 1-2 days  Avoid lakes, rivers, pools, and oceans until the stitches are removed or the site is healed    How do I clean my wound?  Wash hands thoroughly with soap or use hand  before all wound care  Clean the  wound with gentle soap and water  Apply white petroleum/Vaseline  to wound after it is clean  Replace the Bandaid /bandage to keep the wound covered for the first few days or as instructed by your doctor  If you had a scalp biopsy, warm shower water to the area on a daily basis should suffice    What should I use to clean my wound?   Cotton-tipped applicators (Qtips )  White petroleum jelly (Vaseline ). Use a clean new container and use Q-tips to apply.  Bandaids   as needed  Gentle soap     How should I care for my wound long term?  Do not get your wound dirty  Keep up with wound care for one week or until the area is healed.  A small scab will form and fall off by itself when the area is completely healed. The area will be red and will become pink in color as it heals. Sun protection is very important for how your scar will turn out. Sunscreen with an SPF 30 or greater is recommended once the area is healed.  If you have stitches, stitches need to be removed in 7 days on the face/neck, or 10-14 days on the body days. You may return to our clinic for this or you may have it done locally at your doctor s office.  You should have some soreness but it should be mild and slowly go away over several days. Talk to your doctor about using tylenol for pain,    When should I call my doctor?  If you have increased:   Pain or swelling  Pus or drainage (clear or slightly yellow drainage is ok)  Temperature over 100F  Spreading redness or warmth around wound    When will I hear about my results?  The biopsy results can take 2 weeks to come back.  Your results will automatically release to [employer] before your provider has even reviewed them.  The clinic will call you with the results, send you a [employer] message, or have you schedule a follow-up clinic or phone time to discuss the results.  Contact our clinics if you do not hear from us in 2 weeks. If further treatment is needed for a skin cancer, this will be done at either our  [place] or [place] office.    Who should I call with questions?  [facility]: [phone]  [facility]: [phone]  For urgent needs outside of business hours call the [facility] at [phone] and ask for the dermatology resident on call

## 2025-03-24 NOTE — NURSING NOTE
David Grissom's chief complaint for this visit includes:  Chief Complaint   Patient presents with    Skin Check     Patient here for spot check on R cheek. This spot has been present for a few months, but it has grown in size and changed shapes.. She states it started as a freckle. This spot has been crusting as well, but no bleeding.     PCP: Courtney Pillai    Referring Provider:  Courtney Pillai, YVROSE CNP  [address] [facility] [place] [address]    [facility] 05/04/2019 (Approximate)   Mild Pain (3)        Allergies   Allergen Reactions    Ciprofloxacin Other (See Comments) and Unknown     Seizures    Seizure reaction not confirmed but suspected    Cephalosporins Other (See Comments)    Latex Other (See Comments) and Swelling     If Latex touches her face, her face swells up.         Do you need any medication refills at today's visit?    Addie Lowe on 3/24/2025 at 2:18 PM

## 2025-03-24 NOTE — PROGRESS NOTES
[facility] Dermatology Note  Encounter Date: Mar 24, 2025  Office Visit     Reviewed patients past medical history and pertinent chart review prior to patients visit today.     Dermatology Problem List:  0. NUB right upper cheek, shave biopsy 03/24/25 .    ____________________________________________    Assessment & Plan:     # Neoplasm of uncertain behavior:  right upper cheek  DDx includes ISK vs NMSC. Shave biopsy today.    Procedure Note: Biopsy by shave technique  The risks and benefits of the procedure were described to the patient. These include but are not limited to bleeding, infection, scar, incomplete removal, and non-diagnostic biopsy. Verbal informed consent was obtained. The above site(s) was cleansed with an alcohol pad and injected with 1% lidocaine with epinephrine. Once anesthesia was obtained, a biopsy(ies) was performed with Gilette blade. The tissue(s) was placed in a labeled container(s) with formalin and sent to pathology. Hemostasis was achieved with aluminum chloride. Vaseline and a bandage were applied to the wound(s). The patient tolerated the procedure well and was given post biopsy care instructions.    Follow up: PRN pending biopsy results     Jessica Sinha PA-C  [facility]  Dermatology    _______________________________________    CC: Skin Check (Patient here for spot check on R cheek. This spot has been present for a few months, but it has grown in size and changed shapes.. She states it started as a freckle. This spot has been crusting as well, but no bleeding.)    HPI:  Ms. David Grissom is a(n) 59 year old female who presents today as a new patient for evaluation of lesion of concern on the right upper cheek. This lesion has been present for several months and has become more irritated. She is wondering if it is of concern.     Patient is otherwise feeling well, without additional skin concerns.      Physical Exam:  SKIN: Focused examination of  right cheek was performed.  - NUB, right upper cheek, flesh colored to erythematous, excoriated papule     - No other lesions of concern on areas examined.     Medications:  Current Outpatient Medications   Medication Sig Dispense Refill    Multiple Vitamin (DAILY MULTIVITAMIN PO) Take  by mouth.      ORDER FOR DME Equipment being ordered: right prosthetic leg 1 Device 0    ORDER FOR DME Equipment being ordered: crutches 1 Device 0     No current facility-administered medications for this visit.      Past Medical History:   Patient Active Problem List   Diagnosis    CARDIOVASCULAR SCREENING; LDL GOAL LESS THAN 130    Spina bifida (H)    Seizure disorder (H)    History of recurrent UTIs    Thyroid nodule    Nontoxic multinodular goiter    History of leg amputation (H)    Attention to urostomy (H)    ASCUS with positive high risk HPV cervical    Chronic bilateral low back pain without sciatica    Depression, unspecified depression type    MDD (major depressive disorder), recurrent episode, mild    Stage 3a chronic kidney disease (H)     Past Medical History:   Diagnosis Date    ASCUS with positive high risk HPV cervical 06/15/2017    type 16 & other HR HPV    Spina bifida (H)        CC Courtney Pillai, APRN CNP  [address] [facility] [place] [id number] on close of this encounter.

## 2025-03-26 LAB
PATH REPORT.COMMENTS IMP SPEC: NORMAL
PATH REPORT.FINAL DX SPEC: NORMAL
PATH REPORT.GROSS SPEC: NORMAL
PATH REPORT.MICROSCOPIC SPEC OTHER STN: NORMAL
PATH REPORT.RELEVANT HX SPEC: NORMAL

## 2025-03-27 ENCOUNTER — TELEPHONE (OUTPATIENT)
Dept: DERMATOLOGY | Facility: CLINIC | Age: 60
End: 2025-03-27
Payer: COMMERCIAL

## 2025-03-27 NOTE — TELEPHONE ENCOUNTER
Pt called and given results. She wanted to cryotherapy for the lesion. Scheduled her for 5/27 with Kelsie Begum.    Linda Ott RN on 3/27/2025 at 9:32 AM      Final Diagnosis  A(1). Skin, right upper cheek, shave:  - Epidermal hyperplasia with spongiosis and focal keratinocyte atypia - (see comment)     Electronically signed by Alex Chen MD on 3/26/2025 at  3:58 PM      Result Notes     Jessica Sinha PA-C  3/27/2025  7:42 AM CDT Back to Top    Please let patient know that biopsy(s) showed the following with the below treatment recommendations:     A. Right upper cheek, hypertrophic actinic keratosis in conjunction with eczematous dermatitis, as the actinic keratosis was hypertrophic it does need further treatment at the base. Hypertrophic means the actinic keratosis is more thick. We can treat this in 1 of 2 ways.  We can treat with an office visit and treat with cryotherapy versus topical Efudex therapy twice daily for 21 days.  Please let me know what the patient prefers.

## 2025-04-21 ENCOUNTER — TELEPHONE (OUTPATIENT)
Dept: INTERNAL MEDICINE | Facility: CLINIC | Age: 60
End: 2025-04-21

## 2025-04-21 NOTE — TELEPHONE ENCOUNTER
Reason for Call:  Appointment Request    Patient requesting this type of appt:  other    Requested provider: Courtney Pillai    Reason patient unable to be scheduled: Needs to be scheduled by clinic    When does patient want to be seen/preferred time:  asap    Comments: Pt appt was cancel today; juan with different provider on 4/28; pt would like to be seen sooner if possible    Could we send this information to you in [employer] or would you prefer to receive a phone call?:   Patient would like to be contacted via [employer]    Call taken on 4/21/2025 at 11:22 AM by Brittnee Huang

## 2025-04-23 NOTE — TELEPHONE ENCOUNTER
Appointment made with Courtney Pillai for 4/24/25 at 2:00. [employer] message sent to patient per her request.

## 2025-05-27 ENCOUNTER — OFFICE VISIT (OUTPATIENT)
Dept: DERMATOLOGY | Facility: CLINIC | Age: 60
End: 2025-05-27
Payer: COMMERCIAL

## 2025-05-27 DIAGNOSIS — L57.0 HYPERTROPHIC ACTINIC KERATOSIS: Primary | ICD-10-CM

## 2025-05-27 NOTE — LETTER
5/27/2025      David Grissom  [address]  [place] [address]      Dear Colleague,    Thank you for referring your patient, David Grissom, to the [facility]. Please see a copy of my visit note below.    [facility] Dermatology Note  Encounter Date: May 27, 2025  Office Visit     Reviewed patients past medical history and pertinent chart review prior to patients visit today.     Dermatology Problem List:    HAK   - R cheek, S/p Bx 3/24/25, S/p cryo 5/27/25  ____________________________________________    Assessment & Plan:     #HAK x  1 R cheek  - Cryotherapy performed today, see procedure note below  Procedures Performed:   CRYOTHERAPY PROCEDURE NOTE: 1 lesions in the above locations were treated with liquid nitrogen utilizing a 5-10sec thaw time. Patient was advised that the treated areas will become red, swollen, may develop a blister and then should crust and peal off in the next 1-2 weeks. Post-procedure instructions were provided.    Follow up: PRN     Scribe Disclosure:   I, BRENNEN CALLAHAN, am serving as a scribe; to document services personally performed by Kelsie Begum PA-C -based on data collection and the provider's statements to me.     Provider Disclosure:  I agree with above History, Review of Systems, Physical exam and Plan.  I have reviewed the content of the documentation and have edited it as needed. I have personally performed the services documented here and the documentation accurately represents those services and the decisions I have made.      Electronically signed by:    Provider Disclosure:   The documentation recorded by the scribe accurately reflects the services I personally performed and the decisions made by me.    All risks, benefits and alternatives were discussed with patient.  Patient is in agreement and understands the assessment and plan.  All questions were answered.    Kelsie Begum PA-C, MPAS  [facility]  [facility]: Phone: [phone], Fax: [phone]  [facility]: Phone: [phone],  Fax: [phone]  [facility] [place]: Phone: [phone], Fax: [phone]    _______________________________________    CC: No chief complaint on file.    HPI:  Ms. David Grissom is a(n) 59 year old female who presents today as a return patient for cryotherapy on her R cheek due to being a hypertrophic actinic keratosis in conjunction with eczematous dermatitis.     Patient is otherwise feeling well, without additional skin concerns.      Physical Exam:  SKIN: Focused examination of areas noted below was performed.  - R cheek bx proven HAK - healed well, but still with some hyperkeratotic areas  - No other lesions of concern on areas examined.     Medications:  Current Outpatient Medications   Medication Sig Dispense Refill     Multiple Vitamin (DAILY MULTIVITAMIN PO) Take  by mouth.       ORDER FOR DME Equipment being ordered: right prosthetic leg 1 Device 0     ORDER FOR DME Equipment being ordered: crutches 1 Device 0     No current facility-administered medications for this visit.      Past Medical History:   Patient Active Problem List   Diagnosis     CARDIOVASCULAR SCREENING; LDL GOAL LESS THAN 130     Spina bifida (H)     Seizure disorder (H)     History of recurrent UTIs     Thyroid nodule     Nontoxic multinodular goiter     History of leg amputation (H)     Attention to urostomy (H)     ASCUS with positive high risk HPV cervical     Chronic bilateral low back pain without sciatica     Depression, unspecified depression type     MDD (major depressive disorder), recurrent episode, mild     Stage 3a chronic kidney disease (H)     Past Medical History:   Diagnosis Date     ASCUS with positive high risk HPV cervical 06/15/2017    type 16 & other HR HPV     Spina bifida (H)        Again, thank you for allowing me to participate in  the care of your patient.        Sincerely,        Kelsie Begum PA-C    Electronically signed

## 2025-05-27 NOTE — NURSING NOTE
David Grissom's goals for this visit include:   Chief Complaint   Patient presents with    Derm Problem     Patient is here for cryo on the R upper cheek       She requests these members of her care team be copied on today's visit information:     PCP: Courtney Pillai    Referring Provider:  Jessica Sinha PA-C  [address]  [place] [address]    LMP 05/04/2019 (Approximate)     Do you need any medication refills at today's visit?       Raquel Pagan EMT      
normal/well-groomed/no distress

## 2025-05-27 NOTE — PATIENT INSTRUCTIONS
Cryotherapy    What is it?  Use of a very cold liquid, such as liquid nitrogen, to freeze and destroy abnormal skin cells that need to be removed    What should I expect?  Tenderness and redness  A small blister that might grow and fill with dark purple blood. There may be crusting.  More than one treatment may be needed if the lesions do not go away.    How do I care for the treated area?  Gently wash the area with your hands when bathing.  Use a thin layer of Vaseline to help with healing. You may use a Band-Aid.   The area should heal within 7-10 days and may leave behind a pink or lighter color.   Do not use an antibiotic or Neosporin ointment.   You may take acetaminophen (Tylenol) for pain.     Call your Doctor if you have:  Severe pain  Signs of infection (warmth, redness, cloudy yellow drainage, and or a bad smell)  Questions or concerns    Who should I call with questions?      [facility]: [phone]      [facility]: [phone]      For urgent needs outside of business hours call the [facility] at [phone]        and ask for the dermatology resident on call

## 2025-05-27 NOTE — PROGRESS NOTES
[facility] Dermatology Note  Encounter Date: May 27, 2025  Office Visit     Reviewed patients past medical history and pertinent chart review prior to patients visit today.     Dermatology Problem List:    HAK   - R cheek, S/p Bx 3/24/25, S/p cryo 5/27/25  ____________________________________________    Assessment & Plan:     #HAK x  1 R cheek  - Cryotherapy performed today, see procedure note below  Procedures Performed:   CRYOTHERAPY PROCEDURE NOTE: 1 lesions in the above locations were treated with liquid nitrogen utilizing a 5-10sec thaw time. Patient was advised that the treated areas will become red, swollen, may develop a blister and then should crust and peal off in the next 1-2 weeks. Post-procedure instructions were provided.    Follow up: PRN     Scribe Disclosure:   I, BRENNEN CALLAHAN, am serving as a scribe; to document services personally performed by Kelsie Begum PA-C -based on data collection and the provider's statements to me.     Provider Disclosure:  I agree with above History, Review of Systems, Physical exam and Plan.  I have reviewed the content of the documentation and have edited it as needed. I have personally performed the services documented here and the documentation accurately represents those services and the decisions I have made.      Electronically signed by:    Provider Disclosure:   The documentation recorded by the scribe accurately reflects the services I personally performed and the decisions made by me.    All risks, benefits and alternatives were discussed with patient.  Patient is in agreement and understands the assessment and plan.  All questions were answered.    Kelsie Begum PA-C, MPAS  [facility] [place]: Phone: [phone], Fax: [phone]  [facility]: Phone: [phone],  Fax: [phone]  [facility]: Phone: [phone], Fax:  [phone]    _______________________________________    CC: No chief complaint on file.    HPI:  Ms. David Grissom is a(n) 59 year old female who presents today as a return patient for cryotherapy on her R cheek due to being a hypertrophic actinic keratosis in conjunction with eczematous dermatitis.     Patient is otherwise feeling well, without additional skin concerns.      Physical Exam:  SKIN: Focused examination of areas noted below was performed.  - R cheek bx proven HAK - healed well, but still with some hyperkeratotic areas  - No other lesions of concern on areas examined.     Medications:  Current Outpatient Medications   Medication Sig Dispense Refill    Multiple Vitamin (DAILY MULTIVITAMIN PO) Take  by mouth.      ORDER FOR DME Equipment being ordered: right prosthetic leg 1 Device 0    ORDER FOR DME Equipment being ordered: crutches 1 Device 0     No current facility-administered medications for this visit.      Past Medical History:   Patient Active Problem List   Diagnosis    CARDIOVASCULAR SCREENING; LDL GOAL LESS THAN 130    Spina bifida (H)    Seizure disorder (H)    History of recurrent UTIs    Thyroid nodule    Nontoxic multinodular goiter    History of leg amputation (H)    Attention to urostomy (H)    ASCUS with positive high risk HPV cervical    Chronic bilateral low back pain without sciatica    Depression, unspecified depression type    MDD (major depressive disorder), recurrent episode, mild    Stage 3a chronic kidney disease (H)     Past Medical History:   Diagnosis Date    ASCUS with positive high risk HPV cervical 06/15/2017    type 16 & other HR HPV    Spina bifida (H)

## 2025-05-28 ENCOUNTER — PRE VISIT (OUTPATIENT)
Dept: UROLOGY | Facility: CLINIC | Age: 60
End: 2025-05-28
Payer: COMMERCIAL

## 2025-05-28 ENCOUNTER — TELEPHONE (OUTPATIENT)
Dept: INTERNAL MEDICINE | Facility: CLINIC | Age: 60
End: 2025-05-28
Payer: COMMERCIAL

## 2025-05-28 NOTE — PROGRESS NOTES
HPI:  David Grissom is a 59 year old female with history of NGB 2/2 SB s/p cystectomy and ileal conduit at age 5 being seen for annual follow-up.      Medsur History  2021-seen by Dr. Koroma, bilateral hydro on R US, no evidence of ureteral stricture on loopogram.  5/16/2024-visit with me, no concerns, no UTIs in the past year.  Stable chronic mild left hydronephrosis.    Today  Conduit bags changed every 5-7 days  no leakage  drains well  No gross hematuria   No UTI        accompanied by her daughter    Exam:  LMP 05/04/2019 (Approximate)   General: age-appropriate appearing female in NAD sitting in an exam chair  HEENT: Head AT/NC, EOMI, CN Grossly intact.  Resp: no respiratory distress  Abdomen: Degree of obesity is none. Abdomen is soft and nontender. No organomegaly. Ileal conduit healthy looking. Drains well.  : urine clear    Review of Imaging:  The following imaging exams were independently viewed and interpreted by me and discussed with patient:  CTAP 4/21/24  KIDNEYS/BLADDER: Moderate cortical scarring of both kidneys. Bilateral renal cysts have benign features and require no follow-up. Mild left-sided hydronephrosis and dilatation of both ureters to the junction with the urostomy. No appreciable mass or stones visualized. Cystectomy.     Review of Labs:  The following labs were reviewed by me and discussed with the patient:  No results found for this or any previous visit (from the past 720 hours).  Creatinine 0.97 on 12/18/2024    Assessment & Plan   Neurogenic bladder 2/2 SB s/p ileal conduit at ageg 5    - doing well  - CHERRIE next available  - follow up with me in 1 year with another CHERRIE darnell Keller NP  [facility] [place]    ==========================      Additional Coding Information:    Time spent:  I spent a total of 20 minutes on the day of the visit.   Time spent by me today doing chart review, history and exam, documentation and further  activities per the note

## 2025-05-28 NOTE — TELEPHONE ENCOUNTER
Attempt #1 to call patient.     Called patient regarding PHQ9 scores submitted via [employer]:        RN left voicemail and requested return call to [facility] at #[phone]. When patient returns call, please triage.    Consent form on file lists 's name but none of the boxes to share information are checked. Secondary phone listed for patient went to a voicemail with a name on the message that was very different from patient/spouse's names so did not leave a message there.     Shawnee MADDEN RN  [facility]

## 2025-05-28 NOTE — TELEPHONE ENCOUNTER
Previsit Planning        Reason for visit: Follow-up     Relevant Information: NGB    Records/imaging/labs/orders: CT Abdominal from 4/21/24 available     Rooming: Standard

## 2025-05-29 ENCOUNTER — OFFICE VISIT (OUTPATIENT)
Dept: UROLOGY | Facility: CLINIC | Age: 60
End: 2025-05-29
Payer: COMMERCIAL

## 2025-05-29 VITALS
WEIGHT: 108 LBS | HEIGHT: 60 IN | DIASTOLIC BLOOD PRESSURE: 80 MMHG | SYSTOLIC BLOOD PRESSURE: 146 MMHG | BODY MASS INDEX: 21.2 KG/M2 | OXYGEN SATURATION: 98 %

## 2025-05-29 DIAGNOSIS — Z93.6 STATUS POST ILEAL CONDUIT (H): ICD-10-CM

## 2025-05-29 DIAGNOSIS — N31.9 NEUROGENIC BLADDER: Primary | ICD-10-CM

## 2025-05-29 ASSESSMENT — PAIN SCALES - GENERAL: PAINLEVEL_OUTOF10: NO PAIN (0)

## 2025-05-29 NOTE — NURSING NOTE
David Grissom is a 59 year old female patient that presents today in clinic for the following:    Chief Complaint   Patient presents with    Follow Up     NGB       The patient's allergies and medications were reviewed as noted. A set of vitals were recorded as noted without incident. The patient does not have any other questions for the provider.    Blood pressure (!) 146/80, height 1.524 m (5'), weight 49 kg (108 lb), last menstrual period 2019, SpO2 98%. Body mass index is 21.09 kg/m .    Patient Active Problem List   Diagnosis    CARDIOVASCULAR SCREENING; LDL GOAL LESS THAN 130    Spina bifida (H)    Seizure disorder (H)    History of recurrent UTIs    Thyroid nodule    Nontoxic multinodular goiter    History of leg amputation (H)    Attention to urostomy (H)    ASCUS with positive high risk HPV cervical    Chronic bilateral low back pain without sciatica    Depression, unspecified depression type    MDD (major depressive disorder), recurrent episode, mild    Stage 3a chronic kidney disease (H)       Allergies   Allergen Reactions    Ciprofloxacin Other (See Comments) and Unknown     Seizures    Seizure reaction not confirmed but suspected    Cephalosporins Other (See Comments)    Latex Other (See Comments) and Swelling     If Latex touches her face, her face swells up.       Current Outpatient Medications   Medication Sig Dispense Refill    Multiple Vitamin (DAILY MULTIVITAMIN PO) Take  by mouth.      ORDER FOR DME Equipment being ordered: right prosthetic leg 1 Device 0    ORDER FOR DME Equipment being ordered: crutches 1 Device 0       Social History     Tobacco Use    Smoking status: Former     Current packs/day: 0.00     Types: Cigarettes     Quit date: 1987     Years since quittin.4     Passive exposure: Past    Smokeless tobacco: Never   Vaping Use    Vaping status: Never Used   Substance Use Topics    Alcohol use: Yes     Alcohol/week: 0.0 - 1.0 standard drinks of alcohol     Comment:  occasional    Drug use: Patricia Perera, EMT-P   5/29/2025  1:22 PM

## 2025-05-29 NOTE — LETTER
5/29/2025       RE: David Grissom  [address]  [place] [address]     Dear Colleague,    Thank you for referring your patient, David Grissom, to the [facility] UROLOGY [facility]IC [place] at [facility]. Please see a copy of my visit note below.    HPI:  David Grissom is a 59 year old female with history of NGB 2/2 SB s/p cystectomy and ileal conduit at age 5 being seen for annual follow-up.      Medsurg History  2021-seen by Dr. Koroma, bilateral hydro on R US, no evidence of ureteral stricture on loopogram.  5/16/2024-visit with me, no concerns, no UTIs in the past year.  Stable chronic mild left hydronephrosis.    Today  Conduit bags changed every 5-7 days  no leakage  drains well  No gross hematuria   No UTI        accompanied by her daughter    Exam:  LMP 05/04/2019 (Approximate)   General: age-appropriate appearing female in NAD sitting in an exam chair  HEENT: Head AT/NC, EOMI, CN Grossly intact.  Resp: no respiratory distress  Abdomen: Degree of obesity is none. Abdomen is soft and nontender. No organomegaly. Ileal conduit healthy looking. Drains well.  : urine clear    Review of Imaging:  The following imaging exams were independently viewed and interpreted by me and discussed with patient:  CTAP 4/21/24  KIDNEYS/BLADDER: Moderate cortical scarring of both kidneys. Bilateral renal cysts have benign features and require no follow-up. Mild left-sided hydronephrosis and dilatation of both ureters to the junction with the urostomy. No appreciable mass or stones visualized. Cystectomy.     Review of Labs:  The following labs were reviewed by me and discussed with the patient:  No results found for this or any previous visit (from the past 720 hours).  Creatinine 0.97 on 12/18/2024    Assessment & Plan  Neurogenic bladder 2/2 SB s/p ileal conduit at ageg 5    - doing well  - CHERRIE next available  - follow up with me in 1 year with another  CHERRIE Keller NP  [facility] [place]    ==========================      Additional Coding Information:    Time spent:  I spent a total of 20 minutes on the day of the visit.   Time spent by me today doing chart review, history and exam, documentation and further activities per the note              Again, thank you for allowing me to participate in the care of your patient.      Sincerely,    Shadia Keller NP

## 2025-06-03 NOTE — TELEPHONE ENCOUNTER
Attempt #2 to call patient.     RN left voicemail and requested return call to [facility] at #[phone]. Also sent pt a [employer] message requesting call to our clinic, as well as mental health resources. When patient returns call, please triage depression symptoms based on PHQ9 scores below.    Shawnee MADDEN RN  [facility]

## 2025-06-04 ENCOUNTER — TELEPHONE (OUTPATIENT)
Dept: DERMATOLOGY | Facility: CLINIC | Age: 60
End: 2025-06-04
Payer: COMMERCIAL

## 2025-06-04 NOTE — TELEPHONE ENCOUNTER
Left Voicemail (1st Attempt) for the patient to call back and schedule the following:    Appointment type: Return  Provider: TONI  Return date: 05/27/2026  Specialty phone number: [phone]    [facility]   Dermatology, Urology, General Surgery Specialties   [facility]

## 2025-06-06 ENCOUNTER — TELEPHONE (OUTPATIENT)
Dept: UROLOGY | Facility: CLINIC | Age: 60
End: 2025-06-06
Payer: COMMERCIAL

## 2025-06-06 NOTE — TELEPHONE ENCOUNTER
Left Voicemail (1st Attempt) for the patient to call back and schedule the following:    Appointment type: Return  Provider: Aixa Keller  Return date: Schedule CHERRIE next available, Return in about 1 year (around 5/29/2026) with CHERRIE prior.  Specialty phone number: [phone]  Additional appointment(s) needed:   Additonal Notes:

## 2025-06-09 ENCOUNTER — MEDICAL CORRESPONDENCE (OUTPATIENT)
Dept: HEALTH INFORMATION MANAGEMENT | Facility: CLINIC | Age: 60
End: 2025-06-09
Payer: COMMERCIAL

## 2025-06-11 NOTE — TELEPHONE ENCOUNTER
Attempt #3    Left vm asking return call about depression screen or check mychart. MyChart message sent to patient. Depression/suicide prevention outreach added to mychart message.     Thanks,  YESENIA Torres  [facility]